# Patient Record
Sex: FEMALE | Race: WHITE | NOT HISPANIC OR LATINO | Employment: UNEMPLOYED | URBAN - NONMETROPOLITAN AREA
[De-identification: names, ages, dates, MRNs, and addresses within clinical notes are randomized per-mention and may not be internally consistent; named-entity substitution may affect disease eponyms.]

---

## 2017-01-21 ENCOUNTER — HOSPITAL ENCOUNTER (EMERGENCY)
Facility: HOSPITAL | Age: 62
Discharge: HOME | End: 2017-01-21
Attending: PHYSICIAN ASSISTANT | Admitting: PHYSICIAN ASSISTANT
Payer: MEDICARE

## 2017-01-21 VITALS
SYSTOLIC BLOOD PRESSURE: 178 MMHG | DIASTOLIC BLOOD PRESSURE: 86 MMHG | HEART RATE: 73 BPM | OXYGEN SATURATION: 98 % | TEMPERATURE: 98 F | RESPIRATION RATE: 16 BRPM

## 2017-01-21 DIAGNOSIS — S09.90XA CLOSED HEAD INJURY, INITIAL ENCOUNTER: ICD-10-CM

## 2017-01-21 DIAGNOSIS — S42.221A CLOSED 2-PART DISPLACED FRACTURE OF SURGICAL NECK OF RIGHT HUMERUS, INITIAL ENCOUNTER: Primary | ICD-10-CM
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
## 2017-01-21 LAB
ALBUMIN SERPL-MCNC: 3.2 G/DL (ref 3.4–5)
ALP SERPL-CCNC: 94 U/L (ref 40–150)
ALT SERPL W P-5'-P-CCNC: 18 U/L (ref 0–50)
ANION GAP SERPL CALCULATED.3IONS-SCNC: 10 MMOL/L (ref 3–14)
AST SERPL W P-5'-P-CCNC: 35 U/L (ref 0–45)
BASOPHILS # BLD AUTO: 0.1 10E9/L (ref 0–0.2)
BASOPHILS NFR BLD AUTO: 0.6 %
BILIRUB SERPL-MCNC: 0.3 MG/DL (ref 0.2–1.3)
BUN SERPL-MCNC: 15 MG/DL (ref 7–30)
CALCIUM SERPL-MCNC: 8.9 MG/DL (ref 8.5–10.1)
CHLORIDE SERPL-SCNC: 106 MMOL/L (ref 94–109)
CO2 SERPL-SCNC: 27 MMOL/L (ref 20–32)
CREAT SERPL-MCNC: 0.69 MG/DL (ref 0.52–1.04)
DIFFERENTIAL METHOD BLD: ABNORMAL
EOSINOPHIL # BLD AUTO: 0.3 10E9/L (ref 0–0.7)
EOSINOPHIL NFR BLD AUTO: 4.1 %
ERYTHROCYTE [DISTWIDTH] IN BLOOD BY AUTOMATED COUNT: 13 % (ref 10–15)
GFR SERPL CREATININE-BSD FRML MDRD: 85 ML/MIN/1.7M2
GLUCOSE SERPL-MCNC: 120 MG/DL (ref 70–99)
HCT VFR BLD AUTO: 37 % (ref 35–47)
HGB BLD-MCNC: 11.4 G/DL (ref 11.7–15.7)
IMM GRANULOCYTES # BLD: 0 10E9/L (ref 0–0.4)
IMM GRANULOCYTES NFR BLD: 0.4 %
LYMPHOCYTES # BLD AUTO: 2.5 10E9/L (ref 0.8–5.3)
LYMPHOCYTES NFR BLD AUTO: 29.2 %
MCH RBC QN AUTO: 27.7 PG (ref 26.5–33)
MCHC RBC AUTO-ENTMCNC: 30.8 G/DL (ref 31.5–36.5)
MCV RBC AUTO: 90 FL (ref 78–100)
MONOCYTES # BLD AUTO: 0.7 10E9/L (ref 0–1.3)
MONOCYTES NFR BLD AUTO: 8.6 %
NEUTROPHILS # BLD AUTO: 4.8 10E9/L (ref 1.6–8.3)
NEUTROPHILS NFR BLD AUTO: 57.1 %
NRBC # BLD AUTO: 0 10*3/UL
NRBC BLD AUTO-RTO: 0 /100
PLATELET # BLD AUTO: 179 10E9/L (ref 150–450)
POTASSIUM SERPL-SCNC: 4.3 MMOL/L (ref 3.4–5.3)
PROT SERPL-MCNC: 6.6 G/DL (ref 6.8–8.8)
RBC # BLD AUTO: 4.12 10E12/L (ref 3.8–5.2)
SODIUM SERPL-SCNC: 143 MMOL/L (ref 133–144)
WBC # BLD AUTO: 8.4 10E9/L (ref 4–11)

## 2017-01-21 PROCEDURE — 99284 EMERGENCY DEPT VISIT MOD MDM: CPT | Mod: 25

## 2017-01-21 PROCEDURE — 99283 EMERGENCY DEPT VISIT LOW MDM: CPT | Performed by: PHYSICIAN ASSISTANT

## 2017-01-21 PROCEDURE — 73070 X-RAY EXAM OF ELBOW: CPT | Mod: TC,RT

## 2017-01-21 PROCEDURE — 80053 COMPREHEN METABOLIC PANEL: CPT | Performed by: PHYSICIAN ASSISTANT

## 2017-01-21 PROCEDURE — 70450 CT HEAD/BRAIN W/O DYE: CPT | Mod: TC

## 2017-01-21 PROCEDURE — 36416 COLLJ CAPILLARY BLOOD SPEC: CPT | Performed by: PHYSICIAN ASSISTANT

## 2017-01-21 PROCEDURE — 72125 CT NECK SPINE W/O DYE: CPT | Mod: TC

## 2017-01-21 PROCEDURE — 73030 X-RAY EXAM OF SHOULDER: CPT | Mod: TC,RT

## 2017-01-21 PROCEDURE — 25000132 ZZH RX MED GY IP 250 OP 250 PS 637: Mod: GY | Performed by: PHYSICIAN ASSISTANT

## 2017-01-21 PROCEDURE — 73502 X-RAY EXAM HIP UNI 2-3 VIEWS: CPT | Mod: TC,RT

## 2017-01-21 PROCEDURE — A9270 NON-COVERED ITEM OR SERVICE: HCPCS | Mod: GY | Performed by: PHYSICIAN ASSISTANT

## 2017-01-21 PROCEDURE — 85025 COMPLETE CBC W/AUTO DIFF WBC: CPT | Performed by: PHYSICIAN ASSISTANT

## 2017-01-21 RX ORDER — HYDROCODONE BITARTRATE AND ACETAMINOPHEN 5; 325 MG/1; MG/1
1 TABLET ORAL ONCE
Status: COMPLETED | OUTPATIENT
Start: 2017-01-21 | End: 2017-01-21

## 2017-01-21 RX ADMIN — HYDROCODONE BITARTRATE AND ACETAMINOPHEN 1 TABLET: 5; 325 TABLET ORAL at 15:32

## 2017-01-21 NOTE — DISCHARGE INSTRUCTIONS
Wear your sling at all times. Ice your shoulder every four hours. Take your Norco as already prescribed. Follow up with Dr. Kendrick this week for follow up. Return here with new/worsening symptoms.     Shoulder Fracture  You have a break (fracture) of the shoulder. This may be a small crack in the bone. Or it may be a major break with the broken parts pushed out of position.    If you have only a crack in the bone and no bone fragments are out of place, you will probably be treated with a shoulder immobilizer. This is a special type of sling. Casts are not used for this type of fracture. Your bone should heal in 4 to 6 weeks. More serious injuries may need surgery to put the bones back into the correct position for healing.  Home care  Follow these tips to care for yourself at home:    Leave the shoulder immobilizer in place. This will support the injured arm at your side. This is the best position for the bone to heal.    The shoulder immobilizer is adjustable. If it becomes loose, adjust it so that your forearm is level with the ground (horizontal). Your hand should be level with your elbow.    Apply an ice pack to the injured area for 20 minutes every 1 to 2 hours the first day. You can make an ice pack by putting ice cubes in a plastic bag. Wrap the bag in a towel before putting it on your shoulder. Continue with ice packs 3 to 4 times a day for the next 2 days. Then use the ice as needed to relieve pain and swelling.    You may take acetaminophen or ibuprofen to relieve pain, unless another pain medicine was prescribed. If you have chronic liver or kidney disease or ever had a stomach ulcer or GI bleeding, talk with your doctor before using these medicines.    Don t take the sling off before your next exam unless you were told to do so.  Follow-up care  Follow up with your doctor in 1 week to be sure the bone is healing properly. A shoulder joint will become stiff if left in a sling for too long. Ask your  doctor when it is safe to begin range-of-motion exercises.  When to seek medical care  Get prompt medical care if any of these occur:    Your fingers become swollen, cold, blue, numb, or tingly    Your shoulder or upper arm swells a lot or looks very bruised    The pain in your shoulder gets worse    The splint breaks    You have a fever    8670-5103 [employer]. [address] [place] [address], [place] [id number]. All rights reserved. This information is not intended as a substitute for professional medical care. Always follow your healthcare professional's instructions.           * HEAD INJURY, no wake-up (Adult)    You have had a head injury. It does not appear serious at this time. Sometimes symptoms of a more serious problem (concussion, bruising or bleeding in the brain) may appear later. Therefore, watch for the warning signs listed below.  HOME CARE:    During the next 24 hours someone must stay with you to check for the signs below. It is not necessary to stay awake or be awakened during the night.    If you have swelling of the face or scalp, apply an ice pack (ice cubes in a plastic bag, wrapped in a towel) for 20 minutes. Do this every 1-2 hours until the swelling starts to go down.    You may use acetaminophen (Tylenol) 650-1000 mg every 6 hours or ibuprofen (Motrin, Advil) 600 mg every 6-8 hours with food to control pain, if you are able to take these medicines. [NOTE: If you have chronic liver or kidney disease or ever had a stomach ulcer or GI bleeding, talk with your doctor before using these medicines.] Do not take aspirin after a head injury.    For the next 24 hours:    Do not take alcohol, sedatives or medicines that make you sleepy.    Do not drive or operate machinery.    Avoid strenuous activities. No lifting or straining.    If you have had any symptoms of a concussion today (nausea, vomiting, dizziness, confusion, headache, memory loss or if you were knocked out), do not return to  sports or any activity that could result in another head injury until 2 full weeks after all symptoms are gone and you have been cleared by your doctor. A second head injury before fully recovering from the first one can lead to serious brain injury.  FOLLOW UP with your doctor if symptoms are not improving after 24 hours, or as directed.  GET PROMPT MEDICAL ATTENTION if any of the following warning signs occur:    Repeated vomiting    Severe or worsening headache or dizziness    Unusual drowsiness, or unable to awaken as usual    Confusion or change in behavior or speech, memory loss, blurred vision    Convulsion (seizure)    Increasing scalp or face swelling    Redness, warmth or pus from the swollen area    Fluid drainage or bleeding from the nose or ears    5088-6644 [facility], [address] [place] [address], [place] [address]. All rights reserved. This information is not intended as a substitute for professional medical care. Always follow your healthcare professional's instructions.

## 2017-01-21 NOTE — ED NOTES
Pt presents by EMS after a fall in ice today. Pt c/o right arm/shoulder pain into right clavicle, right hip pain, and hitting head. Pt has chronic neck pain and the pain is unchanged or worsened from normal. Pt alert and oriented but confused as to why she had her left shoulder repaired 1 yr ago. Pt states that she cannot remember why she had surgery. CMS intact and lt able to stand and move self to ED bed from EMS cot. No bleeding noted but bruising to right arm and left hip-pt states she fell out of bed last week.

## 2017-01-21 NOTE — ED NOTES
Discharge instructions reviewed with patient and , and both verbalized understanding. Pt ambulated with a steady gait to the exit.

## 2017-01-21 NOTE — ED NOTES
called and notified that pt was in ED per pts request.  states that he will be here in 20 minutes

## 2017-01-21 NOTE — ED AVS SNAPSHOT
[facility]    [address] [address]    Phone:  [phone]                                       Mary Jean Lesch   MRN: [id number]    Department:  [facility]   Date of Visit:  1/21/2017           Patient Information     Date Of Birth          1955        Your diagnoses for this visit were:     Closed 2-part displaced fracture of surgical neck of right humerus, initial encounter     Fall due to ice or snow, initial encounter     Closed head injury, initial encounter        You were seen by Stephanie Cruz PA-C.      Follow-up Information     Follow up with Raul Kendrick MD In 4 days.    Specialty:  Orthopedics    Contact information:    [facility]  [address]  [facility] [id number] [phone]          Follow up with [facility].    Specialty:  EMERGENCY MEDICINE    Why:  If symptoms worsen    Contact information:    [address] [address] [phone]    Additional information:    From [place]: Take [address]. Turn left at [address]. Turn left at the first stoplight on [address]. At the first stop sign, take a right onto [address]. Take a left into the parking lot and continue through until you reach the North enterance of the building.       From [place]: Take [address]. Take the [address] ramp towards [place]. Turn left onto [address]. Take a slight right onto [address]. Turn left at the first stoplight on [address]. At the first stop sign, take a right onto [address]. Take a left into the parking lot and continue through until you reach the North enterance of the building.       From [place]: Take [address]. Take a right at [address]. At the first stop sign, take a right onto [address]. Take a left into the parking lot and continue through until you reach the North enterance of the building.          Discharge Instructions         Wear your sling at all times. Ice your shoulder every four hours. Take your Norco as already prescribed. Follow up with Dr. Kendrick this week for follow up. Return here with new/worsening symptoms.     Shoulder Fracture  You have a break (fracture) of the shoulder. This may be a small crack in the bone. Or it may be a major break with the broken parts pushed out of position.    If you have only a crack in the bone and no bone fragments are out of place, you will probably be treated with a shoulder immobilizer. This is a special type of sling. Casts are not used for this type of fracture. Your bone should heal in 4 to 6 weeks. More serious injuries may need surgery to put the bones back into the correct position for healing.  Home care  Follow these tips to care for yourself at home:    Leave the shoulder immobilizer in place. This will support the injured arm at your side. This is the best position for the bone to heal.    The shoulder immobilizer is adjustable. If it becomes loose, adjust it so that your forearm is level with the ground (horizontal). Your hand should be level with your elbow.    Apply an ice pack to the injured area for 20 minutes every 1 to 2 hours the first day. You can make an ice pack by putting ice cubes in a plastic bag. Wrap the bag in a towel before putting it on your shoulder. Continue with ice packs 3 to 4 times a day for the next 2 days. Then use the ice as needed to relieve pain and swelling.    You may take acetaminophen or ibuprofen to relieve pain, unless another pain medicine was prescribed. If you have chronic liver or kidney disease or ever had a stomach ulcer or GI bleeding, talk with your doctor before using these medicines.    Don t take the sling off before your next exam unless you were told to do so.  Follow-up care  Follow up with your doctor in 1 week to be sure the bone is healing properly. A shoulder joint will become stiff if left in a  sling for too long. Ask your doctor when it is safe to begin range-of-motion exercises.  When to seek medical care  Get prompt medical care if any of these occur:    Your fingers become swollen, cold, blue, numb, or tingly    Your shoulder or upper arm swells a lot or looks very bruised    The pain in your shoulder gets worse    The splint breaks    You have a fever    [id number] [employer]. [address] [place] [address], [place] [id number]. All rights reserved. This information is not intended as a substitute for professional medical care. Always follow your healthcare professional's instructions.           * HEAD INJURY, no wake-up (Adult)    You have had a head injury. It does not appear serious at this time. Sometimes symptoms of a more serious problem (concussion, bruising or bleeding in the brain) may appear later. Therefore, watch for the warning signs listed below.  HOME CARE:    During the next 24 hours someone must stay with you to check for the signs below. It is not necessary to stay awake or be awakened during the night.    If you have swelling of the face or scalp, apply an ice pack (ice cubes in a plastic bag, wrapped in a towel) for 20 minutes. Do this every 1-2 hours until the swelling starts to go down.    You may use acetaminophen (Tylenol) 650-1000 mg every 6 hours or ibuprofen (Motrin, Advil) 600 mg every 6-8 hours with food to control pain, if you are able to take these medicines. [NOTE: If you have chronic liver or kidney disease or ever had a stomach ulcer or GI bleeding, talk with your doctor before using these medicines.] Do not take aspirin after a head injury.    For the next 24 hours:    Do not take alcohol, sedatives or medicines that make you sleepy.    Do not drive or operate machinery.    Avoid strenuous activities. No lifting or straining.    If you have had any symptoms of a concussion today (nausea, vomiting, dizziness, confusion, headache, memory loss or if you were knocked  out), do not return to sports or any activity that could result in another head injury until 2 full weeks after all symptoms are gone and you have been cleared by your doctor. A second head injury before fully recovering from the first one can lead to serious brain injury.  FOLLOW UP with your doctor if symptoms are not improving after 24 hours, or as directed.  GET PROMPT MEDICAL ATTENTION if any of the following warning signs occur:    Repeated vomiting    Severe or worsening headache or dizziness    Unusual drowsiness, or unable to awaken as usual    Confusion or change in behavior or speech, memory loss, blurred vision    Convulsion (seizure)    Increasing scalp or face swelling    Redness, warmth or pus from the swollen area    Fluid drainage or bleeding from the nose or ears    7959-8484 [facility], [address] [place] [address], [place] [address]. All rights reserved. This information is not intended as a substitute for professional medical care. Always follow your healthcare professional's instructions.           Review of your medicines      Our records show that you are taking the medicines listed below. If these are incorrect, please call your family doctor or clinic.        Dose / Directions Last dose taken    ammonium lactate 12 % cream   Commonly known as:  AMLACTIN        Apply topically daily as needed for dry skin   Refills:  0        ascorbic acid 500 MG tablet   Commonly known as:  VITAMIN C        daily Take 1 tablet by oral route times daily   Refills:  0        ASPIRIN PO   Dose:  81 mg        Take 81 mg by mouth daily   Refills:  0        cholecalciferol 1000 UNITS capsule   Commonly known as:  vitamin  -D   Dose:  1 capsule        Take 1 capsule by mouth daily   Refills:  0        clindamycin 1 % lotion   Commonly known as:  CLEOCIN T        Apply topically 2 times daily   Refills:  0        clonazePAM 2 MG tablet   Commonly known as:  klonoPIN   Quantity:  90 tablet        .25mg PO dinner and  bedtimne   Refills:  0        EPIPEN 0.3 MG/0.3ML injection   Dose:  0.3 mg   Generic drug:  EPINEPHrine        Inject 0.3 mg into the muscle once as needed For anaphylaxis   Refills:  0        Halobetasol-Ammonium Lactate 0.05 & 12 % (CREAM) Kit        Externally apply topically 2 times daily   Refills:  0        HYDROcodone-acetaminophen 5-325 MG per tablet   Commonly known as:  NORCO   Dose:  1-2 tablet   Quantity:  25 tablet        Take 1-2 tablets by mouth every 4 hours as needed for other (Moderate to Severe Pain)   Refills:  0        lactulose 10 GM/15ML solution   Commonly known as:  CHRONULAC   Dose:  20 g        Take 20 g by mouth 3 times daily   Refills:  0        levothyroxine 200 MCG tablet   Commonly known as:  LEVOTHROID   Dose:  200 mcg   Quantity:  30 tablet        Take 1 tablet (200 mcg) by mouth daily   Refills:  0        lidocaine 2 %   Commonly known as:  Uro-Jet        once 4 times daily   Refills:  0        lidocaine 4 % Crea cream   Commonly known as:  LMX4        Apply topically every 8 hours as needed   Refills:  0        meclizine 25 MG tablet   Commonly known as:  ANTIVERT   Dose:  25 mg        Take 25 mg by mouth daily   Refills:  0        metFORMIN 500 MG 24 hr tablet   Commonly known as:  GLUCOPHAGE XR   Dose:  500 mg   Quantity:  30 tablet        Take 1 tablet (500 mg) by mouth daily (with dinner)   Refills:  0        montelukast 10 MG tablet   Commonly known as:  SINGULAIR   Dose:  10 mg   Quantity:  90 tablet        Take 1 tablet (10 mg) by mouth At Bedtime   Refills:  3        MULTIPLE VITAMINS PO        Take 1 tablet by oral route every day with food   Refills:  0        mupirocin 2 % nasal ointment   Commonly known as:  BACTROBAN   Dose:  1 g        Apply 1 g into each nare 2 times daily   Refills:  0        NEXIUM PO        Take by mouth 2 times daily   Refills:  0        niacin 500 MG CR tablet   Commonly known as:  NIASPAN   Dose:  1000 mg   Quantity:  30 tablet        Take 2  tablets (1,000 mg) by mouth At Bedtime   Refills:  0        nitroglycerin 0.4 MG sublingual tablet   Commonly known as:  NITROSTAT        Place 1 tablet (0.4MG) by sublingual route at the 1st sign of attack; may repeat every 5 min until relief; if pain persists after 3 tablets in 15 min, prompt medical attention is recommended   Refills:  0        nystatin cream   Commonly known as:  MYCOSTATIN   Quantity:  15 g        Apply topically 2 times daily To affected skin, until healed   Refills:  0        OMEPRAZOLE PO   Dose:  40 mg        Take 40 mg by mouth   Refills:  0        RANITIDINE HCL PO   Dose:  150 mg        Take 150 mg by mouth   Refills:  0        RITALIN PO   Dose:  15 mg        Take 15 mg by mouth   Refills:  0        senna-docusate 8.6-50 MG per tablet   Commonly known as:  SENOKOT-S;PERICOLACE   Dose:  2 tablet   Quantity:  120 tablet        Take 2 tablets by mouth 2 times daily   Refills:  1        simvastatin 80 MG tablet   Commonly known as:  ZOCOR        Take by mouth At Bedtime   Refills:  0        SYMBICORT IN        Inhale 2 puff by inhalation route daily   Refills:  0        TIZANIDINE HCL PO   Dose:  4 mg        Take 4 mg by mouth every 8 hours as needed for muscle spasms   Refills:  0        traZODone 100 MG tablet   Commonly known as:  DESYREL   Dose:  100 mg   Quantity:  90 tablet        Take 1 tablet (100 mg) by mouth At Bedtime Take 2 at bedtime   Refills:  0        triamcinolone 0.1 % cream   Commonly known as:  KENALOG        Apply topically 3 times daily   Refills:  0        TYLENOL PO   Dose:  1000 mg   Indication:  Pain        Take 1,000 mg by mouth every 6 hours as needed   Refills:  0        * venlafaxine 150 MG Tb24 24 hr tablet   Commonly known as:  EFFEXOR-ER   Dose:  150 mg        Take 150 mg by mouth 2 times daily   Refills:  0        * venlafaxine 37.5 MG 24 hr capsule   Commonly known as:  EFFEXOR-XR   Dose:  37.5 mg   Quantity:  30 capsule        Take 1 capsule (37.5 mg) by  "mouth daily (with lunch)   Refills:  0        * Notice:  This list has 2 medication(s) that are the same as other medications prescribed for you. Read the directions carefully, and ask your doctor or other care provider to review them with you.            Procedures and tests performed during your visit     CBC with platelets differential    CT Head w/o Contrast    Cervical spine CT w/o contrast    Comprehensive metabolic panel    Elbow XR, 2 views, right    Shoulder XR, G/E 3 views, right    Sling    XR Pelvis and Hip Right 2 Views      Orders Needing Specimen Collection     None      Pending Results     Date and Time Order Name Status Description    1/21/2017 1341 XR Pelvis and Hip Right 2 Views In process     1/21/2017 1341 Elbow XR, 2 views, right In process     1/21/2017 1341 Shoulder XR, G/E 3 views, right In process     1/21/2017 1341 Cervical spine CT w/o contrast In process     1/21/2017 1341 CT Head w/o Contrast In process             Pending Culture Results     No orders found from 1/20/2017 to 1/22/2017.            Thank you for choosing [facility]       Thank you for choosing [facility] for your care. Our goal is always to provide you with excellent care. Hearing back from our patients is one way we can continue to improve our services. Please take a few minutes to complete the written survey that you may receive in the mail after you visit with us. Thank you!        [employer] Information     [employer] lets you send messages to your doctor, view your test results, renew your prescriptions, schedule appointments and more. To sign up, go to [email or web address] . Click on \"Log in\" on the left side of the screen, which will take you to the Welcome page. Then click on \"Sign up Now\" on the right side of the page.     You will be asked to enter the access code listed below, as well as some personal information. Please follow the directions to create your username and password.     Your access code is: " [email or web address]  Expires: 2017  3:27 PM     Your access code will  in 90 days. If you need help or a new code, please call your [facility] or [phone].        Care EveryWhere ID     This is your Care EveryWhere ID. This could be used by other organizations to access your [facility] medical records  [id number]        After Visit Summary       This is your record. Keep this with you and show to your community pharmacist(s) and doctor(s) at your next visit.

## 2017-01-21 NOTE — ED AVS SNAPSHOT
[facility]    [address]    [place] [address]    Phone:  [phone]                                       Mary Jean Lesch   MRN: [id number]    Department:  [facility]   Date of Visit:  1/21/2017           After Visit Summary Signature Page     I have received my discharge instructions, and my questions have been answered. I have discussed any challenges I see with this plan with the nurse or doctor.    ..........................................................................................................................................  Patient/Patient Representative Signature      ..........................................................................................................................................  Patient Representative Print Name and Relationship to Patient    ..................................................               ................................................  Date                                            Time    ..........................................................................................................................................  Reviewed by Signature/Title    ...................................................              ..............................................  Date                                                            Time

## 2017-01-22 NOTE — ED PROVIDER NOTES
History     Chief Complaint   Patient presents with     Fall     right shoulder and hip pain. pt hit head. fell in parking lot at [employer]      HPI  Mary Jean Lesch is a 61 year old female who is brought in via ambulance from outside [employer] for right shoulder and hip pain following a ground level fall. She slipped on ice and landed on her right side, also hitting her head. Denies LOC. No nausea/vomiting, weakness, numbness, or vision changes. She was given Fentanyl 50mcg x 2 in route. States chronic neck pain.    I have reviewed the Medications, Allergies, Past Medical and Surgical History, and Social History in the Epic system.    Review of Systems   All other systems reviewed and are negative.     Past Medical History:   Past Medical History   Diagnosis Date     Colon polyps      Asthma      Diabetes mellitus (H)      type II     Thyroid disease      hypothyroidism     History of blood transfusion      no reaction       Past Surgical History   Procedure Laterality Date     Colon polyps       Hysterectomy total abdominal, bilateral salpingo-oophorectomy, combined       Arthroplasty knee bilateral       Colonoscopy       Hc hemorrhoidectomy w banding/ligation  10/7/2013     Procedure: HEMORRHOIDECTOMY BANDING;  Surgeon: Adela Marshall DO;  Location: [facility]     Left shoulder surgery       x2: fracture repair and revision     Blepharoplasty, brow lift bilateral, combined       Transposition ulnar nerve (elbow) Left 10/7/2015     Procedure: TRANSPOSITION ULNAR NERVE (ELBOW);  Surgeon: Raul Kendrick MD;  Location: [facility]       Social History     Social History     Marital Status:      Spouse Name: N/A     Number of Children: N/A     Years of Education: N/A     Occupational History     Not on file.     Social History Main Topics     Smoking status: Never Smoker      Smokeless tobacco: Never Used     Alcohol Use: Yes      Comment: wine spritzer     Drug Use: No     Sexual Activity: Not on file      Other Topics Concern     Not on file     Social History Narrative       Discharge Medication List as of 1/21/2017  3:27 PM      CONTINUE these medications which have NOT CHANGED    Details   ASPIRIN PO Take 81 mg by mouth daily, Historical      HYDROcodone-acetaminophen (NORCO) 5-325 MG per tablet Take 1-2 tablets by mouth every 4 hours as needed for other (Moderate to Severe Pain), Disp-25 tablet, R-0, Local Print      levothyroxine (LEVOTHROID) 200 MCG tablet Take 1 tablet (200 mcg) by mouth daily, Disp-30 tablet, R-0, Fax      Esomeprazole Magnesium (NEXIUM PO) Take by mouth 2 times daily , Historical      clonazePAM (KLONOPIN) 2 MG tablet .25mg PO dinner and bedtimne, Disp-90 tablet, Transitional      Acetaminophen (TYLENOL PO) Take 1,000 mg by mouth every 6 hours as needed , Historical      cholecalciferol (VITAMIN  -D) 1000 UNITS capsule Take 1 capsule by mouth daily, Historical      ammonium lactate (AMLACTIN) 12 % cream Apply topically daily as needed for dry skinHistorical      clindamycin (CLEOCIN T) 1 % lotion Apply topically 2 times dailyHistorical      lactulose (CHRONULAC) 10 GM/15ML solution Take 20 g by mouth 3 times daily, Historical      mupirocin (BACTROBAN) 2 % nasal ointment Apply 1 g into each nare 2 times dailyHistorical      OMEPRAZOLE PO Take 40 mg by mouth, Historical      RANITIDINE HCL PO Take 150 mg by mouth, Historical      Methylphenidate HCl (RITALIN PO) Take 15 mg by mouth, Historical      TIZANIDINE HCL PO Take 4 mg by mouth every 8 hours as needed for muscle spasms, Historical      nystatin (MYCOSTATIN) cream Apply topically 2 times daily To affected skin, until healedDisp-15 g, [id number]      metFORMIN (GLUCOPHAGE XR) 500 MG 24 hr tablet Take 1 tablet (500 mg) by mouth daily (with dinner), Disp-30 tablet, TransitionalRESUME ON Saturday MAY 31.      traZODone (DESYREL) 100 MG tablet Take 1 tablet (100 mg) by mouth At Bedtime Take 2 at bedtime, Disp-90 tablet,  Transitional1-2 tablets at QHS      venlafaxine (EFFEXOR-XR) 37.5 MG 24 hr capsule Take 1 capsule (37.5 mg) by mouth daily (with lunch), Disp-30 capsule, Transitional      montelukast (SINGULAIR) 10 MG tablet Take 1 tablet (10 mg) by mouth At Bedtime, Disp-90 tablet, R-3, Transitional      senna-docusate (SENOKOT-S;PERICOLACE) 8.6-50 MG per tablet Take 2 tablets by mouth 2 times daily, Disp-120 tablet, R-1, Transitional      niacin (NIASPAN) 500 MG CR tablet Take 2 tablets (1,000 mg) by mouth At Bedtime, Disp-30 tablet, Transitional      Halobetasol-Ammonium Lactate 0.05 & 12 % (CREAM) KIT Externally apply topically 2 times daily , Historical      triamcinolone (KENALOG) 0.1 % cream Apply topically 3 times dailyHistorical      lidocaine (LMX 4) 4 % CREA Apply topically every 8 hours as neededHistorical      lidocaine 2 % (URO-JET) once 4 times daily, Historical      simvastatin (ZOCOR) 80 MG tablet Take by mouth At Bedtime, Historical      EPINEPHrine (EPIPEN) 0.3 MG/0.3ML injection Inject 0.3 mg into the muscle once as needed For anaphylaxis, Historical      ascorbic acid (VITAMIN C) 500 MG tablet daily Take 1 tablet by oral route times daily, Historical      Budesonide-Formoterol Fumarate (SYMBICORT IN) Inhale 2 puff by inhalation route daily, Historical      nitroglycerin (NITROSTAT) 0.4 MG SL tablet Place 1 tablet (0.4MG) by sublingual route at the 1st sign of attack; may repeat every 5 min until relief; if pain persists after 3 tablets in 15 min, prompt medical attention is recommended, Historical      MULTIPLE VITAMINS PO Take 1 tablet by oral route every day with food, Historical      meclizine (ANTIVERT) 25 MG tablet Take 25 mg by mouth daily , Historical      venlafaxine (EFFEXOR-ER) 150 MG TB24 Take 150 mg by mouth 2 times daily , Historical             Allergies: Strawberry      Physical Exam   BP: 167/85 mmHg  Pulse: 71  Heart Rate: 88  Temp: 98.1  F (36.7  C)  Resp: 18  SpO2: 96 %  Physical Exam    Constitutional: She is oriented to person, place, and time. No distress.   HENT:   Head: Atraumatic.   Eyes: EOM are normal. Pupils are equal, round, and reactive to light.   Cardiovascular: Regular rhythm and normal heart sounds.    Pulmonary/Chest: Breath sounds normal. No respiratory distress. She exhibits no tenderness.   Abdominal: Soft. Bowel sounds are normal. There is no tenderness.   Musculoskeletal: Normal range of motion.        Right shoulder: She exhibits tenderness and deformity.        Right elbow: Normal.       Right wrist: Normal.        Right hip: She exhibits tenderness. She exhibits normal range of motion and normal strength.        Cervical back: She exhibits no tenderness.        Thoracic back: She exhibits no tenderness.        Lumbar back: She exhibits no tenderness.        Right forearm: Normal.        Arms:       Right hand: Normal. Normal sensation noted. Normal strength noted.   Neurovascularly intact distally.   Neurological: She is alert and oriented to person, place, and time.   Skin: No abrasion and no laceration noted. She is not diaphoretic.   Nursing note and vitals reviewed.      ED Course   Procedures             Labs Ordered and Resulted from Time of ED Arrival Up to the Time of Departure from the ED   CBC WITH PLATELETS DIFFERENTIAL - Abnormal; Notable for the following:     Hemoglobin 11.4 (*)     MCHC 30.8 (*)     All other components within normal limits   COMPREHENSIVE METABOLIC PANEL - Abnormal; Notable for the following:     Glucose 120 (*)     Albumin 3.2 (*)     Protein Total 6.6 (*)     All other components within normal limits   CAST CARE     Results for orders placed or performed during the hospital encounter of 01/21/17   CT Head w/o Contrast    Narrative    CT SCAN OF BRAIN WITHOUT CONTRAST    REPORT:  The ventricular system is normal in size.  There are no  masses, ventricular shifts, or extracerebral collections.  The  brainstem and cerebellum appear normal.   No skull fractures are seen.  There is hyperostosis frontalis interna.  Paranasal sinuses are  clear.    IMPRESSION:  NEGATIVE CT SCAN OF THE BRAIN.  Exam Date: Jan 21, 2017 02:40:00 PM  Author: NANCY MARTINEZ  This report is final and signed     Cervical spine CT w/o contrast    Narrative    CT SCAN OF CERVICAL SPINE    REPORT:  There are degenerative changes at the middle atlantoaxial  joint.  There is decrease in height at the C6-C7 disk with anterior  osteophyte formation.  There are no fractures of the vertebral bodies  and arches.  The prevertebral soft tissues appear normal. Cervical  facet joint degenerative changes are seen at C2-C3, C3-C4 and C4-C5 on  the left.    IMPRESSION:  DEGENERATIVE CHANGES OF THE CERVICAL SPINE.  NO FRACTURES  ARE SEEN.  Exam Date: Jan 21, 2017 02:45:00 PM  Author: NANCY MARTINEZ  This report is final and signed           Assessments & Plan (with Medical Decision Making)   Pt has a closed, 2-part, mildly displaced fracture of the neck of the right humerus. No dislocation. Neurovascularly intact distally. Right hip and pelvis XR's are negative for acute fracture. Head and c-spine CT's also negative for acute findings. Her pain was well controlled once she was placed in a splint. She will f/u with Dr. Kendrick (prefers him) his week for follow up but this should not be surgical. She has Norco at home for pain and does not need refill. She was discharged home in good condition with her .     Plan: Wear your sling at all times. Ice your shoulder every four hours. Take your Norco as already prescribed. Follow up with Dr. Kendrick this week for follow up. Return here with new/worsening symptoms.     I have reviewed the nursing notes.    I have reviewed the findings, diagnosis, plan and need for follow up with the patient.    Discharge Medication List as of 1/21/2017  3:27 PM          Final diagnoses:   Closed 2-part displaced fracture of surgical neck of right humerus, initial  encounter   Fall due to ice or snow, initial encounter   Closed head injury, initial encounter       1/21/2017   [facility]      Stephanie Cruz PA-C  01/21/17 [id number]

## 2017-01-24 NOTE — PROGRESS NOTES
Quick Note:    Right Elbow XR report faxed to PCP, MIKA Flanagan NP. Impression - No obvious fracture, if there is persistent pain, consider follow up. Pt will follow up with Dr. Kendrick this week.  ______

## 2017-01-24 NOTE — PROGRESS NOTES
Quick Note:    Right Shoulder XR report faxed to PCPMIKA NP. Impression - Minimally displaced proximal humerus / humeral neck fracture. Pt will follow up with Dr. Kendrick this week.  ______

## 2017-01-24 NOTE — PROGRESS NOTES
Quick Note:    Pelvis XR report faxed to PCPMIKA NP at [facility]. Impression - Degenerative disease.  ______

## 2017-09-18 ENCOUNTER — DOCUMENTATION ONLY (OUTPATIENT)
Dept: OTHER | Facility: CLINIC | Age: 62
End: 2017-09-18

## 2017-09-18 PROBLEM — Z71.89 ACP (ADVANCE CARE PLANNING): Chronic | Status: ACTIVE | Noted: 2017-09-18

## 2017-12-28 ENCOUNTER — HOSPITAL ENCOUNTER (OUTPATIENT)
Dept: MRI IMAGING | Facility: HOSPITAL | Age: 62
Discharge: HOME | End: 2017-12-28
Attending: ORTHOPAEDIC SURGERY | Admitting: ORTHOPAEDIC SURGERY
Payer: MEDICARE

## 2017-12-28 DIAGNOSIS — M25.511 RIGHT SHOULDER PAIN: ICD-10-CM

## 2017-12-28 PROCEDURE — 73221 MRI JOINT UPR EXTREM W/O DYE: CPT | Mod: TC,RT

## 2018-01-09 ENCOUNTER — OFFICE VISIT - GICH (OUTPATIENT)
Dept: OTOLARYNGOLOGY | Facility: OTHER | Age: 63
End: 2018-01-09

## 2018-01-09 DIAGNOSIS — Z00.00 ENCOUNTER FOR GENERAL ADULT MEDICAL EXAMINATION WITHOUT ABNORMAL FINDINGS: ICD-10-CM

## 2018-01-30 ENCOUNTER — OFFICE VISIT - GICH (OUTPATIENT)
Dept: OTOLARYNGOLOGY | Facility: OTHER | Age: 63
End: 2018-01-30

## 2018-01-30 DIAGNOSIS — Z00.00 ENCOUNTER FOR GENERAL ADULT MEDICAL EXAMINATION WITHOUT ABNORMAL FINDINGS: ICD-10-CM

## 2018-02-12 ENCOUNTER — TRANSFERRED RECORDS (OUTPATIENT)
Dept: HEALTH INFORMATION MANAGEMENT | Facility: HOSPITAL | Age: 63
End: 2018-02-12

## 2018-02-13 NOTE — PROGRESS NOTES
Patient Information     Patient Name MRN Sex DOB Lesch, Maryjean [id number] Female 1955      Progress Notes by Treasure Madrigal at 2018 12:10 PM     Author:  Treasure Madrigal Service:  (none) Author Type:  (none)     Filed:  2018  9:43 AM Encounter Date:  2018 Status:  Signed     :  Treasure Madrigal            See scanned document.

## 2018-02-13 NOTE — PROGRESS NOTES
Patient Information     Patient Name MRN Sex DOB Lesch, Maryjean [id number] Female 1955      Progress Notes by Treasure Madrigal at 2018  1:20 PM     Author:  Treasure Madrigal Service:  (none) Author Type:  (none)     Filed:  2018  1:51 PM Encounter Date:  2018 Status:  Signed     :  Treasure Madrigal            See scanned document.

## 2018-02-26 ENCOUNTER — DOCUMENTATION ONLY (OUTPATIENT)
Dept: FAMILY MEDICINE | Facility: OTHER | Age: 63
End: 2018-02-26

## 2018-02-26 RX ORDER — OMEPRAZOLE 40 MG/1
40 CAPSULE, DELAYED RELEASE ORAL DAILY
COMMUNITY
Start: 2016-05-19 | End: 2021-04-14

## 2018-02-26 RX ORDER — SOLIFENACIN SUCCINATE 10 MG/1
10 TABLET, FILM COATED ORAL DAILY
COMMUNITY
Start: 2016-05-25 | End: 2022-04-25

## 2018-02-26 RX ORDER — LACTULOSE 10 G/15ML
15-30 SOLUTION ORAL DAILY PRN
COMMUNITY
Start: 2016-04-13 | End: 2019-02-26

## 2018-02-26 RX ORDER — VENLAFAXINE HYDROCHLORIDE 150 MG/1
CAPSULE, EXTENDED RELEASE ORAL
COMMUNITY
Start: 2016-06-01 | End: 2019-01-16

## 2018-02-26 RX ORDER — SIMVASTATIN 80 MG
TABLET ORAL
COMMUNITY
Start: 2016-06-06 | End: 2019-01-16

## 2018-02-26 RX ORDER — LEVOTHYROXINE SODIUM 88 UG/1
1 TABLET ORAL DAILY
COMMUNITY
Start: 2016-06-06 | End: 2023-09-26

## 2018-02-26 RX ORDER — VENLAFAXINE HYDROCHLORIDE 37.5 MG/1
CAPSULE, EXTENDED RELEASE ORAL
COMMUNITY
Start: 2016-05-06 | End: 2019-01-16

## 2018-02-26 RX ORDER — METHYLPHENIDATE HYDROCHLORIDE 20 MG/1
30 TABLET ORAL 2 TIMES DAILY
COMMUNITY
Start: 2016-06-01 | End: 2022-11-10

## 2018-02-26 RX ORDER — TRAZODONE HYDROCHLORIDE 100 MG/1
TABLET ORAL
COMMUNITY
Start: 2016-05-11 | End: 2019-01-16

## 2018-02-26 RX ORDER — METHYLPHENIDATE HYDROCHLORIDE 10 MG/1
TABLET ORAL
COMMUNITY
Start: 2016-05-10 | End: 2019-01-16

## 2018-02-26 RX ORDER — NIACIN 500 MG/1
TABLET, EXTENDED RELEASE ORAL
COMMUNITY
Start: 2016-05-19 | End: 2019-01-16

## 2018-02-26 RX ORDER — NEOMYCIN SULFATE, POLYMYXIN B SULFATE, HYDROCORTISONE 3.5; 10000; 1 MG/ML; [USP'U]/ML; MG/ML
1 SOLUTION/ DROPS AURICULAR (OTIC) 3 TIMES DAILY PRN
COMMUNITY
Start: 2016-03-25 | End: 2019-02-26

## 2018-02-26 RX ORDER — CLONAZEPAM 0.5 MG/1
0.25 TABLET ORAL 2 TIMES DAILY
COMMUNITY
Start: 2016-05-20

## 2018-02-26 RX ORDER — CLONAZEPAM 1 MG/1
1 TABLET ORAL 2 TIMES DAILY
COMMUNITY
Start: 2016-04-21 | End: 2019-01-16

## 2018-02-26 RX ORDER — MONTELUKAST SODIUM 10 MG/1
10 TABLET ORAL AT BEDTIME
COMMUNITY
Start: 2016-05-06 | End: 2019-02-26

## 2018-02-26 RX ORDER — MECLIZINE HYDROCHLORIDE 25 MG/1
1 TABLET ORAL 3 TIMES DAILY
COMMUNITY
Start: 2016-05-11

## 2018-02-26 RX ORDER — METFORMIN HCL 500 MG
1000 TABLET, EXTENDED RELEASE 24 HR ORAL 2 TIMES DAILY WITH MEALS
COMMUNITY
Start: 2016-05-19 | End: 2023-09-26

## 2018-09-04 ENCOUNTER — OFFICE VISIT (OUTPATIENT)
Dept: OTOLARYNGOLOGY | Facility: OTHER | Age: 63
End: 2018-09-04
Attending: OTOLARYNGOLOGY
Payer: MEDICARE

## 2018-09-04 DIAGNOSIS — J31.0 CHRONIC RHINITIS, UNSPECIFIED TYPE: ICD-10-CM

## 2018-09-04 DIAGNOSIS — H60.392 OTHER INFECTIVE OTITIS EXTERNA, LEFT EAR: Primary | ICD-10-CM

## 2018-09-04 PROCEDURE — G0463 HOSPITAL OUTPT CLINIC VISIT: HCPCS

## 2018-09-04 NOTE — MR AVS SNAPSHOT
"              After Visit Summary   2018    Mary Jean Lesch    MRN: [id number]           Patient Information     Date Of Birth          1955        Visit Information        Provider Department      2018 9:30 AM Gibran Lomas MD [facility]        Today's Diagnoses     Other infective otitis externa, left ear    -  1    Chronic rhinitis, unspecified type           Follow-ups after your visit        Who to contact     If you have questions or need follow up information about today's clinic visit or your schedule please contact [facility] directly at [phone].  Normal or non-critical lab and imaging results will be communicated to you by Chat& (ChatAnd)hart, letter or phone within 4 business days after the clinic has received the results. If you do not hear from us within 7 days, please contact the clinic through Chat& (ChatAnd)hart or phone. If you have a critical or abnormal lab result, we will notify you by phone as soon as possible.  Submit refill requests through [employer] or call your pharmacy and they will forward the refill request to us. Please allow 3 business days for your refill to be completed.          Additional Information About Your Visit        MyChart Information     [employer] lets you send messages to your doctor, view your test results, renew your prescriptions, schedule appointments and more. To sign up, go to [email or web address] . Click on \"Log in\" on the left side of the screen, which will take you to the Welcome page. Then click on \"Sign up Now\" on the right side of the page.     You will be asked to enter the access code listed below, as well as some personal information. Please follow the directions to create your username and password.     Your access code is: [email or web address]  Expires: 2018 11:01 AM     Your access code will  in 90 days. If you need help or a new code, please call your [place] clinic or [phone].        Care EveryWhere ID "     This is your Care EveryWhere ID. This could be used by other organizations to access your [place] medical records  [id number]         Blood Pressure from Last 3 Encounters:   01/21/17 178/86   06/13/16 124/70   02/22/16 141/70    Weight from Last 3 Encounters:   06/13/16 102.1 kg (225 lb)   02/22/16 103.9 kg (229 lb)   10/07/15 105.2 kg (232 lb)              Today, you had the following     No orders found for display         Today's Medication Changes          These changes are accurate as of 9/4/18 11:59 PM.  If you have any questions, ask your nurse or doctor.               These medicines have changed or have updated prescriptions.        Dose/Directions    * clonazePAM 2 MG tablet   Commonly known as:  klonoPIN   This may have changed:    - how much to take  - additional instructions   Used for:  Insomnia        .25mg PO dinner and bedtimne   Quantity:  90 tablet   Refills:  0       * clonazePAM 1 MG tablet   Commonly known as:  klonoPIN   This may have changed:  Another medication with the same name was changed. Make sure you understand how and when to take each.        Dose:  1 tablet   Take 1 tablet by mouth 2 times daily   Refills:  0       * clonazePAM 0.5 MG tablet   Commonly known as:  klonoPIN   This may have changed:  Another medication with the same name was changed. Make sure you understand how and when to take each.        Dose:  0.5 tablet   Take 0.5 tablets by mouth 2 times daily   Refills:  0       * traZODone 100 MG tablet   Commonly known as:  DESYREL   This may have changed:  additional instructions   Used for:  Insomnia        Dose:  100 mg   Take 1 tablet (100 mg) by mouth At Bedtime Take 2 at bedtime   Quantity:  90 tablet   Refills:  0       * traZODone 100 MG tablet   Commonly known as:  DESYREL   This may have changed:  Another medication with the same name was changed. Make sure you understand how and when to take each.        Refills:  0       * venlafaxine 150 MG Tb24 24 hr  tablet   Commonly known as:  EFFEXOR-ER   This may have changed:  Another medication with the same name was changed. Make sure you understand how and when to take each.        Dose:  150 mg   Take 150 mg by mouth 2 times daily   Refills:  0       * venlafaxine 37.5 MG 24 hr capsule   Commonly known as:  EFFEXOR-XR   This may have changed:  when to take this   Used for:  Depression        Dose:  37.5 mg   Take 1 capsule (37.5 mg) by mouth daily (with lunch)   Quantity:  30 capsule   Refills:  0       * venlafaxine 37.5 MG 24 hr capsule   Commonly known as:  EFFEXOR-XR   This may have changed:  Another medication with the same name was changed. Make sure you understand how and when to take each.        Refills:  0       * venlafaxine 150 MG 24 hr capsule   Commonly known as:  EFFEXOR-XR   This may have changed:  Another medication with the same name was changed. Make sure you understand how and when to take each.        Refills:  0       * Notice:  This list has 9 medication(s) that are the same as other medications prescribed for you. Read the directions carefully, and ask your doctor or other care provider to review them with you.             Primary Care Provider Office Phone # Fax #    Nena Flanagan [phone]       [facility] [address] [facility] [address]        Equal Access to Services     KATIA DHALIWAL AH: Hadii maryjo fragosoo Sojohana, waaxda luqadaha, qaybta kaalmada adeegyada, liane montoya. So [facility] [phone].    ATENCIÓN: Si habla español, tiene a allen disposición servicios gratuitos de asistencia lingüística. Llame al [phone].    We comply with applicable federal civil rights laws and [place] laws. We do not discriminate on the basis of race, color, national origin, age, disability, sex, sexual orientation, or gender identity.            Thank you!     Thank you for choosing [facility] AND [facility]  for your care. Our  goal is always to provide you with excellent care. Hearing back from our patients is one way we can continue to improve our services. Please take a few minutes to complete the written survey that you may receive in the mail after your visit with us. Thank you!             Your Updated Medication List - Protect others around you: Learn how to safely use, store and throw away your medicines at www.disposemymeds.org.          This list is accurate as of 9/4/18 11:59 PM.  Always use your most recent med list.                   Brand Name Dispense Instructions for use Diagnosis    ammonium lactate 12 % cream    AMLACTIN     Apply topically daily as needed for dry skin        ascorbic acid 500 MG tablet    VITAMIN C     daily Take 1 tablet by oral route times daily        ASPIRIN PO      Take 81 mg by mouth daily        cholecalciferol 1000 units capsule    vitamin  -D     Take 1 capsule by mouth daily        clindamycin 1 % lotion    CLEOCIN T     Apply topically 2 times daily        * clonazePAM 2 MG tablet    klonoPIN    90 tablet    .25mg PO dinner and bedtimne    Insomnia       * clonazePAM 1 MG tablet    klonoPIN     Take 1 tablet by mouth 2 times daily        * clonazePAM 0.5 MG tablet    klonoPIN     Take 0.5 tablets by mouth 2 times daily        EPIPEN 0.3 MG/0.3ML injection   Generic drug:  EPINEPHrine      Inject 0.3 mg into the muscle once as needed For anaphylaxis        FLECTOR 1.3 % Patch   Generic drug:  diclofenac      Place 1 patch onto the skin every 12 hours        Halobetasol-Ammonium Lactate 0.05 & 12 % (Cream) Kit      Externally apply topically 2 times daily        HYDROcodone-acetaminophen 5-325 MG per tablet    NORCO    25 tablet    Take 1-2 tablets by mouth every 4 hours as needed for other (Moderate to Severe Pain)    Ulnar nerve entrapment at elbow, left       * lactulose 10 GM/15ML solution    CHRONULAC     Take 20 g by mouth 3 times daily        * lactulose 10 GM/15ML solution    CHRONULAC      Take 15-30 mLs by mouth daily        * levothyroxine 200 MCG tablet    LEVOTHROID    30 tablet    Take 1 tablet (200 mcg) by mouth daily    Unspecified hypothyroidism       * levothyroxine 200 MCG tablet    SYNTHROID/LEVOTHROID     Take 1 tablet by mouth daily        lidocaine 2 %    URO-JET     once 4 times daily        lidocaine 4 % Crea cream    LMX4     Apply topically every 8 hours as needed        * meclizine 25 MG tablet    ANTIVERT     Take 25 mg by mouth daily        * meclizine 25 MG tablet    ANTIVERT     Take 1 tablet by mouth 2 times daily        * metFORMIN 500 MG 24 hr tablet    GLUCOPHAGE XR    30 tablet    Take 1 tablet (500 mg) by mouth daily (with dinner)    Type II or unspecified type diabetes mellitus with neurological manifestations, not stated as uncontrolled(250.60) (H)       * metFORMIN 500 MG 24 hr tablet    GLUCOPHAGE-XR     Take 1 tablet by mouth daily        * montelukast 10 MG tablet    SINGULAIR    90 tablet    Take 1 tablet (10 mg) by mouth At Bedtime    Seasonal allergies       * montelukast 10 MG tablet    SINGULAIR          MULTIPLE VITAMINS PO      Take 1 tablet by oral route every day with food        mupirocin 2 % nasal ointment    BACTROBAN     Apply 1 g into each nare 2 times daily        neomycin-polymyxin-HC 1 % Soln           NEXIUM PO      Take by mouth 2 times daily        * niacin 500 MG CR tablet    NIASPAN    30 tablet    Take 2 tablets (1,000 mg) by mouth At Bedtime    Hyperlipidemia LDL goal < 100       * niacin 500 MG CR tablet    NIASPAN          nitroGLYcerin 0.4 MG sublingual tablet    NITROSTAT     Place 1 tablet (0.4MG) by sublingual route at the 1st sign of attack; may repeat every 5 min until relief; if pain persists after 3 tablets in 15 min, prompt medical attention is recommended        nystatin cream    MYCOSTATIN    15 g    Apply topically 2 times daily To affected skin, until healed        * OMEPRAZOLE PO      Take 40 mg by mouth        * omeprazole  40 MG capsule    priLOSEC          RANITIDINE HCL PO      Take 150 mg by mouth        * RITALIN PO      Take 15 mg by mouth        * methylphenidate 10 MG tablet    RITALIN          * methylphenidate 20 MG tablet    RITALIN          senna-docusate 8.6-50 MG per tablet    SENOKOT-S;PERICOLACE    120 tablet    Take 2 tablets by mouth 2 times daily    Constipation       * simvastatin 80 MG tablet    ZOCOR     Take by mouth At Bedtime        * simvastatin 80 MG tablet    ZOCOR          SYMBICORT IN      Inhale 2 puff by inhalation route daily        TIZANIDINE HCL PO      Take 4 mg by mouth every 8 hours as needed for muscle spasms        * traZODone 100 MG tablet    DESYREL    90 tablet    Take 1 tablet (100 mg) by mouth At Bedtime Take 2 at bedtime    Insomnia       * traZODone 100 MG tablet    DESYREL          triamcinolone 0.1 % cream    KENALOG     Apply topically 3 times daily        TYLENOL PO      Take 1,000 mg by mouth every 6 hours as needed        * venlafaxine 150 MG Tb24 24 hr tablet    EFFEXOR-ER     Take 150 mg by mouth 2 times daily        * venlafaxine 37.5 MG 24 hr capsule    EFFEXOR-XR    30 capsule    Take 1 capsule (37.5 mg) by mouth daily (with lunch)    Depression       * venlafaxine 37.5 MG 24 hr capsule    EFFEXOR-XR          * venlafaxine 150 MG 24 hr capsule    EFFEXOR-XR          VESICARE 10 MG tablet   Generic drug:  solifenacin           * Notice:  This list has 28 medication(s) that are the same as other medications prescribed for you. Read the directions carefully, and ask your doctor or other care provider to review them with you.
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
## 2018-10-02 ENCOUNTER — OFFICE VISIT (OUTPATIENT)
Dept: OTOLARYNGOLOGY | Facility: OTHER | Age: 63
End: 2018-10-02
Attending: OTOLARYNGOLOGY
Payer: MEDICARE

## 2018-10-02 DIAGNOSIS — H69.93 DYSFUNCTION OF BOTH EUSTACHIAN TUBES: Primary | ICD-10-CM

## 2018-10-02 PROCEDURE — G0463 HOSPITAL OUTPT CLINIC VISIT: HCPCS

## 2018-10-02 NOTE — MR AVS SNAPSHOT
"              After Visit Summary   10/2/2018    Mary Jean Lesch    MRN: [id number]           Patient Information     Date Of Birth          1955        Visit Information        Provider Department      10/2/2018 2:45 PM Gibran Lomas MD [facility]        Today's Diagnoses     Dysfunction of both eustachian tubes    -  1       Follow-ups after your visit        Who to contact     If you have questions or need follow up information about today's clinic visit or your schedule please contact [facility] directly at [phone].  Normal or non-critical lab and imaging results will be communicated to you by [employer], letter or phone within 4 business days after the clinic has received the results. If you do not hear from us within 7 days, please contact the clinic through [employer] or phone. If you have a critical or abnormal lab result, we will notify you by phone as soon as possible.  Submit refill requests through [facility] or call your pharmacy and they will forward the refill request to us. Please allow 3 business days for your refill to be completed.          Additional Information About Your Visit        MyChart Information     [facility] lets you send messages to your doctor, view your test results, renew your prescriptions, schedule appointments and more. To sign up, go to [email or web address] . Click on \"Log in\" on the left side of the screen, which will take you to the Welcome page. Then click on \"Sign up Now\" on the right side of the page.     You will be asked to enter the access code listed below, as well as some personal information. Please follow the directions to create your username and password.     Your access code is: [email or web address]  Expires: 2018 11:01 AM     Your access code will  in 90 days. If you need help or a new code, please call your [place] clinic or [phone].        Care EveryWhere ID     This is your Care EveryWhere ID. This " could be used by other organizations to access your [facility] medical records  [id number]         Blood Pressure from Last 3 Encounters:   01/21/17 178/86   06/13/16 124/70   02/22/16 141/70    Weight from Last 3 Encounters:   06/13/16 102.1 kg (225 lb)   02/22/16 103.9 kg (229 lb)   10/07/15 105.2 kg (232 lb)              Today, you had the following     No orders found for display         Today's Medication Changes          These changes are accurate as of 10/2/18 11:59 PM.  If you have any questions, ask your nurse or doctor.               These medicines have changed or have updated prescriptions.        Dose/Directions    * clonazePAM 2 MG tablet   Commonly known as:  klonoPIN   This may have changed:    - how much to take  - additional instructions   Used for:  Insomnia        .25mg PO dinner and bedtimne   Quantity:  90 tablet   Refills:  0       * clonazePAM 1 MG tablet   Commonly known as:  klonoPIN   This may have changed:  Another medication with the same name was changed. Make sure you understand how and when to take each.        Dose:  1 tablet   Take 1 tablet by mouth 2 times daily   Refills:  0       * clonazePAM 0.5 MG tablet   Commonly known as:  klonoPIN   This may have changed:  Another medication with the same name was changed. Make sure you understand how and when to take each.        Dose:  0.5 tablet   Take 0.5 tablets by mouth 2 times daily   Refills:  0       * traZODone 100 MG tablet   Commonly known as:  DESYREL   This may have changed:  additional instructions   Used for:  Insomnia        Dose:  100 mg   Take 1 tablet (100 mg) by mouth At Bedtime Take 2 at bedtime   Quantity:  90 tablet   Refills:  0       * traZODone 100 MG tablet   Commonly known as:  DESYREL   This may have changed:  Another medication with the same name was changed. Make sure you understand how and when to take each.        Refills:  0       * venlafaxine 150 MG Tb24 24 hr tablet   Commonly known as:  EFFEXOR-ER    This may have changed:  Another medication with the same name was changed. Make sure you understand how and when to take each.        Dose:  150 mg   Take 150 mg by mouth 2 times daily   Refills:  0       * venlafaxine 37.5 MG 24 hr capsule   Commonly known as:  EFFEXOR-XR   This may have changed:  when to take this   Used for:  Depression        Dose:  37.5 mg   Take 1 capsule (37.5 mg) by mouth daily (with lunch)   Quantity:  30 capsule   Refills:  0       * venlafaxine 37.5 MG 24 hr capsule   Commonly known as:  EFFEXOR-XR   This may have changed:  Another medication with the same name was changed. Make sure you understand how and when to take each.        Refills:  0       * venlafaxine 150 MG 24 hr capsule   Commonly known as:  EFFEXOR-XR   This may have changed:  Another medication with the same name was changed. Make sure you understand how and when to take each.        Refills:  0       * Notice:  This list has 9 medication(s) that are the same as other medications prescribed for you. Read the directions carefully, and ask your doctor or other care provider to review them with you.             Primary Care Provider Office Phone # Fax #    Nena Flanagan [phone]       [facility] MEDICINE [address] [facility] [address]        Equal Access to Services     KATIA DHALIWAL AH: Joselo fragosoo Sochrisali, waaxda luqadaha, qaybta kaalmada adeegyada, liane montoya. So [facility] [phone].    ATENCIÓN: Si habla español, tiene a allen disposición servicios gratuitos de asistencia lingüística. Llame al [phone].    We comply with applicable federal civil rights laws and [place] laws. We do not discriminate on the basis of race, color, national origin, age, disability, sex, sexual orientation, or gender identity.            Thank you!     Thank you for choosing [facility] AND [facility]  for your care. Our goal is always to provide you with  excellent care. Hearing back from our patients is one way we can continue to improve our services. Please take a few minutes to complete the written survey that you may receive in the mail after your visit with us. Thank you!             Your Updated Medication List - Protect others around you: Learn how to safely use, store and throw away your medicines at www.disposemymeds.org.          This list is accurate as of 10/2/18 11:59 PM.  Always use your most recent med list.                   Brand Name Dispense Instructions for use Diagnosis    ammonium lactate 12 % cream    AMLACTIN     Apply topically daily as needed for dry skin        ascorbic acid 500 MG tablet    VITAMIN C     daily Take 1 tablet by oral route times daily        ASPIRIN PO      Take 81 mg by mouth daily        cholecalciferol 1000 units capsule    vitamin  -D     Take 1 capsule by mouth daily        clindamycin 1 % lotion    CLEOCIN T     Apply topically 2 times daily        * clonazePAM 2 MG tablet    klonoPIN    90 tablet    .25mg PO dinner and bedtimne    Insomnia       * clonazePAM 1 MG tablet    klonoPIN     Take 1 tablet by mouth 2 times daily        * clonazePAM 0.5 MG tablet    klonoPIN     Take 0.5 tablets by mouth 2 times daily        EPIPEN 0.3 MG/0.3ML injection   Generic drug:  EPINEPHrine      Inject 0.3 mg into the muscle once as needed For anaphylaxis        FLECTOR 1.3 % Patch   Generic drug:  diclofenac      Place 1 patch onto the skin every 12 hours        Halobetasol-Ammonium Lactate 0.05 & 12 % (Cream) Kit      Externally apply topically 2 times daily        HYDROcodone-acetaminophen 5-325 MG per tablet    NORCO    25 tablet    Take 1-2 tablets by mouth every 4 hours as needed for other (Moderate to Severe Pain)    Ulnar nerve entrapment at elbow, left       * lactulose 10 GM/15ML solution    CHRONULAC     Take 20 g by mouth 3 times daily        * lactulose 10 GM/15ML solution    CHRONULAC     Take 15-30 mLs by mouth daily         * levothyroxine 200 MCG tablet    LEVOTHROID    30 tablet    Take 1 tablet (200 mcg) by mouth daily    Unspecified hypothyroidism       * levothyroxine 200 MCG tablet    SYNTHROID/LEVOTHROID     Take 1 tablet by mouth daily        lidocaine 2 %    URO-JET     once 4 times daily        lidocaine 4 % Crea cream    LMX4     Apply topically every 8 hours as needed        * meclizine 25 MG tablet    ANTIVERT     Take 25 mg by mouth daily        * meclizine 25 MG tablet    ANTIVERT     Take 1 tablet by mouth 2 times daily        * metFORMIN 500 MG 24 hr tablet    GLUCOPHAGE XR    30 tablet    Take 1 tablet (500 mg) by mouth daily (with dinner)    Type II or unspecified type diabetes mellitus with neurological manifestations, not stated as uncontrolled(250.60) (H)       * metFORMIN 500 MG 24 hr tablet    GLUCOPHAGE-XR     Take 1 tablet by mouth daily        * montelukast 10 MG tablet    SINGULAIR    90 tablet    Take 1 tablet (10 mg) by mouth At Bedtime    Seasonal allergies       * montelukast 10 MG tablet    SINGULAIR          MULTIPLE VITAMINS PO      Take 1 tablet by oral route every day with food        mupirocin 2 % nasal ointment    BACTROBAN     Apply 1 g into each nare 2 times daily        neomycin-polymyxin-HC 1 % Soln           NEXIUM PO      Take by mouth 2 times daily        * niacin 500 MG CR tablet    NIASPAN    30 tablet    Take 2 tablets (1,000 mg) by mouth At Bedtime    Hyperlipidemia LDL goal < 100       * niacin 500 MG CR tablet    NIASPAN          nitroGLYcerin 0.4 MG sublingual tablet    NITROSTAT     Place 1 tablet (0.4MG) by sublingual route at the 1st sign of attack; may repeat every 5 min until relief; if pain persists after 3 tablets in 15 min, prompt medical attention is recommended        nystatin cream    MYCOSTATIN    15 g    Apply topically 2 times daily To affected skin, until healed        * OMEPRAZOLE PO      Take 40 mg by mouth        * omeprazole 40 MG capsule    priLOSEC           RANITIDINE HCL PO      Take 150 mg by mouth        * RITALIN PO      Take 15 mg by mouth        * methylphenidate 10 MG tablet    RITALIN          * methylphenidate 20 MG tablet    RITALIN          senna-docusate 8.6-50 MG per tablet    SENOKOT-S;PERICOLACE    120 tablet    Take 2 tablets by mouth 2 times daily    Constipation       * simvastatin 80 MG tablet    ZOCOR     Take by mouth At Bedtime        * simvastatin 80 MG tablet    ZOCOR          SYMBICORT IN      Inhale 2 puff by inhalation route daily        TIZANIDINE HCL PO      Take 4 mg by mouth every 8 hours as needed for muscle spasms        * traZODone 100 MG tablet    DESYREL    90 tablet    Take 1 tablet (100 mg) by mouth At Bedtime Take 2 at bedtime    Insomnia       * traZODone 100 MG tablet    DESYREL          triamcinolone 0.1 % cream    KENALOG     Apply topically 3 times daily        TYLENOL PO      Take 1,000 mg by mouth every 6 hours as needed        * venlafaxine 150 MG Tb24 24 hr tablet    EFFEXOR-ER     Take 150 mg by mouth 2 times daily        * venlafaxine 37.5 MG 24 hr capsule    EFFEXOR-XR    30 capsule    Take 1 capsule (37.5 mg) by mouth daily (with lunch)    Depression       * venlafaxine 37.5 MG 24 hr capsule    EFFEXOR-XR          * venlafaxine 150 MG 24 hr capsule    EFFEXOR-XR          VESICARE 10 MG tablet   Generic drug:  solifenacin           * Notice:  This list has 28 medication(s) that are the same as other medications prescribed for you. Read the directions carefully, and ask your doctor or other care provider to review them with you.

## 2018-10-16 NOTE — PROGRESS NOTES
LESCH, MARYJEAN    63 Y old Female, : 1955    Account Number: [id number]    [address] [facility] [id number]    Home: [phone]     Guarantor: LESCH, MARYJEAN Insurance: [facility] MEDICARE B Payer ID: [id number]   PCP: ADITYA DANIEL CNP    Appointment Facility: [facility]      10/02/2018 Progress Notes: Gibran Lomas MD        Reason for Appointment     1. RECHECK EARS AFTER MEDS     2. Stuffy ears     History of Present Illness     HPI:   The patient is a 63-year-old female with chronic nasal and ear complaints. She is presently on a prolonged nasal steroid trial. She is having some lingering pressure in her ears. She denies drainage.     Examination     General Examination:  External auditory canals are clear. The eardrums show some tympanosclerotic patches as well as some monomeric areas. The eardrums are retracted. There is no obvious middle ear fluid.   The remainder of the head neck exam is unchanged.       Assessments     1. Dysfunction of both Eustachian tubes - H69.83 (Primary)     Treatment     1. Others   Notes: Patient was counseled that her ears look healthy today. She will continue with her nasal steroid trial and add twice daily nasal saline in an effort to not only improve her sinus symptoms but her eustachian tube dysfunction as well.  Procedures  [ ].                Follow Up     3 Months                        Past Medical History Electronically signed by GIBRAN LOMAS MD on 10/03/2018 at 04:14 PM CDT    Traumatic brain injury, with loss of consciousness of unspecified duration, sequela.       ADHD, predominantly inattentive type.       Posttraumatic encephalopathy.       Organic mood disorder of depressed type.       Cognitive disorder.       Conversion disorder.       Psychogenic spell.       Bipolar affective disorder.       Personality disorder.       PTSD (post-traumatic stress disorder).       Moderate recurrent major depression.       Panic disorder without  agoraphobia.       Anxiety.       Small vessel disease.       TIA (transient ischemic attack).       History of seizures.       Essential tremor.       Migraine with aura and without status migrainosus, not intractable.       Nuclear sclerotic cataract of both eyes.       Epiretinal membrane, right eye.       Myopia of both eyes.       Hemianopia.       Long term use of drug.       Type 2 diabetes mellitus without complication.       Insulin resistance.       Essential hypertension.       Mixed hyperlipidemia.       Other specified hypothyroidism.       Iron deficiency anemia due to chronic blood loss.       GERD with esophagitis.       Slow transit constipation.       Elevated alkaline phosphatase level.       Overactive bladder.       Simple chronic bronchitis.       DIMITRY (obstructive sleep apnea).       Idiopathic hypersomnia with long sleep time.       Environmental allergies.       Chronic maxillary sinusitis.       Eustachian tube dysfunction, bilateral.       Tinnitus of both ears.       Benign paroxysmal positional vertigo due to bilateral vestibular disorder.       Mixed hearing loss, bilateral.       Non morbid obesity due to excess calories.       Chronic pain syndrome.       Fibromyalgia.       Primary osteoarthritis involving multiple joints.       Cervicalgia.       Cervical spondylosis without myelopathy.       Lumbosacral spondylosis without myelopathy.       Degeneration of lumbar intervertebral disc.       Low back pain with left-sided sciatica.       Low back pain with right-sided sciatica.       Status post total bilateral knee replacement.       Weakness.       Numbness in feet.       Osteopenia.       Late effect of lacunar infarction.       Chronic tension-type headache, not intractable.       Dizziness.       Near syncope.       Dry eye syndrome of bilateral lacrimal glands.       Other allergic rhinitis.       Hot flashes.       Epiretinal membrane (ERM) of both eyes.       Closed fracture of  proximal end of right humerus with routine healing, unspecified fracture morphology, subsequent encounter.       Concussion, without loss of consciousness, subsequent encounter.       Bereavement.       Diabetes mellitus without complication.       Dry eye syndrome.       Family History Sign off status: Completed    Father: , Dupuytren, Kidney failure     Mother: , Uterine cancer     Paternal Grand Father:      Paternal Grand Mother:      Maternal Grand Father: , MI     Maternal Grand Mother:      no history eye disease, Father's sides has hx of carotid artery disease.     Allergies     STRAWBERRY: anaphylaxis: Allergy     Aspirin: Bruises easily: Contraindication     Review of Systems     [facility]  [address] [place] [address]  Tel: [phone]  Fax:       [ ].           Patient: LESCH, MARYJEAN : 1955 Progress Note: Gibran Lomas MD 10/02/2018        Note generated by [employer] EMR/PM Software ([email or web address])

## 2019-01-16 ENCOUNTER — APPOINTMENT (OUTPATIENT)
Dept: MRI IMAGING | Facility: HOSPITAL | Age: 64
End: 2019-01-16
Payer: MEDICARE

## 2019-01-16 ENCOUNTER — APPOINTMENT (OUTPATIENT)
Dept: CT IMAGING | Facility: HOSPITAL | Age: 64
End: 2019-01-16
Payer: MEDICARE

## 2019-01-16 ENCOUNTER — HOSPITAL ENCOUNTER (OUTPATIENT)
Facility: HOSPITAL | Age: 64
Setting detail: OBSERVATION
Discharge: HOME | End: 2019-01-18
Attending: FAMILY MEDICINE | Admitting: HOSPITALIST
Payer: MEDICARE

## 2019-01-16 DIAGNOSIS — R25.8 BRADYKINESIA: ICD-10-CM

## 2019-01-16 PROBLEM — E78.5 HYPERLIPIDEMIA: Status: ACTIVE | Noted: 2019-01-16

## 2019-01-16 PROBLEM — F32.9 MAJOR DEPRESSIVE DISORDER WITH CURRENT ACTIVE EPISODE: Status: ACTIVE | Noted: 2019-01-16

## 2019-01-16 PROBLEM — I10 BENIGN ESSENTIAL HYPERTENSION: Status: ACTIVE | Noted: 2019-01-16

## 2019-01-16 PROBLEM — F41.9 ANXIETY: Status: ACTIVE | Noted: 2019-01-16

## 2019-01-16 PROBLEM — E11.9 DIABETES MELLITUS, TYPE 2 (H): Status: ACTIVE | Noted: 2019-01-16

## 2019-01-16 LAB
ALBUMIN SERPL-MCNC: 3.8 G/DL (ref 3.4–5)
ALBUMIN UR-MCNC: NEGATIVE MG/DL
ALP SERPL-CCNC: 111 U/L (ref 40–150)
ALT SERPL W P-5'-P-CCNC: 19 U/L (ref 0–50)
ANION GAP SERPL CALCULATED.3IONS-SCNC: 5 MMOL/L (ref 3–14)
APPEARANCE UR: CLEAR
AST SERPL W P-5'-P-CCNC: 26 U/L (ref 0–45)
BACTERIA #/AREA URNS HPF: ABNORMAL /HPF
BASOPHILS # BLD AUTO: 0 10E9/L (ref 0–0.2)
BASOPHILS NFR BLD AUTO: 0.5 %
BILIRUB SERPL-MCNC: 0.2 MG/DL (ref 0.2–1.3)
BILIRUB UR QL STRIP: NEGATIVE
BUN SERPL-MCNC: 18 MG/DL (ref 7–30)
CALCIUM SERPL-MCNC: 9.3 MG/DL (ref 8.5–10.1)
CHLORIDE SERPL-SCNC: 103 MMOL/L (ref 94–109)
CO2 SERPL-SCNC: 30 MMOL/L (ref 20–32)
COLOR UR AUTO: ABNORMAL
CREAT SERPL-MCNC: 0.68 MG/DL (ref 0.52–1.04)
DIFFERENTIAL METHOD BLD: NORMAL
EOSINOPHIL # BLD AUTO: 0.3 10E9/L (ref 0–0.7)
EOSINOPHIL NFR BLD AUTO: 2.9 %
ERYTHROCYTE [DISTWIDTH] IN BLOOD BY AUTOMATED COUNT: 12.5 % (ref 10–15)
EST. AVERAGE GLUCOSE BLD GHB EST-MCNC: 120 MG/DL
GFR SERPL CREATININE-BSD FRML MDRD: >90 ML/MIN/{1.73_M2}
GLUCOSE BLDC GLUCOMTR-MCNC: 72 MG/DL (ref 70–99)
GLUCOSE BLDC GLUCOMTR-MCNC: 76 MG/DL (ref 70–99)
GLUCOSE SERPL-MCNC: 91 MG/DL (ref 70–99)
GLUCOSE UR STRIP-MCNC: NEGATIVE MG/DL
HBA1C MFR BLD: 5.8 % (ref 0–5.6)
HCT VFR BLD AUTO: 40.5 % (ref 35–47)
HGB BLD-MCNC: 13.7 G/DL (ref 11.7–15.7)
HGB UR QL STRIP: NEGATIVE
IMM GRANULOCYTES # BLD: 0 10E9/L (ref 0–0.4)
IMM GRANULOCYTES NFR BLD: 0.3 %
KETONES UR STRIP-MCNC: NEGATIVE MG/DL
LACTATE BLD-SCNC: 1.2 MMOL/L (ref 0.7–2)
LEUKOCYTE ESTERASE UR QL STRIP: NEGATIVE
LYMPHOCYTES # BLD AUTO: 2.8 10E9/L (ref 0.8–5.3)
LYMPHOCYTES NFR BLD AUTO: 31.8 %
MCH RBC QN AUTO: 30 PG (ref 26.5–33)
MCHC RBC AUTO-ENTMCNC: 33.8 G/DL (ref 31.5–36.5)
MCV RBC AUTO: 89 FL (ref 78–100)
MONOCYTES # BLD AUTO: 0.7 10E9/L (ref 0–1.3)
MONOCYTES NFR BLD AUTO: 7.6 %
NEUTROPHILS # BLD AUTO: 4.9 10E9/L (ref 1.6–8.3)
NEUTROPHILS NFR BLD AUTO: 56.9 %
NITRATE UR QL: NEGATIVE
NRBC # BLD AUTO: 0 10*3/UL
NRBC BLD AUTO-RTO: 0 /100
PH UR STRIP: 5 PH (ref 4.7–8)
PLATELET # BLD AUTO: 193 10E9/L (ref 150–450)
POTASSIUM SERPL-SCNC: 3.8 MMOL/L (ref 3.4–5.3)
PROT SERPL-MCNC: 7.5 G/DL (ref 6.8–8.8)
RBC # BLD AUTO: 4.57 10E12/L (ref 3.8–5.2)
RBC #/AREA URNS AUTO: <1 /HPF (ref 0–2)
SODIUM SERPL-SCNC: 138 MMOL/L (ref 133–144)
SOURCE: ABNORMAL
SP GR UR STRIP: 1.01 (ref 1–1.03)
TSH SERPL DL<=0.005 MIU/L-ACNC: 2.27 MU/L (ref 0.4–4)
UROBILINOGEN UR STRIP-MCNC: NORMAL MG/DL (ref 0–2)
WBC # BLD AUTO: 8.7 10E9/L (ref 4–11)
WBC #/AREA URNS AUTO: 5 /HPF (ref 0–5)

## 2019-01-16 PROCEDURE — 25000128 H RX IP 250 OP 636: Performed by: FAMILY MEDICINE

## 2019-01-16 PROCEDURE — 81001 URINALYSIS AUTO W/SCOPE: CPT | Performed by: FAMILY MEDICINE

## 2019-01-16 PROCEDURE — 25000128 H RX IP 250 OP 636: Performed by: HOSPITALIST

## 2019-01-16 PROCEDURE — G0378 HOSPITAL OBSERVATION PER HR: HCPCS

## 2019-01-16 PROCEDURE — 99285 EMERGENCY DEPT VISIT HI MDM: CPT | Mod: 25

## 2019-01-16 PROCEDURE — 85025 COMPLETE CBC W/AUTO DIFF WBC: CPT | Performed by: FAMILY MEDICINE

## 2019-01-16 PROCEDURE — 83036 HEMOGLOBIN GLYCOSYLATED A1C: CPT | Performed by: HOSPITALIST

## 2019-01-16 PROCEDURE — 96375 TX/PRO/DX INJ NEW DRUG ADDON: CPT | Performed by: INTERNAL MEDICINE

## 2019-01-16 PROCEDURE — A9585 GADOBUTROL INJECTION: HCPCS | Performed by: RADIOLOGY

## 2019-01-16 PROCEDURE — 25500064 ZZH RX 255 OP 636: Performed by: RADIOLOGY

## 2019-01-16 PROCEDURE — 93010 ELECTROCARDIOGRAM REPORT: CPT | Performed by: INTERNAL MEDICINE

## 2019-01-16 PROCEDURE — 80053 COMPREHEN METABOLIC PANEL: CPT | Performed by: FAMILY MEDICINE

## 2019-01-16 PROCEDURE — 25000132 ZZH RX MED GY IP 250 OP 250 PS 637: Mod: GY | Performed by: HOSPITALIST

## 2019-01-16 PROCEDURE — 83605 ASSAY OF LACTIC ACID: CPT | Performed by: FAMILY MEDICINE

## 2019-01-16 PROCEDURE — 93005 ELECTROCARDIOGRAM TRACING: CPT

## 2019-01-16 PROCEDURE — 70450 CT HEAD/BRAIN W/O DYE: CPT | Mod: TC

## 2019-01-16 PROCEDURE — A9270 NON-COVERED ITEM OR SERVICE: HCPCS | Mod: GY | Performed by: HOSPITALIST

## 2019-01-16 PROCEDURE — 99285 EMERGENCY DEPT VISIT HI MDM: CPT | Mod: Z6 | Performed by: FAMILY MEDICINE

## 2019-01-16 PROCEDURE — 70553 MRI BRAIN STEM W/O & W/DYE: CPT | Mod: TC

## 2019-01-16 PROCEDURE — 40000788 ZZHCL STATISTIC ESTIMATED AVERAGE GLUCOSE: Performed by: HOSPITALIST

## 2019-01-16 PROCEDURE — 84443 ASSAY THYROID STIM HORMONE: CPT | Performed by: FAMILY MEDICINE

## 2019-01-16 PROCEDURE — 00000146 ZZHCL STATISTIC GLUCOSE BY METER IP

## 2019-01-16 PROCEDURE — 96374 THER/PROPH/DIAG INJ IV PUSH: CPT | Performed by: INTERNAL MEDICINE

## 2019-01-16 PROCEDURE — 99220 ZZC INITIAL OBSERVATION CARE,LEVL III: CPT | Performed by: HOSPITALIST

## 2019-01-16 PROCEDURE — 40000786 ZZHCL STATISTIC ACTIVE MRSA SURVEILLANCE CULTURE: Performed by: HOSPITALIST

## 2019-01-16 RX ORDER — LACTULOSE 10 G/15ML
15-30 SOLUTION ORAL DAILY PRN
Status: DISCONTINUED | OUTPATIENT
Start: 2019-01-16 | End: 2019-01-18 | Stop reason: HOSPADM

## 2019-01-16 RX ORDER — BISACODYL 5 MG
5-10 TABLET, DELAYED RELEASE (ENTERIC COATED) ORAL DAILY PRN
Status: DISCONTINUED | OUTPATIENT
Start: 2019-01-16 | End: 2019-01-18 | Stop reason: HOSPADM

## 2019-01-16 RX ORDER — OFLOXACIN 3 MG/ML
3 SOLUTION AURICULAR (OTIC) 2 TIMES DAILY
COMMUNITY
End: 2019-01-16

## 2019-01-16 RX ORDER — ONDANSETRON 4 MG/1
4 TABLET, ORALLY DISINTEGRATING ORAL EVERY 6 HOURS PRN
Status: DISCONTINUED | OUTPATIENT
Start: 2019-01-16 | End: 2019-01-18 | Stop reason: HOSPADM

## 2019-01-16 RX ORDER — SUCRALFATE 1 G/1
1 TABLET ORAL 4 TIMES DAILY
Status: DISCONTINUED | OUTPATIENT
Start: 2019-01-16 | End: 2019-01-18 | Stop reason: HOSPADM

## 2019-01-16 RX ORDER — MULTIVIT WITH MINERALS/LUTEIN
500 TABLET ORAL DAILY
Status: DISCONTINUED | OUTPATIENT
Start: 2019-01-17 | End: 2019-01-18 | Stop reason: HOSPADM

## 2019-01-16 RX ORDER — DEXTROSE MONOHYDRATE 25 G/50ML
25-50 INJECTION, SOLUTION INTRAVENOUS
Status: DISCONTINUED | OUTPATIENT
Start: 2019-01-16 | End: 2019-01-18 | Stop reason: HOSPADM

## 2019-01-16 RX ORDER — FLUTICASONE PROPIONATE 50 MCG
2 SPRAY, SUSPENSION (ML) NASAL DAILY
Status: DISCONTINUED | OUTPATIENT
Start: 2019-01-17 | End: 2019-01-18 | Stop reason: HOSPADM

## 2019-01-16 RX ORDER — ONDANSETRON 2 MG/ML
4 INJECTION INTRAMUSCULAR; INTRAVENOUS EVERY 6 HOURS PRN
Status: DISCONTINUED | OUTPATIENT
Start: 2019-01-16 | End: 2019-01-18 | Stop reason: HOSPADM

## 2019-01-16 RX ORDER — BUDESONIDE 0.25 MG/2ML
0.25 INHALANT ORAL ONCE
Status: DISCONTINUED | OUTPATIENT
Start: 2019-01-16 | End: 2019-01-16

## 2019-01-16 RX ORDER — FLUTICASONE PROPIONATE 50 MCG
2 SPRAY, SUSPENSION (ML) NASAL DAILY
COMMUNITY
End: 2019-02-26

## 2019-01-16 RX ORDER — VENLAFAXINE HYDROCHLORIDE 75 MG/1
150 TABLET, EXTENDED RELEASE ORAL
Status: DISCONTINUED | OUTPATIENT
Start: 2019-01-17 | End: 2019-01-17

## 2019-01-16 RX ORDER — HYDROCODONE BITARTRATE AND ACETAMINOPHEN 5; 325 MG/1; MG/1
1-2 TABLET ORAL EVERY 4 HOURS PRN
Status: DISCONTINUED | OUTPATIENT
Start: 2019-01-16 | End: 2019-01-18 | Stop reason: HOSPADM

## 2019-01-16 RX ORDER — NITROGLYCERIN 0.4 MG/1
0.4 TABLET SUBLINGUAL EVERY 5 MIN PRN
Status: DISCONTINUED | OUTPATIENT
Start: 2019-01-16 | End: 2019-01-18 | Stop reason: HOSPADM

## 2019-01-16 RX ORDER — BUDESONIDE AND FORMOTEROL FUMARATE DIHYDRATE 80; 4.5 UG/1; UG/1
2 AEROSOL RESPIRATORY (INHALATION) DAILY PRN
Status: DISCONTINUED | OUTPATIENT
Start: 2019-01-16 | End: 2019-01-18 | Stop reason: HOSPADM

## 2019-01-16 RX ORDER — CIPROFLOXACIN AND DEXAMETHASONE 3; 1 MG/ML; MG/ML
3 SUSPENSION/ DROPS AURICULAR (OTIC) 2 TIMES DAILY
COMMUNITY
End: 2019-01-16

## 2019-01-16 RX ORDER — TRAZODONE HYDROCHLORIDE 100 MG/1
100 TABLET ORAL AT BEDTIME
Status: DISCONTINUED | OUTPATIENT
Start: 2019-01-16 | End: 2019-01-18 | Stop reason: HOSPADM

## 2019-01-16 RX ORDER — LEVOTHYROXINE SODIUM 100 UG/1
200 TABLET ORAL DAILY
Status: DISCONTINUED | OUTPATIENT
Start: 2019-01-17 | End: 2019-01-18 | Stop reason: HOSPADM

## 2019-01-16 RX ORDER — CLONAZEPAM 0.5 MG/1
0.25 TABLET ORAL 2 TIMES DAILY
Status: DISCONTINUED | OUTPATIENT
Start: 2019-01-16 | End: 2019-01-16 | Stop reason: RX

## 2019-01-16 RX ORDER — MONTELUKAST SODIUM 10 MG/1
10 TABLET ORAL AT BEDTIME
Status: DISCONTINUED | OUTPATIENT
Start: 2019-01-16 | End: 2019-01-18 | Stop reason: HOSPADM

## 2019-01-16 RX ORDER — METHYLPHENIDATE HYDROCHLORIDE 10 MG/1
20 TABLET ORAL 2 TIMES DAILY
Status: DISCONTINUED | OUTPATIENT
Start: 2019-01-16 | End: 2019-01-18 | Stop reason: HOSPADM

## 2019-01-16 RX ORDER — SODIUM CHLORIDE 9 MG/ML
1000 INJECTION, SOLUTION INTRAVENOUS CONTINUOUS
Status: DISCONTINUED | OUTPATIENT
Start: 2019-01-16 | End: 2019-01-16

## 2019-01-16 RX ORDER — TOLTERODINE 4 MG/1
4 CAPSULE, EXTENDED RELEASE ORAL DAILY
Status: DISCONTINUED | OUTPATIENT
Start: 2019-01-17 | End: 2019-01-18 | Stop reason: HOSPADM

## 2019-01-16 RX ORDER — GADOBUTROL 604.72 MG/ML
10 INJECTION INTRAVENOUS ONCE
Status: COMPLETED | OUTPATIENT
Start: 2019-01-16 | End: 2019-01-16

## 2019-01-16 RX ORDER — ALBUTEROL SULFATE 5 MG/ML
2.5 SOLUTION RESPIRATORY (INHALATION) ONCE
Status: DISCONTINUED | OUTPATIENT
Start: 2019-01-16 | End: 2019-01-16

## 2019-01-16 RX ORDER — VENLAFAXINE HYDROCHLORIDE 37.5 MG/1
37.5 CAPSULE, EXTENDED RELEASE ORAL
Status: DISCONTINUED | OUTPATIENT
Start: 2019-01-17 | End: 2019-01-18 | Stop reason: HOSPADM

## 2019-01-16 RX ORDER — NALOXONE HYDROCHLORIDE 0.4 MG/ML
.1-.4 INJECTION, SOLUTION INTRAMUSCULAR; INTRAVENOUS; SUBCUTANEOUS
Status: DISCONTINUED | OUTPATIENT
Start: 2019-01-16 | End: 2019-01-18 | Stop reason: HOSPADM

## 2019-01-16 RX ORDER — AMMONIUM LACTATE 12 G/100G
CREAM TOPICAL 2 TIMES DAILY PRN
Status: DISCONTINUED | OUTPATIENT
Start: 2019-01-16 | End: 2019-01-18 | Stop reason: HOSPADM

## 2019-01-16 RX ORDER — BISACODYL 5 MG/1
1-2 TABLET, DELAYED RELEASE ORAL DAILY PRN
COMMUNITY
End: 2019-02-26

## 2019-01-16 RX ORDER — MECLIZINE HCL 12.5 MG 12.5 MG/1
25 TABLET ORAL 3 TIMES DAILY
Status: DISCONTINUED | OUTPATIENT
Start: 2019-01-16 | End: 2019-01-18 | Stop reason: HOSPADM

## 2019-01-16 RX ORDER — ACETAMINOPHEN 650 MG/1
650 SUPPOSITORY RECTAL EVERY 4 HOURS PRN
Status: DISCONTINUED | OUTPATIENT
Start: 2019-01-16 | End: 2019-01-18 | Stop reason: HOSPADM

## 2019-01-16 RX ORDER — LISINOPRIL 5 MG/1
5 TABLET ORAL DAILY
Status: DISCONTINUED | OUTPATIENT
Start: 2019-01-17 | End: 2019-01-18 | Stop reason: HOSPADM

## 2019-01-16 RX ORDER — NIACIN 500 MG/1
1000 TABLET, EXTENDED RELEASE ORAL AT BEDTIME
Status: DISCONTINUED | OUTPATIENT
Start: 2019-01-16 | End: 2019-01-18 | Stop reason: HOSPADM

## 2019-01-16 RX ORDER — CLONAZEPAM 0.25 MG/1
0.25 TABLET, ORALLY DISINTEGRATING ORAL 2 TIMES DAILY
Status: DISCONTINUED | OUTPATIENT
Start: 2019-01-16 | End: 2019-01-18 | Stop reason: HOSPADM

## 2019-01-16 RX ORDER — ASPIRIN 81 MG/1
81 TABLET ORAL DAILY
Status: DISCONTINUED | OUTPATIENT
Start: 2019-01-16 | End: 2019-01-18 | Stop reason: HOSPADM

## 2019-01-16 RX ORDER — SUCRALFATE 1 G/1
1 TABLET ORAL 4 TIMES DAILY
COMMUNITY
End: 2019-02-26

## 2019-01-16 RX ORDER — GABAPENTIN 300 MG/1
300 CAPSULE ORAL 2 TIMES DAILY
Status: DISCONTINUED | OUTPATIENT
Start: 2019-01-16 | End: 2019-01-18 | Stop reason: HOSPADM

## 2019-01-16 RX ORDER — ACETAMINOPHEN 325 MG/1
650 TABLET ORAL EVERY 4 HOURS PRN
Status: DISCONTINUED | OUTPATIENT
Start: 2019-01-16 | End: 2019-01-18 | Stop reason: HOSPADM

## 2019-01-16 RX ORDER — GABAPENTIN 300 MG/1
400 CAPSULE ORAL 2 TIMES DAILY PRN
COMMUNITY
End: 2019-02-26

## 2019-01-16 RX ORDER — ACETAMINOPHEN 325 MG/1
500 TABLET ORAL EVERY 6 HOURS PRN
Status: DISCONTINUED | OUTPATIENT
Start: 2019-01-16 | End: 2019-01-16

## 2019-01-16 RX ORDER — NICOTINE POLACRILEX 4 MG
15-30 LOZENGE BUCCAL
Status: DISCONTINUED | OUTPATIENT
Start: 2019-01-16 | End: 2019-01-18 | Stop reason: HOSPADM

## 2019-01-16 RX ORDER — KETOROLAC TROMETHAMINE 15 MG/ML
15 INJECTION, SOLUTION INTRAMUSCULAR; INTRAVENOUS ONCE
Status: COMPLETED | OUTPATIENT
Start: 2019-01-16 | End: 2019-01-16

## 2019-01-16 RX ADMIN — KETOROLAC TROMETHAMINE 15 MG: 15 INJECTION, SOLUTION INTRAMUSCULAR; INTRAVENOUS at 21:48

## 2019-01-16 RX ADMIN — SODIUM CHLORIDE 1000 ML: 9 INJECTION, SOLUTION INTRAVENOUS at 16:20

## 2019-01-16 RX ADMIN — GADOBUTROL 10 ML: 604.72 INJECTION INTRAVENOUS at 17:01

## 2019-01-16 RX ADMIN — MECLIZINE HCL 25 MG: 12.5 TABLET ORAL at 21:48

## 2019-01-16 RX ADMIN — RANITIDINE 150 MG: 150 TABLET ORAL at 21:48

## 2019-01-16 RX ADMIN — CLONAZEPAM 0.25 MG: 0.25 TABLET, ORALLY DISINTEGRATING ORAL at 21:59

## 2019-01-16 RX ADMIN — PROCHLORPERAZINE EDISYLATE 5 MG: 5 INJECTION INTRAMUSCULAR; INTRAVENOUS at 21:48

## 2019-01-16 RX ADMIN — TRAZODONE HYDROCHLORIDE 100 MG: 100 TABLET ORAL at 21:48

## 2019-01-16 RX ADMIN — SODIUM CHLORIDE 1000 ML: 9 INJECTION, SOLUTION INTRAVENOUS at 19:38

## 2019-01-16 RX ADMIN — MONTELUKAST 10 MG: 10 TABLET, FILM COATED ORAL at 21:48

## 2019-01-16 RX ADMIN — ENOXAPARIN SODIUM 40 MG: 40 INJECTION SUBCUTANEOUS at 21:49

## 2019-01-16 ASSESSMENT — MIFFLIN-ST. JEOR: SCORE: 1782.13

## 2019-01-16 NOTE — ED NOTES
Pt brought in by friend. Friend noticed yesterday pt c/o left arm weakness and pt wasn't able to remember her evening prayer. Pt stated yesterday a headache started with a stiff neck.Speech slower than reported pt's normal.

## 2019-01-16 NOTE — ED PROVIDER NOTES
"eMERGENCY dEPARTMENT eNCOUnter        CHIEF COMPLAINT    Chief Complaint   Patient presents with     One-sided Weakness     c/o left sided weakness since yesterday around 4pm. pt states she hasn't felt good since she woke up since yesterday morning. per pt last known well time. delayed speech. difficulty finding words.        HPI    Mary Jean Lesch is a 63 year old female who presents \"feeling like my whole body is slowing down\".  Marisol Red is brought in by a friend.  Last known well was yesterday.  Friend noticed left arm weakness yesterday, today her home nurse came and wanted her evaluated. She has a history of TBI many years ago.  Friend says this is not normal speech for her. Is slower.   Pt said yesterday she had sudden onset of migraine, she used to have migraines but hasn't in a while.   Last night she was at a prayer meeting and reading something from her phone.  She said the words to herself, could read and understand them, but when she spoke them outloud, \"they weren't the right words\".    REVIEW OF SYSTEMS    Neurologic: no LOC, No hearing loss  Cardiac: No Chest Pain, no syncope  Respiratory: No cough or difficulty breathing  GI: No Bloody Stool or Diarrhea  : No Dysuria or Hematuria  General: No Fever  All other systems reviewed and are negative.    PAST MEDICAL & SURGICAL HISTORY    Past Medical History:   Diagnosis Date     Presence of both artificial knee joints     6/10/2016     Past Surgical History:   Procedure Laterality Date     COLONOSCOPY - HIM SCAN  10/07/2013    [place] [facility] [place]  10[place]2013       CURRENT MEDICATIONS    Current Outpatient Rx   Medication Sig Dispense Refill     ascorbic acid (VITAMIN C) 500 MG tablet Take 500 mg by mouth daily        cholecalciferol (VITAMIN  -D) 1000 UNITS capsule Take 1 capsule by mouth daily       clonazePAM (KLONOPIN) 0.5 MG tablet Take 0.25 mg by mouth 2 times daily    "     fluticasone (FLONASE) 50 MCG/ACT nasal spray Spray 2 sprays into both nostrils daily       levothyroxine (SYNTHROID/LEVOTHROID) 200 MCG tablet Take 1 tablet by mouth daily       meclizine (ANTIVERT) 25 MG tablet Take 1 tablet by mouth 3 times daily        metFORMIN (GLUCOPHAGE-XR) 500 MG 24 hr tablet Take 1 tablet by mouth daily       methylphenidate (RITALIN) 20 MG tablet Take 20 mg by mouth 2 times daily        montelukast (SINGULAIR) 10 MG tablet Take 10 mg by mouth At Bedtime        MULTIPLE VITAMINS PO Take 1 tablet by mouth daily        niacin (NIASPAN) 500 MG CR tablet Take 2 tablets (1,000 mg) by mouth At Bedtime 30 tablet      omeprazole (PRILOSEC) 40 MG capsule Take 40 mg by mouth daily        RANITIDINE HCL PO Take 150 mg by mouth 2 times daily        simvastatin (ZOCOR) 80 MG tablet Take 80 mg by mouth At Bedtime        solifenacin (VESICARE) 10 MG tablet Take 10 mg by mouth daily        traZODone (DESYREL) 100 MG tablet Take 1 tablet (100 mg) by mouth At Bedtime Take 2 at bedtime (Patient taking differently: Take 100 mg by mouth At Bedtime ) 90 tablet      venlafaxine (EFFEXOR-ER) 150 MG TB24 Take 150 mg by mouth 2 times daily        venlafaxine (EFFEXOR-XR) 37.5 MG 24 hr capsule Take 1 capsule (37.5 mg) by mouth daily (with lunch) (Patient taking differently: Take 37.5 mg by mouth daily ) 30 capsule      Acetaminophen (TYLENOL PO) Take 500 mg by mouth every 6 hours as needed for mild pain        ammonium lactate (AMLACTIN) 12 % cream Apply topically 2 times daily as needed for dry skin        bisacodyl (DULCOLAX) 5 MG EC tablet Take 1-2 tablets by mouth daily as needed for constipation       Budesonide-Formoterol Fumarate (SYMBICORT IN) Inhale 2 puffs into the lungs daily as needed        clindamycin (CLEOCIN T) 1 % lotion Apply topically 2 times daily as needed        EPINEPHrine (EPIPEN) 0.3 MG/0.3ML injection Inject 0.3 mg into the muscle once as needed For anaphylaxis       gabapentin  (NEURONTIN) 300 MG capsule Take 300 mg by mouth 2 times daily as needed       HYDROcodone-acetaminophen (NORCO) 5-325 MG per tablet Take 1-2 tablets by mouth every 4 hours as needed for other (Moderate to Severe Pain) (Patient taking differently: Take 1 tablet by mouth every 6 hours as needed (Moderate to Severe Pain) ) 25 tablet 0     lactulose (CHRONULAC) 10 GM/15ML solution Take 15-30 mLs by mouth daily as needed        mupirocin (BACTROBAN) 2 % nasal ointment Apply 1 g into each nare 2 times daily as needed (dry skin)        neomycin-polymyxin-HC 1 % SOLN Place 1 drop Into the left ear 3 times daily as needed (EAR PAIN)        nitroglycerin (NITROSTAT) 0.4 MG SL tablet Place 1 tablet (0.4MG) by sublingual route at the 1st sign of attack; may repeat every 5 min until relief; if pain persists after 3 tablets in 15 min, prompt medical attention is recommended       nystatin (MYCOSTATIN) cream Apply topically 2 times daily To affected skin, until healed (Patient taking differently: Apply topically 2 times daily as needed ) 15 g 0     sucralfate (CARAFATE) 1 GM tablet Take 1 g by mouth 4 times daily       TIZANIDINE HCL PO Take 4 mg by mouth every 8 hours as needed for muscle spasms       triamcinolone (KENALOG) 0.1 % cream Apply topically 3 times daily as needed          ALLERGIES    Allergies   Allergen Reactions     Strawberry Anaphylaxis       SOCIAL & FAMILY HISTORY    Social History     Socioeconomic History     Marital status:      Spouse name: Not on file     Number of children: Not on file     Years of education: Not on file     Highest education level: Not on file   Social Needs     Financial resource strain: Not on file     Food insecurity - worry: Not on file     Food insecurity - inability: Not on file     Transportation needs - medical: Not on file     Transportation needs - non-medical: Not on file   Occupational History     Not on file   Tobacco Use     Smoking status: Never Smoker      Smokeless tobacco: Never Used   Substance and Sexual Activity     Alcohol use: Not on file     Drug use: Not on file     Sexual activity: Not on file   Other Topics Concern     Not on file   Social History Narrative     Not on file     No family history on file.    PHYSICAL EXAM    VITAL SIGNS: /77   Temp 98.5  F (36.9  C) (Oral)   Resp 16   SpO2 97%    Constitutional:  Well developed, well nourished, no acute distress, she has a very flat affect and speaks very slowly.  Does not seem drowsy,   Eyes: Pupils equally round and react to light, sclera nonicteric  HENT:  Atraumatic, no trismus,TMs appear clear  Neck: supple, no JVD, no posterior neck tenderness  Respiratory:  Lungs Clear, no retractions   Cardiovascular:  regular rate, no murmurs  GI:  Soft, nontender, normal bowel sounds  Musculoskeletal:  No edema, no bruising  Vascular:  All  pulses are 2+ equal bilaterally  Integument:  Well hydrated, no petechiae   Neurologic:  Alert & oriented, no slurred speech  Psych: Pleasant affect, no hallucinations    EKG    NSR, no ischemic changes    RADIOLOGY  (read by the radiologist)  Results for orders placed or performed during the hospital encounter of 01/16/19   CT Head w/o Contrast    Narrative    PROCEDURE: CT HEAD W/O CONTRAST   1/16/2019 3:32 PM    HISTORY:Female, age,  63 years, , , Headache, acute, severe, worst HA  of life    COMPARISON: Head CT 1/21/2017    TECHNIQUE: CT of the brain without contrast.    FINDINGS: Ventricles and sulci are normal in size and shape. Gray and  white matter demonstrate scattered areas of decreased density.    There is no evidence of mass, mass effect or midline shift. No  evidence of acute hemorrhage.    Bony structures are similar appearance with hyperostosis frontalis.       Impression    IMPRESSION:   No acute intracranial abnormality. No significant change compared to  1/21/2017.    Paranasal sinuses are normal.    DMITRIY MUNSON MD   MR Brain w/o & w Contrast     "Narrative    MR BRAIN W/O & W CONTRAST    HISTORY: 63 years Female Focal neuro deficit, > 6 hrs, stroke  suspected; patient with left arm weakness, slowed speech    COMPARISON: CT dated 1/16/2019    TECHNIQUE: Routine multisequence and multiplanar MRI imaging of the  brain was performed with contrast.    FINDINGS    The cervical medullary junction is normal in position. There is no  evidence of a sellar or suprasellar mass.  No diffusion signal abnormalities are present suggest an acute or  subacute infarct.  Ventricles and sulci are symmetric without midline shift or mass  effect.  There is no evidence of intracranial mass or mass effect.  No abnormal  enhancement is present within the brain or extra-axial spaces.      Impression    IMPRESSION: Negative study. There is no evidence of an acute or  subacute infarct. There is no evidence of intracranial mass.    GIGI MOYER MD         ED COURSE & MEDICAL DECISION MAKING    Pertinent Labs & Imaging studies reviewed and interpreted. (See chart for details)    See chart for details of medications given during the ED stay.    Vitals:    01/16/19 1730 01/16/19 1800 01/16/19 1830 01/16/19 1940   BP: 168/72 162/76 180/86 165/77   Resp:    16   Temp:    98.5  F (36.9  C)   TempSrc:    Oral   SpO2: 99% 96% 96% 97%           FINAL IMPRESSION    1. Bradykinesia        PLAN  This was really a long time in sorting out.  Originally concerns about a neurologic cause, in further discussions when I asked about her use of Ritalin and Klonopin, she says she is under care of psychiatrist with diagnosis of anxiety, maybe bipolar, they don't really remember, and the Ritalin is used to \"give me a boost, I hit a wall about 3 in the afternoon.\"  She works as a cook at [employer].  She says she is too busy to eat much and when she gets home she is too tired.   She has a normal exam now, except for profound bradykinesia, non focal.  Doesn't really have parkinsonian findings.    I " think the differential includes over medicated, she is on benzo, also possible hemiplegic/opthalmic migraine.    Consulted neurology, agreed with workup and differential.  Other possibility would be focal seizure.  Observe for tonight, see if mental status clears, consider EEG.        Chiqui Tian MD  01/16/19 1946

## 2019-01-16 NOTE — ED NOTES
Pt to CT.  aware of pt in ER and will be back in approx 1 hour. Pt c/o dizziness when she moves her head.

## 2019-01-16 NOTE — ED NOTES
Pt stated she didn't feel well when she woke up yesterday am. Pt stated yesterday she became dizzy when moving her head.

## 2019-01-17 ENCOUNTER — APPOINTMENT (OUTPATIENT)
Dept: OCCUPATIONAL THERAPY | Facility: HOSPITAL | Age: 64
End: 2019-01-17
Payer: MEDICARE

## 2019-01-17 LAB
GLUCOSE BLDC GLUCOMTR-MCNC: 110 MG/DL (ref 70–99)
GLUCOSE BLDC GLUCOMTR-MCNC: 64 MG/DL (ref 70–99)
GLUCOSE BLDC GLUCOMTR-MCNC: 71 MG/DL (ref 70–99)
GLUCOSE BLDC GLUCOMTR-MCNC: 77 MG/DL (ref 70–99)
GLUCOSE BLDC GLUCOMTR-MCNC: 83 MG/DL (ref 70–99)
GLUCOSE BLDC GLUCOMTR-MCNC: 91 MG/DL (ref 70–99)
GLUCOSE BLDC GLUCOMTR-MCNC: 94 MG/DL (ref 70–99)
GLUCOSE BLDC GLUCOMTR-MCNC: 98 MG/DL (ref 70–99)

## 2019-01-17 PROCEDURE — G0378 HOSPITAL OBSERVATION PER HR: HCPCS

## 2019-01-17 PROCEDURE — 40000133 ZZH STATISTIC OT WARD VISIT

## 2019-01-17 PROCEDURE — 25000132 ZZH RX MED GY IP 250 OP 250 PS 637: Mod: GY | Performed by: NURSE PRACTITIONER

## 2019-01-17 PROCEDURE — 97166 OT EVAL MOD COMPLEX 45 MIN: CPT | Mod: GO

## 2019-01-17 PROCEDURE — 25000132 ZZH RX MED GY IP 250 OP 250 PS 637: Mod: GY | Performed by: HOSPITALIST

## 2019-01-17 PROCEDURE — A9270 NON-COVERED ITEM OR SERVICE: HCPCS | Mod: GY | Performed by: HOSPITALIST

## 2019-01-17 PROCEDURE — 00000146 ZZHCL STATISTIC GLUCOSE BY METER IP

## 2019-01-17 PROCEDURE — 96372 THER/PROPH/DIAG INJ SC/IM: CPT | Performed by: INTERNAL MEDICINE

## 2019-01-17 PROCEDURE — 99225 ZZC SUBSEQUENT OBSERVATION CARE,LEVEL II: CPT | Performed by: NURSE PRACTITIONER

## 2019-01-17 PROCEDURE — A9270 NON-COVERED ITEM OR SERVICE: HCPCS | Mod: GY | Performed by: NURSE PRACTITIONER

## 2019-01-17 PROCEDURE — 25000128 H RX IP 250 OP 636: Performed by: HOSPITALIST

## 2019-01-17 RX ORDER — VENLAFAXINE HYDROCHLORIDE 150 MG/1
150 CAPSULE, EXTENDED RELEASE ORAL
Status: DISCONTINUED | OUTPATIENT
Start: 2019-01-17 | End: 2019-01-18 | Stop reason: HOSPADM

## 2019-01-17 RX ORDER — SUMATRIPTAN 25 MG/1
25 TABLET, FILM COATED ORAL ONCE
Status: COMPLETED | OUTPATIENT
Start: 2019-01-17 | End: 2019-01-17

## 2019-01-17 RX ADMIN — METHYLPHENIDATE HYDROCHLORIDE 20 MG: 10 TABLET ORAL at 22:06

## 2019-01-17 RX ADMIN — SUCRALFATE 1 G: 1 TABLET ORAL at 12:23

## 2019-01-17 RX ADMIN — TOLTERODINE 4 MG: 4 CAPSULE, EXTENDED RELEASE ORAL at 08:39

## 2019-01-17 RX ADMIN — SUCRALFATE 1 G: 1 TABLET ORAL at 18:39

## 2019-01-17 RX ADMIN — RANITIDINE 150 MG: 150 TABLET ORAL at 08:39

## 2019-01-17 RX ADMIN — LEVOTHYROXINE SODIUM 200 MCG: 100 TABLET ORAL at 08:39

## 2019-01-17 RX ADMIN — ASCORBIC ACID TAB 250 MG 500 MG: 250 TAB at 08:39

## 2019-01-17 RX ADMIN — VENLAFAXINE HYDROCHLORIDE 150 MG: 150 CAPSULE, EXTENDED RELEASE ORAL at 15:53

## 2019-01-17 RX ADMIN — ACETAMINOPHEN 650 MG: 325 TABLET, FILM COATED ORAL at 07:53

## 2019-01-17 RX ADMIN — FLUTICASONE PROPIONATE 2 SPRAY: 50 SPRAY, METERED NASAL at 08:42

## 2019-01-17 RX ADMIN — SUCRALFATE 1 G: 1 TABLET ORAL at 08:39

## 2019-01-17 RX ADMIN — SUMATRIPTAN 25 MG: 25 TABLET, FILM COATED ORAL at 09:19

## 2019-01-17 RX ADMIN — MECLIZINE HCL 25 MG: 12.5 TABLET ORAL at 14:53

## 2019-01-17 RX ADMIN — HYDROCODONE BITARTRATE AND ACETAMINOPHEN 1 TABLET: 5; 325 TABLET ORAL at 12:08

## 2019-01-17 RX ADMIN — METHYLPHENIDATE HYDROCHLORIDE 20 MG: 10 TABLET ORAL at 08:39

## 2019-01-17 RX ADMIN — VENLAFAXINE HYDROCHLORIDE 150 MG: 150 CAPSULE, EXTENDED RELEASE ORAL at 11:12

## 2019-01-17 RX ADMIN — VENLAFAXINE HYDROCHLORIDE 37.5 MG: 37.5 CAPSULE, EXTENDED RELEASE ORAL at 12:23

## 2019-01-17 RX ADMIN — RANITIDINE 150 MG: 150 TABLET ORAL at 22:06

## 2019-01-17 RX ADMIN — LISINOPRIL 5 MG: 5 TABLET ORAL at 08:39

## 2019-01-17 RX ADMIN — NIACIN 1000 MG: 500 TABLET, EXTENDED RELEASE ORAL at 22:12

## 2019-01-17 RX ADMIN — MONTELUKAST 10 MG: 10 TABLET, FILM COATED ORAL at 22:06

## 2019-01-17 RX ADMIN — GABAPENTIN 300 MG: 300 CAPSULE ORAL at 09:19

## 2019-01-17 RX ADMIN — MECLIZINE HCL 25 MG: 12.5 TABLET ORAL at 08:39

## 2019-01-17 RX ADMIN — TRAZODONE HYDROCHLORIDE 100 MG: 100 TABLET ORAL at 22:06

## 2019-01-17 RX ADMIN — MECLIZINE HCL 25 MG: 12.5 TABLET ORAL at 22:06

## 2019-01-17 RX ADMIN — SUCRALFATE 1 G: 1 TABLET ORAL at 22:06

## 2019-01-17 RX ADMIN — GABAPENTIN 300 MG: 300 CAPSULE ORAL at 22:06

## 2019-01-17 RX ADMIN — CLONAZEPAM 0.25 MG: 0.25 TABLET, ORALLY DISINTEGRATING ORAL at 08:39

## 2019-01-17 RX ADMIN — ENOXAPARIN SODIUM 40 MG: 40 INJECTION SUBCUTANEOUS at 22:04

## 2019-01-17 RX ADMIN — OMEPRAZOLE 40 MG: 20 CAPSULE, DELAYED RELEASE ORAL at 08:39

## 2019-01-17 RX ADMIN — CLONAZEPAM 0.25 MG: 0.25 TABLET, ORALLY DISINTEGRATING ORAL at 22:06

## 2019-01-17 NOTE — ED NOTES
"Per Dr. Tian, patient up and ambulated in the [facility]. Upon sitting up in bed, patient initially felt dizzy and that feeling subsided. Patient able to ambulate independently with nursing walking next to patient. Patient states that home is one level with basement and would feel ok ambulating around home. Patient notes 4 steps up into home, \"but they have railing\". Reported to Dr. Tian.  "

## 2019-01-17 NOTE — PLAN OF CARE
[facility] Inpatient Admission Note:    Patient admitted to 3224/3224-1 at approximately 2020 via bed accompanied by transport tech from emergency room . Report received from YAIR Reilly in SBAR format at 2000 via telephone. Patient transferred to bed via self.. Patient is alert and oriented X 3, reports pain; rates at 3 on 0-10 scale.  Patient oriented to room, unit, hourly rounding, and plan of care. Explained admission packet and patient handbook with patient bill of rights brochure. Will continue to monitor and document as needed.     Inpatient Nursing criteria listed below was met:    Health care directives status obtained and documented: Yes    Care Everywhere authorization obtained No    MRSA swab completed for patient 65 years and older: Yes    Patient identifies a surrogate decision maker: Yes If yes, who:  Jeyson Contact Information: see [facility]    Core Measure diagnosis present:No.      If initial lactic acid >2.0, repeat lactic acid drawn within one hour of arrival to unit: NA. If no, state reason:     Vaccination assessment and education completed: Yes   Vaccinations received prior to admission: Pneumovax yes  Influenza(seasonal)  YES   Vaccination(s) ordered: n/a    Clergy visit ordered if patient requests: N/A    Skin issues/needs documented: Yes    Isolation Patient: no Education given, correct sign in place and documentation row added to PCS:  No    Fall Prevention Yes: Care plan updated, education given and documented, sticker and magnet in place: Yes    Care Plan initiated: Yes    Education Documented (including assessment): Yes    Patient has discharge needs : No If yes, please explain:
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
## 2019-01-17 NOTE — H&P
[facility]    History and Physical - Hospitalist Service       Date of Admission:  1/16/2019    Assessment & Plan   Mary Jean Lesch is a 63 year old female with history of diabetes mellitus, hyperlipidemia, traumatic brain injury with memory issues, migraines, depression with anxiety presented to the ED with bradykinesia and headaches.  Her issues are the following:    Bradykinesia with confusion: The patient had an MRI done in the ED that was negative for stroke.  She also has a history of previous such episodes.  In the past seizures were considered but she was never officially diagnosed with a seizure disorder.  She is also on multiple medications that could cause these symptoms including benzodiazepines, antidepressants and methylphenidate.  At this point she is improving.  We will observe her overnight.  She will definitely need to see a neurologist as an outpatient and EEG can be considered as well.  I will start her on low-dose aspirin and will get neuro checks overnight.  Will get PT/OT evaluation.  She also has multiple psych issues so somatization is also a possibility.    Migraine headaches: I will give her a dose of Toradol and Compazine to help her headaches.    Diabetes mellitus: Hold metformin and keep her on sliding scale insulin.  Check HbA1c.    Hypertension: The patient was rather hypertensive in the ED.  I will start her on lisinopril 5 mg every day    History of traumatic brain injury: Continue home medications for now including Ritalin, Effexor and clonazepam.  She was also on Depakote in the past.    Hypothyroidism: TSH was 2.2.  Continue on current dose of levothyroxine.       Diet: Moderate Consistent CHO Diet    DVT Prophylaxis: Enoxaparin (Lovenox) SQ  Calle Catheter: not present  Code Status: Full Code      Disposition Plan   Expected discharge: Tomorrow, recommended to prior living arrangement once mental status at baseline.  We will observe her overnight with neuro checks.   If she continues to improve, she can be discharged home to follow-up with neurology as an outpatient.  At this point I will not make any changes to her regimen.  Entered: Lauren Girard MD 01/16/2019, 9:08 PM     The patient's care was discussed with the Bedside Nurse.    Lauren Girard MD  [facility]    ______________________________________________________________________    Chief Complaint   Slow Speech and weakness with headaches    History is obtained from the patient    History of Present Illness   Mary Jean Lesch is a 63 year old female with history of diabetes mellitus, hyperlipidemia, traumatic brain injury with memory issues, migraines, depression with anxiety presented to the ED with a feeling of slowing down of the movements as well as speech for the last couple of days.  The patient has a history of TBI in 1998 and after that she has had memory issues along with learning disabilities for which she gets home health care and behavioral health support.  She also gets some migraines as well.  She has a history of previous such episodes of slowing down of the movement attributed to a traumatic brain injury versus seizures.  Today she came to the ED where the workup was negative for stroke.  She also reports generalized weakness as well.  She reported slurring of speech but it was more like slowing down.  Her movements have also become slow.  In the ED MRI and CT scan were negative.  Neurology was also consulted and they suggested overnight observation as well as an EEG and outpatient neurology consultation.  The patient denies any fevers or chills.  The headaches are on the right side.  She was also a bit hypertensive in the ED.  The patient denies any double vision.  At the time of history taking she denies any slurring of speech or blurring of vision.Last night she was at a prayer meeting and reading something from her phone.  She said the words to herself, could read and understand them, but when  "she spoke them outloud, \"they weren't the right words\".  The patient denies any chest pain or shortness of breath.  Denies abdominal pain diarrhea or constipation.  She denies any nausea or vomiting.  She denies any dysuria urgency or frequency.  She does have seasonal allergies.      Review of Systems    The 10 point Review of Systems is negative other than noted in the HPI   Cardiac: No Chest Pain, no syncope  Respiratory: No cough or difficulty breathing  GI: No Bloody Stool or Diarrhea  : No Dysuria or Hematuria  General: No Fever      Past Medical History    I have reviewed this patient's medical history and updated it with pertinent information if needed.   Past Medical History:   Diagnosis Date     Presence of both artificial knee joints     6/10/2016   Diabetes mellitus  Hyperlipidemia  Seasonal allergies  Traumatic brain injury  Attention deficit syndrome due to TBI  Depression with anxiety  Hypothyroidism     Past Surgical History   I have reviewed this patient's surgical history and updated it with pertinent information if needed.  Past Surgical History:   Procedure Laterality Date     COLONOSCOPY - HIM SCAN  10/07/2013    Colonoscopy with internal hemorrhoidectomy with Dr. Adela Marshall at [facility] [place] - 10 year repeat recommended  10/2013       Social History   I have reviewed this patient's social history and updated it with pertinent information if needed.  Social History     Tobacco Use     Smoking status: Never Smoker     Smokeless tobacco: Never Used   Substance Use Topics     Alcohol use: Not on file     Drug use: Not on file       Family History   I have reviewed this patient's family history and updated it with pertinent information if needed.   No family history on file.    Prior to Admission Medications   Prior to Admission Medications   Prescriptions Last Dose Informant Patient Reported? Taking?   Acetaminophen (TYLENOL PO) Unknown at prn Other Yes No   Sig: Take 500 mg by mouth every 6 hours as " needed for mild pain    Budesonide-Formoterol Fumarate (SYMBICORT IN) Unknown at prn Other Yes No   Sig: Inhale 2 puffs into the lungs daily as needed    EPINEPHrine (EPIPEN) 0.3 MG/0.3ML injection Unknown at prn Other Yes No   Sig: Inject 0.3 mg into the muscle once as needed For anaphylaxis   HYDROcodone-acetaminophen (NORCO) 5-325 MG per tablet Unknown at prn Other No No   Sig: Take 1-2 tablets by mouth every 4 hours as needed for other (Moderate to Severe Pain)   Patient taking differently: Take 1 tablet by mouth every 6 hours as needed (Moderate to Severe Pain)    MULTIPLE VITAMINS PO 1/16/2019 at 0800 Other Yes Yes   Sig: Take 1 tablet by mouth daily    RANITIDINE HCL PO 1/16/2019 at 0800 Other Yes Yes   Sig: Take 150 mg by mouth 2 times daily    TIZANIDINE HCL PO Unknown at prn Other Yes No   Sig: Take 4 mg by mouth every 8 hours as needed for muscle spasms   ammonium lactate (AMLACTIN) 12 % cream Unknown at prn Other Yes No   Sig: Apply topically 2 times daily as needed for dry skin    ascorbic acid (VITAMIN C) 500 MG tablet 1/16/2019 at 0800 Other Yes Yes   Sig: Take 500 mg by mouth daily    bisacodyl (DULCOLAX) 5 MG EC tablet Unknown at prn Other Yes No   Sig: Take 1-2 tablets by mouth daily as needed for constipation   cholecalciferol (VITAMIN  -D) 1000 UNITS capsule 1/16/2019 at 0800 Other Yes Yes   Sig: Take 1 capsule by mouth daily   clindamycin (CLEOCIN T) 1 % lotion Unknown at prn Other Yes No   Sig: Apply topically 2 times daily as needed    clonazePAM (KLONOPIN) 0.5 MG tablet 1/16/2019 at 0800 Other Yes Yes   Sig: Take 0.25 mg by mouth 2 times daily    fluticasone (FLONASE) 50 MCG/ACT nasal spray 1/16/2019 at 0800 Other Yes Yes   Sig: Spray 2 sprays into both nostrils daily   gabapentin (NEURONTIN) 300 MG capsule Unknown at prn Other Yes No   Sig: Take 300 mg by mouth 2 times daily as needed   lactulose (CHRONULAC) 10 GM/15ML solution Unknown at prn Other Yes No   Sig: Take 15-30 mLs by mouth daily  as needed    levothyroxine (SYNTHROID/LEVOTHROID) 200 MCG tablet 1/16/2019 at 0800 Other Yes Yes   Sig: Take 1 tablet by mouth daily   meclizine (ANTIVERT) 25 MG tablet 1/16/2019 at 0800 Other Yes Yes   Sig: Take 1 tablet by mouth 3 times daily    metFORMIN (GLUCOPHAGE-XR) 500 MG 24 hr tablet 1/16/2019 at 0800 Other Yes Yes   Sig: Take 1 tablet by mouth daily   methylphenidate (RITALIN) 20 MG tablet 1/16/2019 at 0800 Other Yes Yes   Sig: Take 20 mg by mouth 2 times daily    montelukast (SINGULAIR) 10 MG tablet 1/15/2019 at 2000 Other Yes Yes   Sig: Take 10 mg by mouth At Bedtime    mupirocin (BACTROBAN) 2 % nasal ointment Unknown at prn Other Yes No   Sig: Apply 1 g into each nare 2 times daily as needed (dry skin)    neomycin-polymyxin-HC 1 % SOLN Unknown at prn Other Yes No   Sig: Place 1 drop Into the left ear 3 times daily as needed (EAR PAIN)    niacin (NIASPAN) 500 MG CR tablet 1/15/2019 at 2000 Other No Yes   Sig: Take 2 tablets (1,000 mg) by mouth At Bedtime   nitroglycerin (NITROSTAT) 0.4 MG SL tablet Unknown at Unknown time Other Yes No   Sig: Place 1 tablet (0.4MG) by sublingual route at the 1st sign of attack; may repeat every 5 min until relief; if pain persists after 3 tablets in 15 min, prompt medical attention is recommended   nystatin (MYCOSTATIN) cream Unknown at prn Other No No   Sig: Apply topically 2 times daily To affected skin, until healed   Patient taking differently: Apply topically 2 times daily as needed    omeprazole (PRILOSEC) 40 MG capsule 1/16/2019 at 0800 Other Yes Yes   Sig: Take 40 mg by mouth daily    simvastatin (ZOCOR) 80 MG tablet 1/15/2019 at 2000 Other Yes Yes   Sig: Take 80 mg by mouth At Bedtime    solifenacin (VESICARE) 10 MG tablet 1/16/2019 at 0800 Other Yes Yes   Sig: Take 10 mg by mouth daily    sucralfate (CARAFATE) 1 GM tablet Unknown at prn Other Yes No   Sig: Take 1 g by mouth 4 times daily   traZODone (DESYREL) 100 MG tablet 1/15/2019 at 2000 Other No Yes   Sig:  Take 1 tablet (100 mg) by mouth At Bedtime Take 2 at bedtime   Patient taking differently: Take 100 mg by mouth At Bedtime    triamcinolone (KENALOG) 0.1 % cream Unknown at prn Other Yes No   Sig: Apply topically 3 times daily as needed    venlafaxine (EFFEXOR-ER) 150 MG TB24 1/16/2019 at 0800 Other Yes Yes   Sig: Take 150 mg by mouth 2 times daily    venlafaxine (EFFEXOR-XR) 37.5 MG 24 hr capsule 1/16/2019 at 0800 Other No Yes   Sig: Take 1 capsule (37.5 mg) by mouth daily (with lunch)   Patient taking differently: Take 37.5 mg by mouth daily       Facility-Administered Medications: None     Allergies   Allergies   Allergen Reactions     Strawberry Anaphylaxis       Physical Exam   Vital Signs: Temp: 97.8  F (36.6  C) Temp src: Temporal BP: 157/68   Heart Rate: 66 Resp: 16 SpO2: 95 % O2 Device: None (Room air)    Weight: 251 lbs 8.72 oz    GENERAL APPEARANCE: The patient is  well-developed, well-nourished ,  in no acute distress.  Does have slow speech  HEENT: Normocephalic and atraumatic. No scleral icterus.PERRLA. No conjunctival injection is noted. No oropharyngeal lesions.  NECK: Supple. Trachea is midline. No evidence of thyroid enlargement. No lymphadenopathy or tenderness.  CHEST: Symmetric. Nontender to palpation.  LUNGS: Breath sounds are equal and clear bilaterally. No wheezes, rhonchi, or rales.  HEART: Regular rate and rhythm with normal S1 and S2. No murmurs, gallops, or rubs.  ABDOMEN: Soft, flat, and benign. No mass, tenderness, guarding, or rebound. No organomegaly or hernia. Bowel sounds are present. No CVA tenderness or flank mass.  RECTAL: Deferred  EXTREMITIES: No cyanosis, clubbing, or edema.  NEUROLOGIC: No focal sensory or motor deficits are noted. Cranial nerves II through XII are intact. Grossly nonfocal examination  PSYCHIATRIC: Slightly depressed  SKIN: Warm, dry, and well perfused. Good turgor. No lesions, nodules or rashes are noted.   LYMPHATICS: No cervical, axillary, or groin  adenopathy is noted.    Data   Data reviewed today: I reviewed all medications, new labs and imaging results over the last 24 hours. I personally reviewed no images or EKG's today.    Recent Labs   Lab 01/16/19  1516   WBC 8.7   HGB 13.7   MCV 89         POTASSIUM 3.8   CHLORIDE 103   CO2 30   BUN 18   CR 0.68   ANIONGAP 5   KENDALL 9.3   GLC 91   ALBUMIN 3.8   PROTTOTAL 7.5   BILITOTAL 0.2   ALKPHOS 111   ALT 19   AST 26     Recent Results (from the past 24 hour(s))   CT Head w/o Contrast    Narrative    PROCEDURE: CT HEAD W/O CONTRAST   1/16/2019 3:32 PM    HISTORY:Female, age,  63 years, , , Headache, acute, severe, worst HA  of life    COMPARISON: Head CT 1/21/2017    TECHNIQUE: CT of the brain without contrast.    FINDINGS: Ventricles and sulci are normal in size and shape. Gray and  white matter demonstrate scattered areas of decreased density.    There is no evidence of mass, mass effect or midline shift. No  evidence of acute hemorrhage.    Bony structures are similar appearance with hyperostosis frontalis.       Impression    IMPRESSION:   No acute intracranial abnormality. No significant change compared to  1/21/2017.    Paranasal sinuses are normal.    DMITRIY MUNSON MD   MR Brain w/o & w Contrast    Narrative    MR BRAIN W/O & W CONTRAST    HISTORY: 63 years Female Focal neuro deficit, > 6 hrs, stroke  suspected; patient with left arm weakness, slowed speech    COMPARISON: CT dated 1/16/2019    TECHNIQUE: Routine multisequence and multiplanar MRI imaging of the  brain was performed with contrast.    FINDINGS    The cervical medullary junction is normal in position. There is no  evidence of a sellar or suprasellar mass.  No diffusion signal abnormalities are present suggest an acute or  subacute infarct.  Ventricles and sulci are symmetric without midline shift or mass  effect.  There is no evidence of intracranial mass or mass effect.  No abnormal  enhancement is present within the brain or  extra-axial spaces.      Impression    IMPRESSION: Negative study. There is no evidence of an acute or  subacute infarct. There is no evidence of intracranial mass.    GIGI MOYER MD

## 2019-01-17 NOTE — PLAN OF CARE
Patient awakens to name, alert & oriented, slow to respond. Complains of headache. Blood pressure elevated, orthostatic blood pressures done, physician updated. Blood pressure medication given.  Blood glucose running low both times checked today, gave orange juice & yogurt then she ordered breakfast & lunch.  gave Imitrex at 0920 with some relief. Norco 1 at 1210 with some relief.  Ambulates to bathroom with stand by assistance several time today.  Will continue to monitor, pleasant and cooperative with nursing assessment.        Face to face report given with opportunity to observe patient.    Report given to YAIR Huffman   1/17/2019  3:51 PM

## 2019-01-17 NOTE — PLAN OF CARE
"Discharge Planner OT   Patient plan for discharge: Pt was unsure, stating \"lots of times they send me to the nursing home\".   Current status: Pt completed ADLs and functional mobility with SBA to modified independence   Barriers to return to prior living situation: Cognition   Recommendations for discharge: Home with assistance from . Unsure what pt's baseline cognition is and if she is close to that or not.  was not in when OT evaluated. However, cognitive deficits did not affect her functioning/safety in ADLs. Higher level ADLs (IADLs) may be more problematic, however, this is typically not assessed in this setting. This is more of an outpatient issue.   Rationale for recommendations:Pt had no issues performing ADLs, including bed mobility, sit to/from stand transfers from the EOB, ambulation without assistive device to/from the bathroom, toilet transfer, and dressing tasks, as she completed all with SBA to (Mod) independently. Pt did exhibit cognitive deficits as noted by her score on the MOCA, 16/30 that placed her in the Alzheimer's disease category. Major deficit areas included language and delayed recall. She had moderate deficits with naming, attention, and orientation. Unknown what pt's baseline cognitive functioning is though as she has a h/o TBI and a concussion 3-4 years ago. Pt's cognitive deficits did not affect ADL functioning, though. Believe she would be safe to function/complete ADLs in her own environment living with her huband, however, higher level ADLs (IADLS) may be more challenging for pt, such as meal preparation, laundry, work, and medication management (as this is not thoroughly assessed in this setting). Recommend pt's home health nurse continues to provide education/instructions to assist with medication management and, if possible, pt's  assist with IADLs. If pt has issues once she returns home with IADLs, she may benefit from future outpatient therapy to evaluate " those areas.        Entered by: Desi Snowden 01/17/2019 3:58 PM

## 2019-01-17 NOTE — PLAN OF CARE
Pt to floor from ED this shift. Alert and oriented. Follows commands. Speech is slow at times but is logical and clear. Generalized weakness noted. Pt is due to void. Toradol administered for c/o headache. Sleeping upon recheck. Wakes easily. HS BG 72, snack given.     Face to face report given with opportunity to observe patient.    Report given to YAIR Rich   1/16/2019  11:07 PM

## 2019-01-17 NOTE — PROGRESS NOTES
Assessment completed see flow sheet.      LOC: alert    Others present: Patient    Dx: Bradykinesia/ confusion    Lives with: Lives With: spouse    Living at: Current Living Arrangements: home/apartment/condo    Equipment used: Equipment Currently Used at Home: cane, straight, walker, uses the cane when she's tired and the walker when her balance is not good.     Support System: Who is your support system?:  and friend Description of Support System: Supportive, Involved    Primary Care Provider: Nena Flanagan, Dr Bey podiatry, Dr Lomas ENT and Dr Frank urology.    POA/Guardian: No     Health Care Directive: YES , not on file here, will bring in    Pharmacy: [facility]    :  No    Homecare/[facility] Services:   [facility] for medication set up    Adequate Resources for needs (housing, utilities, food/med): YES    Meds and appointments management: YES    Work: YES    Transportation: YES     ADLs: dressing, sometimes needs help    Falls: No    Able to Return to Prior Living Arrangements: YES    : NO    Goals: get rid of headache    Barriers: none    Needs: Needs Instruction/Review    SILAS: none    Discharge Plan: home with already prearranged medication setup    LISSA lives at home with her . She states she feels safe and her home is well maintained. They have grab bars in the bathroom, railings on steps and smoke and CO detectors. [employer] does 99% of the shopping and LISSA cooks on weekends. She drives and has reliable transportation. 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 She sleeps well and has no mood concerns. She has never smoked and rarely consumes alcohol. Her piyush is important to her, she does not want piyush support at this time. Her support system is her  and friend Rosalina Magdalena. Stressors are financial. She enjoys watching women's softball and working in her shop. She plans to go home when discharged. CM will remain  available.

## 2019-01-17 NOTE — PROGRESS NOTES
[facility]    Hospitalist Progress Note    Date of Service (when I saw the patient): 01/17/2019    Assessment & Plan   Mary Jean Lesch is a 63 year old female who was admitted on 1/16/2019.     Bradykinesia with confusion: The patient had an MRI done in the ED that was negative for stroke.  She also has a history of previous such episodes.  In the past seizures were considered but she was never officially diagnosed with a seizure disorder.  She is also on multiple medications that could cause these symptoms including benzodiazepines, antidepressants and methylphenidate.  At this point she is improving.   She will definitely need to see a neurologist as an outpatient and EEG can be considered as well.  I will start her on low-dose aspirin and will get neuro checks overnight.  Will get PT/OT evaluation.  OT will do cognitive assessment as well. She also has multiple psych issues so somatization is also a possibility.     Migraine headaches: Since her early teen years although she states she has not had a migraine in the last 6 months. Will try Imitrex today.      Diabetes mellitus: Hold metformin and keep her on sliding scale insulin.  Check HbA1c.     Hypertension: The patient was rather hypertensive in the ED.  Improved this morning. Started on low dose lisinopril.     History of traumatic brain injury: Continue home medications for now including Ritalin, Effexor and clonazepam.  She was also on Depakote in the past. She does have known slow speech and has been evaluated and treated by speech therapy since her TBI.     Hypothyroidism: TSH was 2.2.  Continue on current dose of levothyroxine.      DVT Prophylaxis: Enoxaparin (Lovenox) SQ  Code Status: Full Code    Disposition: Expected discharge in 1-2 days once pain improved.    Scarlett Doe, CNP    Interval History   Feels no different than prior to admission. States headache still severe and gets worse with any activity, lights, sounds, Denies  nausea-hungry. No dyspnea or chest pain.     -Data reviewed today: I reviewed all new labs and imaging results over the last 24 hours.  Physical Exam   Temp: 98.2  F (36.8  C) Temp src: Tympanic BP: 146/60   Heart Rate: 67 Resp: 16 SpO2: 98 % O2 Device: None (Room air)    Vitals:    01/16/19 2019   Weight: 114.1 kg (251 lb 8.7 oz)     Vital Signs with Ranges  Temp:  [97.4  F (36.3  C)-98.5  F (36.9  C)] 98.2  F (36.8  C)  Heart Rate:  [63-76] 67  Resp:  [16] 16  BP: (146-188)/(60-86) 146/60  SpO2:  [95 %-99 %] 98 %  I/O last 3 completed shifts:  In: 1720 [P.O.:720; I.V.:1000]  Out: 400 [Urine:400]    Peripheral IV 01/16/19 Left (Active)   Site Assessment WDL 1/17/2019  3:40 PM   Line Status Saline locked 1/17/2019  3:40 PM   Phlebitis Scale 0-->no symptoms 1/17/2019  3:40 PM   Infiltration Scale 0 1/17/2019  3:40 PM   Number of days: 1     Line/device assessment(s) completed for medical necessity    Constitutional: Awake,alert, poor eye contact, flat affect  Respiratory: Lungs are clear bilaterally without wheezes,crackles or rhonchi  Cardiovascular: HRR, no murmurs,rubs,thrills. No peripheral edema.   GI: Obese, soft, nontender, bowel sounds positive.   Skin/Integumen: No unusual rashes, bruising or open areas.   Other:AOx3, responses are slow with slow speech patterns, bilateral pupils dilated but equal and reactive to light.      Medications       aspirin  81 mg Oral Daily     clonazePAM  0.25 mg Oral BID     enoxaparin  40 mg Subcutaneous Q24H     fluticasone  2 spray Both Nostrils Daily     gabapentin  300 mg Oral BID     insulin aspart  1-3 Units Subcutaneous TID AC     insulin aspart  1-3 Units Subcutaneous At Bedtime     levothyroxine  200 mcg Oral Daily     lisinopril  5 mg Oral Daily     meclizine  25 mg Oral TID     methylphenidate  20 mg Oral BID     montelukast  10 mg Oral At Bedtime     niacin ER  1,000 mg Oral At Bedtime     omeprazole  40 mg Oral Daily     ranitidine  150 mg Oral BID     sucralfate   1 g Oral 4x Daily     tolterodine ER  4 mg Oral Daily     traZODone  100 mg Oral At Bedtime     venlafaxine  150 mg Oral BID     venlafaxine  37.5 mg Oral Daily with lunch     vitamin C  500 mg Oral Daily       Data   Recent Labs   Lab 01/16/19  1516   WBC 8.7   HGB 13.7   MCV 89         POTASSIUM 3.8   CHLORIDE 103   CO2 30   BUN 18   CR 0.68   ANIONGAP 5   KENDALL 9.3   GLC 91   ALBUMIN 3.8   PROTTOTAL 7.5   BILITOTAL 0.2   ALKPHOS 111   ALT 19   AST 26       Recent Results (from the past 24 hour(s))   MR Brain w/o & w Contrast    Narrative    MR BRAIN W/O & W CONTRAST    HISTORY: 63 years Female Focal neuro deficit, > 6 hrs, stroke  suspected; patient with left arm weakness, slowed speech    COMPARISON: CT dated 1/16/2019    TECHNIQUE: Routine multisequence and multiplanar MRI imaging of the  brain was performed with contrast.    FINDINGS    The cervical medullary junction is normal in position. There is no  evidence of a sellar or suprasellar mass.  No diffusion signal abnormalities are present suggest an acute or  subacute infarct.  Ventricles and sulci are symmetric without midline shift or mass  effect.  There is no evidence of intracranial mass or mass effect.  No abnormal  enhancement is present within the brain or extra-axial spaces.      Impression    IMPRESSION: Negative study. There is no evidence of an acute or  subacute infarct. There is no evidence of intracranial mass.    GIGI MOYER MD

## 2019-01-18 VITALS
TEMPERATURE: 97.6 F | RESPIRATION RATE: 16 BRPM | HEIGHT: 71 IN | SYSTOLIC BLOOD PRESSURE: 172 MMHG | DIASTOLIC BLOOD PRESSURE: 77 MMHG | WEIGHT: 251.54 LBS | BODY MASS INDEX: 35.22 KG/M2 | OXYGEN SATURATION: 96 %

## 2019-01-18 LAB
BACTERIA SPEC CULT: ABNORMAL
BACTERIA SPEC CULT: ABNORMAL
GLUCOSE BLDC GLUCOMTR-MCNC: 76 MG/DL (ref 70–99)
GLUCOSE BLDC GLUCOMTR-MCNC: 90 MG/DL (ref 70–99)
GLUCOSE BLDC GLUCOMTR-MCNC: 99 MG/DL (ref 70–99)
SPECIMEN SOURCE: ABNORMAL

## 2019-01-18 PROCEDURE — G0378 HOSPITAL OBSERVATION PER HR: HCPCS

## 2019-01-18 PROCEDURE — 00000146 ZZHCL STATISTIC GLUCOSE BY METER IP

## 2019-01-18 PROCEDURE — 25000132 ZZH RX MED GY IP 250 OP 250 PS 637: Mod: GY | Performed by: HOSPITALIST

## 2019-01-18 PROCEDURE — A9270 NON-COVERED ITEM OR SERVICE: HCPCS | Mod: GY | Performed by: HOSPITALIST

## 2019-01-18 PROCEDURE — 99217 ZZC OBSERVATION CARE DISCHARGE: CPT | Performed by: INTERNAL MEDICINE

## 2019-01-18 RX ADMIN — HYDROCODONE BITARTRATE AND ACETAMINOPHEN 1 TABLET: 5; 325 TABLET ORAL at 10:39

## 2019-01-18 RX ADMIN — ASCORBIC ACID TAB 250 MG 500 MG: 250 TAB at 08:38

## 2019-01-18 RX ADMIN — LEVOTHYROXINE SODIUM 200 MCG: 100 TABLET ORAL at 08:39

## 2019-01-18 RX ADMIN — SUCRALFATE 1 G: 1 TABLET ORAL at 08:38

## 2019-01-18 RX ADMIN — MECLIZINE HCL 25 MG: 12.5 TABLET ORAL at 08:38

## 2019-01-18 RX ADMIN — VENLAFAXINE HYDROCHLORIDE 150 MG: 150 CAPSULE, EXTENDED RELEASE ORAL at 08:15

## 2019-01-18 RX ADMIN — LISINOPRIL 5 MG: 5 TABLET ORAL at 08:38

## 2019-01-18 RX ADMIN — HYDROCODONE BITARTRATE AND ACETAMINOPHEN 1 TABLET: 5; 325 TABLET ORAL at 00:40

## 2019-01-18 RX ADMIN — ASPIRIN 81 MG: 81 TABLET, COATED ORAL at 08:38

## 2019-01-18 RX ADMIN — OMEPRAZOLE 40 MG: 20 CAPSULE, DELAYED RELEASE ORAL at 08:38

## 2019-01-18 RX ADMIN — GABAPENTIN 300 MG: 300 CAPSULE ORAL at 08:39

## 2019-01-18 RX ADMIN — METHYLPHENIDATE HYDROCHLORIDE 20 MG: 10 TABLET ORAL at 08:39

## 2019-01-18 RX ADMIN — RANITIDINE 150 MG: 150 TABLET ORAL at 08:39

## 2019-01-18 RX ADMIN — SUCRALFATE 1 G: 1 TABLET ORAL at 12:46

## 2019-01-18 RX ADMIN — FLUTICASONE PROPIONATE 2 SPRAY: 50 SPRAY, METERED NASAL at 08:37

## 2019-01-18 RX ADMIN — TOLTERODINE 4 MG: 4 CAPSULE, EXTENDED RELEASE ORAL at 08:39

## 2019-01-18 RX ADMIN — VENLAFAXINE HYDROCHLORIDE 37.5 MG: 37.5 CAPSULE, EXTENDED RELEASE ORAL at 11:48

## 2019-01-18 RX ADMIN — CLONAZEPAM 0.25 MG: 0.25 TABLET, ORALLY DISINTEGRATING ORAL at 08:39

## 2019-01-18 NOTE — PROGRESS NOTES
Name: Mary Jean Lesch    MRN#: [id number]    Reason for Hospitalization: Bradykinesia [R25.8]    Discharge Date: 1/18/2019    Patient / Family response to discharge plan: in agreement    Follow-Up Appt: No future appointments.    Other Providers (Care Coordinator, County Services, PCA services etc): Yes: contacted [facility] to advise MJ was being discharged.    Discharge Disposition: home. With [facility] for Med set up only    Sandra Lopez

## 2019-01-18 NOTE — PLAN OF CARE
Temp: 97.6  F (36.4  C) Temp src: Temporal BP: 172/77   Heart Rate: 63 Resp: 16 SpO2: 96 % O2 Device: None (Room air)   Assessments as charted. Patient up in chair Ax1 with gait belt. Good appetite at breakfast. Denies needs at this time.

## 2019-01-18 NOTE — PLAN OF CARE
"/60   Temp 98.2  F (36.8  C) (Tympanic)   Resp 16   Ht 1.803 m (5' 11\")   Wt 114.1 kg (251 lb 8.7 oz)   SpO2 98%   BMI 35.08 kg/m      Alert, oriented, calm, cooperative. VSS, afebrile. Speech slightly slow but doesn't seem abnormal. O2 sats adequate on room air. Lungs clear, equal bilaterally. Denies shortness of breath. Apical pulse regular. IV saline locked. Bilateral feet with numbness/tingling chronically. Tolerates diet. BG low this shift. 64 at 1549, orange juice, pudding and popcicle given up to 110, then 98. 91 at bedtime, more juice and sandwich given prior to bed. Ambulates with SBA. Adequate voids. Calls appropriately. Will continue to monitor.    Face to face report given with opportunity to observe patient.    Report given to Kamryn Palmer   1/17/2019  11:18 PM      "

## 2019-01-18 NOTE — DISCHARGE INSTRUCTIONS
We have scheduled a follow up appointment with your PCP Krish Flanagan at the [facility] on  at 940 am. If you have any questions regarding this appointment or need to reschedule please call [phone].    MRI Contrast Discharge Instructions    The IV contrast you received today will pass out of your body in your  urine. This will happen in the next 24 hours. You will not feel this process.  Your urine will not change color.    Drink at least 4 extra glasses of water or juice today (unless your doctor  has restricted your fluids). This reduces the stress on your kidneys.  You may take your regular medicines.    If you are on dialysis: It is best to have dialysis today.    If you have a reaction: Most reactions happen right away. If you have  any new symptoms after leaving the hospital (such as hives or swelling),  call your hospital at the correct number below. Or call your family doctor.  If you have breathing distress or wheezing, call 911.    Special instructions: ***    I have read and understand the above information.    Signature:______________________________________ Date:____3-64-67_______    Staff:__________________________________________ Date:___________     Time:__________    [facility] Departments:    ___[place]: [phone]  ___[facility]: [phone]  ___[place]: [phone] ___[place]: [phone]  ___[facility]: [phone]  ___[facility]: [phone]  ___[facility] [phone]  ___[facility]: [phone]  ___[place]: [phone]

## 2019-01-18 NOTE — PLAN OF CARE
Pt A&Ox3. She reports headache/migraine pain, received norco one tablet with some relief. She reports some blurry vision which states is unchanged from admission. Afebrile. HR 70s and regular. /49. RR 16 and sating greater than 92% on room air. Lung sounds are clear. Bowel sounds are active. Up to bathroom with standby assist, slightly unsteady on feet occasionally. Blood glucose 90 and 99. IV saline lock. Call light within reach and pt calls appropriately.    Face to face report given with opportunity to observe patient.    Report given to YAIR Wallace   1/18/2019  7:30 AM

## 2019-01-18 NOTE — PLAN OF CARE
Patient discharged at 1:02 PM via wheel chair accompanied by spouse and staff. Prescriptions - None ordered for discharge. All belongings sent with patient.     Discharge instructions reviewed with patient. Listed belongings gathered and returned to patient. yes    Patient discharged to home.     Core Measures and Vaccines  Core Measures applicable during stay: No. If yes, state diagnosis:   Surgical Patient   Surgical Procedures during stay: no  Did patient receive discharge instruction on wound care and recognition of infection symptoms? N/A    MISC  Follow up appointment made:  Yes  Home and hospital aquired medications returned to patient: N/A  Patient reports pain was well managed at discharge: Yes

## 2019-01-19 ENCOUNTER — TELEPHONE (OUTPATIENT)
Dept: CASE MANAGEMENT | Facility: HOSPITAL | Age: 64
End: 2019-01-19

## 2019-01-19 NOTE — TELEPHONE ENCOUNTER
Mary Jean Lesch, was discharged to home on 1/18/19   from [facility]. I spoke today with her regarding her  discharge.   She indicates she has receive(d) sufficient information upon discharge. Medications were reviewed in full on discharge, including:  medications to be continued from preadmission and any side effects. Prescriptions  - None received at discharge.  Medications are being taken as prescribed.   She indicates she has  an appointment scheduled for Jan 21  and has transportation available.She was going to call the clinic Mon as she thought her appointment was at 1100. I informed her we have the appointment at 0940.    She did not have any questions regarding discharge instructions or condition.  Per their request, the following employee (s) can be recognized for their outstanding services delivered:  Nurses and aides were very attentive. Dr Anderson is wonderful. She did not care for Dr Girard.   Suggestions to improve service: Nothing indicated.   She was informed she  may receive a survey in the mail from the hospital. Asked if she  would kindly complete the survey in order for [facility] to know if services fully met patient needs.

## 2019-01-20 NOTE — DISCHARGE SUMMARY
Admit Date:     01/16/2019   Discharge Date:     01/18/2019      DISCHARGE DIAGNOSES:   1.  Bradykinesia.     2.  Migraine headache.   3.  Diabetes mellitus type 2.   4.  Hypertension.   5.  History of a traumatic brain injury.   6.  History of hypothyroidism.      PROCEDURES:  None.      COMPLICATIONS:  None.      HOSPITAL COURSE:  Marisol is a 63-year-old female who was brought to the ER by her friend because of concerns over how she was acting.  Apparently, she was just talking very slow.  She had difficulty in terms of reading things.  Felt maybe she was a little weaker on one side.  This all kind of started the day before when she developed a migraine headache.  Nonetheless, when she presented to the ER, most everything was nonfocal in terms of exam.  She was just answering questions very slowly.  Nonfocal neurological exam.  She underwent a CT scan followed by an MRI scan of her brain, which showed nothing acute at all.  Her lab work was unremarkable.  EKG was unremarkable.  She does use various medications including Klonopin, Ritalin, Neurontin, trazodone, Effexor and Zanaflex.  In the ER, a phone consult to Neurology, who really agreed with the workup, did not appear to be an obvious cerebrovascular episode.  Certainly felt it could be a combination of her medication use and/or migraine, etc.  They recommended observing her, which we did.  She had no real episodes of any neurological findings to speak of.        PHYSICAL EXAMINATION:     GENERAL:  Today, upon speaking with her, she is alert and oriented.  She relates everything that happened to her, although she herself was not clear as to what went on.  She is able to ambulate around the unit without difficulty.  Hemodynamically, she has been stable.     VITAL SIGNS:  Her blood pressures are 120-140, consistent with labile, over 50, temperature was 98 2, heart rates in the 70s.  Sats 95% on room air.     LUNGS:  Clear.     CARDIAC:  Regular rate and rhythm.      NEUROLOGIC:  She was nonfocal.        LABORATORY DATA:  Her blood sugars remained stable in the 90 range.  She does have a history of MRSA.  Her lab work in terms of renal function was all normal.  BUN and creatinine were 18 and 0.68.  A1c was 5.8%.  Lactic acid normal.  TSH was normal at 2.27.  White count was 8700, hemoglobin was 13.7.  Urinalysis was unremarkable.        So basically from a workup standpoint, everything was within normal limits.  We discussed this with the patient and she understands that.  I recommended that she probably see her primary care provider, which she has an appointment with Nena Flanagan coming up and will probably see one of the neurologists at some point in sorting this out.  She was in agreement with that current plan.      DISCHARGE MEDICATIONS:  She will be discharged home on the same medications as before, which are multiple and include:   1.  AmLactin cream b.i.d. for dry skin.   2.  Bisacodyl 5 mg daily p.r.n. constipation.   3.  Vitamin D3 1000 units daily.   4.  Clindamycin lotion b.i.d. p.r.n.   5.  Clonazepam 0.5 mg p.o. b.i.d.  Actually, she is taking 0.25 mg b.i.d., so half a tab.   6.  EpiPen p.r.n.   7.  Flonase p.r.n.   8.  Gabapentin 300 mg b.i.d.   9.  Hydrocodone/APAP 5/325 one to two every 4 hours p.r.n. pain.   10.  Lactulose p.r.n. constipation.   11.  Levothyroxine 200 mcg daily.   12.  Meclizine 25 mg t.i.d.   13.  Metformin 500 mg daily.  This is a XR.   14.  Ritalin 20 mg b.i.d.   15.  Singulair 10 mg at bedtime.   16.  Multivitamin daily.   17.  Niacin 500 mg daily.   18.  Omeprazole 40 mg daily.   19.  Ranitidine 150 mg b.i.d.   20.  Simvastatin 80 mg daily.   21.  Sucralfate 1 gram 4 times a day.     22.  Tizanidine 4 mg every 8 hours p.r.n. muscle spasm.   23.  Symbicort 2 puffs b.i.d. p.r.n.   24.  Trazodone 100-mg tablets 1-2 at bedtime p.r.n.   25.  Tylenol p.r.n.   26.  Effexor  mg b.i.d.   27.  VESIcare 10 mg daily.      ALLERGIES:   JUST TO STRAWBERRIES.       DIET:  Consistent carbohydrate diet.      ACTIVITY:  As tolerated.      FOLLOWUP:  She should have followup with Nena Flanagan within the next 7-10 days.        CODE STATUS:  She is FULL CODE.         SALVATORE NASCIMENTO MD             D: 2019   T: 2019   MT: SREEDHAR      Name:     LESCH, MARY JEAN   MRN:      [id number]        Account:        [id number]   :      1955           Admit Date:     2019                                  Discharge Date: 2019      Document: [id number]       cc: Nena Flanagan NP

## 2019-02-05 ENCOUNTER — TRANSFERRED RECORDS (OUTPATIENT)
Dept: HEALTH INFORMATION MANAGEMENT | Facility: CLINIC | Age: 64
End: 2019-02-05

## 2019-02-26 ENCOUNTER — OFFICE VISIT (OUTPATIENT)
Dept: SURGERY | Facility: OTHER | Age: 64
End: 2019-02-26
Attending: SURGERY
Payer: MEDICARE

## 2019-02-26 VITALS
WEIGHT: 255 LBS | DIASTOLIC BLOOD PRESSURE: 72 MMHG | HEART RATE: 75 BPM | OXYGEN SATURATION: 98 % | BODY MASS INDEX: 37.77 KG/M2 | HEIGHT: 69 IN | SYSTOLIC BLOOD PRESSURE: 110 MMHG | TEMPERATURE: 97.2 F

## 2019-02-26 DIAGNOSIS — Z12.11 SPECIAL SCREENING FOR MALIGNANT NEOPLASMS, COLON: Primary | ICD-10-CM

## 2019-02-26 DIAGNOSIS — Z01.818 PREOPERATIVE EXAMINATION: ICD-10-CM

## 2019-02-26 PROBLEM — Z87.2 HISTORY OF ACTINIC KERATOSES: Status: ACTIVE | Noted: 2018-03-02

## 2019-02-26 PROBLEM — Z79.899 POLYPHARMACY: Status: ACTIVE | Noted: 2017-01-20

## 2019-02-26 LAB
BASOPHILS # BLD AUTO: 0.1 10E9/L (ref 0–0.2)
BASOPHILS NFR BLD AUTO: 0.5 %
DIFFERENTIAL METHOD BLD: NORMAL
EOSINOPHIL # BLD AUTO: 0.3 10E9/L (ref 0–0.7)
EOSINOPHIL NFR BLD AUTO: 3.3 %
ERYTHROCYTE [DISTWIDTH] IN BLOOD BY AUTOMATED COUNT: 12.2 % (ref 10–15)
HCT VFR BLD AUTO: 39.8 % (ref 35–47)
HGB BLD-MCNC: 13.5 G/DL (ref 11.7–15.7)
IMM GRANULOCYTES # BLD: 0 10E9/L (ref 0–0.4)
IMM GRANULOCYTES NFR BLD: 0.3 %
LYMPHOCYTES # BLD AUTO: 2.4 10E9/L (ref 0.8–5.3)
LYMPHOCYTES NFR BLD AUTO: 26.4 %
MCH RBC QN AUTO: 29.8 PG (ref 26.5–33)
MCHC RBC AUTO-ENTMCNC: 33.9 G/DL (ref 31.5–36.5)
MCV RBC AUTO: 88 FL (ref 78–100)
MONOCYTES # BLD AUTO: 0.6 10E9/L (ref 0–1.3)
MONOCYTES NFR BLD AUTO: 7 %
NEUTROPHILS # BLD AUTO: 5.7 10E9/L (ref 1.6–8.3)
NEUTROPHILS NFR BLD AUTO: 62.5 %
NRBC # BLD AUTO: 0 10*3/UL
NRBC BLD AUTO-RTO: 0 /100
PLATELET # BLD AUTO: 190 10E9/L (ref 150–450)
RBC # BLD AUTO: 4.53 10E12/L (ref 3.8–5.2)
WBC # BLD AUTO: 9.1 10E9/L (ref 4–11)

## 2019-02-26 PROCEDURE — G0463 HOSPITAL OUTPT CLINIC VISIT: HCPCS

## 2019-02-26 PROCEDURE — 99203 OFFICE O/P NEW LOW 30 MIN: CPT | Performed by: SURGERY

## 2019-02-26 PROCEDURE — 36415 COLL VENOUS BLD VENIPUNCTURE: CPT | Mod: ZL | Performed by: SURGERY

## 2019-02-26 PROCEDURE — G0463 HOSPITAL OUTPT CLINIC VISIT: HCPCS | Mod: 25

## 2019-02-26 PROCEDURE — 93010 ELECTROCARDIOGRAM REPORT: CPT | Performed by: INTERNAL MEDICINE

## 2019-02-26 PROCEDURE — 85025 COMPLETE CBC W/AUTO DIFF WBC: CPT | Mod: ZL | Performed by: SURGERY

## 2019-02-26 PROCEDURE — 93005 ELECTROCARDIOGRAM TRACING: CPT

## 2019-02-26 RX ORDER — POLYETHYLENE GLYCOL 3350 17 G/17G
1 POWDER, FOR SOLUTION ORAL DAILY
COMMUNITY
End: 2019-02-26

## 2019-02-26 RX ORDER — CHLORAL HYDRATE 500 MG
1 CAPSULE ORAL 2 TIMES DAILY
COMMUNITY
End: 2019-02-26

## 2019-02-26 RX ORDER — UREA 40 %
CREAM (GRAM) TOPICAL
COMMUNITY
End: 2019-02-26

## 2019-02-26 RX ORDER — MULTIVIT-MIN/FA/LYCOPEN/LUTEIN .4-300-25
TABLET ORAL
COMMUNITY
End: 2019-02-26

## 2019-02-26 RX ORDER — VENLAFAXINE 37.5 MG/1
37.5 TABLET ORAL 2 TIMES DAILY
Status: ON HOLD | COMMUNITY
End: 2019-03-07

## 2019-02-26 ASSESSMENT — MIFFLIN-ST. JEOR: SCORE: 1766.05

## 2019-02-26 ASSESSMENT — PAIN SCALES - GENERAL: PAINLEVEL: MODERATE PAIN (4)

## 2019-02-26 NOTE — NURSING NOTE
"Chief Complaint   Patient presents with     Colonoscopy     referred by Nena Flanagan for constipation       Initial /72   Pulse 75   Temp 97.2  F (36.2  C)   Ht 1.753 m (5' 9\")   Wt 115.7 kg (255 lb)   SpO2 98%   BMI 37.66 kg/m   Estimated body mass index is 37.66 kg/m  as calculated from the following:    Height as of this encounter: 1.753 m (5' 9\").    Weight as of this encounter: 115.7 kg (255 lb).  Medication Reconciliation: complete    BRAYDEN DE LOS SANTOS LPN    "

## 2019-02-26 NOTE — PATIENT INSTRUCTIONS
Thank you for allowing Dr Spencer and our surgical team to participate in your care.  If you have a scheduling or an appointment question please contact Shruthi, our Health Unit Coordinator, at her direct line [phone].   ALL nursing questions or concerns can be directed to your surgical nurse at: [phone]     You are scheduled for a: Colonoscopy with possible biopsy   Your procedure date is:  Thursday, March 7, 2019       Admit Time: The surgery department will call you the afternoon prior to your procedure, by 5 pm, with your arrival time. If your surgery is on Monday, expect a call on Friday. If we were unable to reach you before 5PM, you may call admitting at [phone].    You have been ordered Miralax, Dulcolax and Magnesium Citrate as your mechanical bowel prep. Please pick this up from your preferred pharmacy. These medications are over the counter.    Two (2) Dulcolax (bisacodyl) tablets  One 8.3 ounce bottle of miralax  One 10-ounce bottle magnesium citrate  One 64 ounce bottle of Gatorade (Not powdered and no red or purple)    One day before the exam:    Begin drinking clear liquids.  Drink at least 8 to 10 glasses of clear liquids during the day.  At 12pm noon, take two (2) Dulcolax tablets.  At 6pm mix the bottle of miralax and 64 ounces of Gatorade in a pitcher.  Drink one 8 ounce glass every 15 minutes until gone.    Stay near a toilet while using this medicine.  Besides diarrhea (watery stools) you may have mild cramping, bloating and nausea.      Clear liquids include:  Water, tea, coffee (no cream), soda pop, gatorade (no red or purple), clear nutrition drinks, Jell-O, popsicles (no milk or fruit pieces), or sorbet (not red or purple), fat free soup broth or bouillon, plain hard candy, such as clear life savers, clear juices and fruit flavored drinks such as apple juice, white grape juice, Hi-C and Haroldo-Aid (not red or purple).      Day of the exam:    6 hours before your exam:   Drink the bottle of magnesium citrate.  Right away after drinking, drink a full glass of water.  You may drink clear liquids until 3 hours before your exam.  If you must take medicine, you may take it with sips of water.  Do not take diabetes medicine by mouth until after your exam.  If you have asthma, bring your inhaler with you  Please arrive with an adult that can take you home after the test.  The medicine used will make you sleepy.  If you do not have someone to take you home, we may cancel your test.     HOW TO PREPARE-        You need a friend or family member available to drive you home AND stay with you for 4 hours after you leave the hospital. You will not be allowed to drive yourself. IF you need to take a taxi or the bus you MUST have a responsible person to ride with you. YOUR PROCEDURE WILL BE CANCELLED IF YOU DO NOT HAVE A RESPONSIBLE ADULT TO DRIVE YOU HOME.       You need to call our Surgery Education Nurses 1-2 weeks prior to your surgery date at [phone] or toll free [phone]. Please have you medication and allergy lists ready.       Stop your aspirin or other NSAIDs(Ibuprofen, Motrin, Aleve, Celebrex, Naproxen, etc...) 7 days before your surgery.  Tylenol is safe to take.        Same day surgery will call you the day before your surgery with your arrival time. If you are scheduled on a Monday same day surgery will call you the Friday before.      Please call your primary care physician if you should become ill within 24 hours of scheduled surgery. (ex.vomiting, diarrhea, fever)

## 2019-02-27 NOTE — PROGRESS NOTES
Surgery Consult Clinic Note      RE: Mary Jean Lesch  : 1955  ILDEFONSO: 2019      Chief Complaint:  Colon Cancer Screening   Change in Stool Caliber     History of Present Illness:  Mary Jean Lesch is a very pleasant 63 year old year old female who I am seeing at the request of Nena Flanagan for evaluation of screening colon malignant neoplasm and consideration for colonoscopy.  She denies family history of colon or rectal cancer, blood in stool, weight loss, abdominal pain.  Surgical hx: hysterectomy, appendectomy, cholecystectomy. She admits to chronic constipation as well as a pain and fullness in her rectal region. She has to strain quite hard on the toilet, she denies ever having to reduce any tissue. She has history of adenomatous polyps.   She specifically denies fever, chills, nausea, vomiting, chest pain, shortness of breath or palpitations.      Medical history:  Past Medical History:   Diagnosis Date     Presence of both artificial knee joints     6/10/2016       Surgical history:  Past Surgical History:   Procedure Laterality Date     APPENDECTOMY       ARTHROPLASTY KNEE BILATERAL       CA ANESTH,SHOULDER REPLACEMENT Left      CHOLECYSTECTOMY       COLONOSCOPY       COLONOSCOPY - HIM SCAN  10/07/2013    Colonoscopy with internal hemorrhoidectomy with Dr. Adela Marshall at [facility] [place] - 10 year repeat recommended  10/2013     HYSTERECTOMY       REPAIR TENDON ACHILLES      x4       Family history:  No family history on file.    Medications:  Current Outpatient Medications   Medication Sig Dispense Refill     Acetaminophen (TYLENOL PO) Take 500 mg by mouth every 6 hours as needed for mild pain        ammonium lactate (AMLACTIN) 12 % cream Apply topically 2 times daily as needed for dry skin        ascorbic acid (VITAMIN C) 500 MG tablet Take 500 mg by mouth daily        Budesonide-Formoterol Fumarate (SYMBICORT IN) Inhale 2 puffs into the lungs daily as needed        cholecalciferol (VITAMIN  -D)  "1000 UNITS capsule Take 1 capsule by mouth daily       clonazePAM (KLONOPIN) 0.5 MG tablet Take 0.25 mg by mouth 2 times daily        levothyroxine (SYNTHROID/LEVOTHROID) 200 MCG tablet Take 1 tablet by mouth daily       linaclotide (LINZESS) 145 MCG capsule Take 145 mcg by mouth every morning (before breakfast)       meclizine (ANTIVERT) 25 MG tablet Take 1 tablet by mouth 3 times daily        metFORMIN (GLUCOPHAGE-XR) 500 MG 24 hr tablet Take 1 tablet by mouth daily       methylphenidate (RITALIN) 20 MG tablet Take 30 mg by mouth 2 times daily        omeprazole (PRILOSEC) 40 MG capsule Take 40 mg by mouth daily        RANITIDINE HCL PO Take 150 mg by mouth 2 times daily        simvastatin (ZOCOR) 80 MG tablet Take 80 mg by mouth At Bedtime        solifenacin (VESICARE) 10 MG tablet Take 10 mg by mouth daily        traZODone (DESYREL) 100 MG tablet Take 1 tablet (100 mg) by mouth At Bedtime Take 2 at bedtime (Patient taking differently: Take 100 mg by mouth At Bedtime ) 90 tablet      venlafaxine (EFFEXOR) 37.5 MG tablet Take 37.5 mg by mouth 2 times daily       venlafaxine (EFFEXOR-XR) 37.5 MG 24 hr capsule Take 1 capsule (37.5 mg) by mouth daily (with lunch) (Patient taking differently: Take 150 mg by mouth daily ) 30 capsule      Allergies:  The patientis allergic to strawberry and no clinical screening - see comments.  .  Social history:  Social History     Tobacco Use     Smoking status: Never Smoker     Smokeless tobacco: Never Used   Substance Use Topics     Alcohol use: Not on file     Marital status: .      Review of Systems:  10 point review of systems was obtained and negative other than what previously mentioned in HPI    Physical Examination:  /72   Pulse 75   Temp 97.2  F (36.2  C)   Ht 1.753 m (5' 9\")   Wt 115.7 kg (255 lb)   SpO2 98%   BMI 37.66 kg/m    General: AAOx4, NAD, WN/WD, ambulating without assistance  HEENT:NCAT, EOMI, PERRL Sclerae anicteric; Trachea mideline,   Chest:  " No acute distress, CTAB   Cardiac:  regular rate and rhythm, no murmur   Abdomen: non-distended   Extremities: Cursory exam unremarkable.  Skin: Warm, dry,   Neuro: no focal deficit,   Psych: happy, calm, asks appropriate questions      Assessment/Plan:  63 y.o. Female with history of chronic constipation and adenomatous polyps. Will plan on colonoscopy to try and rule out any structural reason. Satisfactory candidate for colonoscopy.  The indications, risks, benefits and technical aspects of whole colon colonoscopy were outlined with risks including, but not limited to, perforation, bleeding and inability to visualize entire colon.  Management of each was reviewed.  The need of mechanical preparation of the colon was reviewed along with the use of monitored anesthetic care.  The patient's questions were asked and answered.      Thor Spencer

## 2019-03-06 ENCOUNTER — ANESTHESIA EVENT (OUTPATIENT)
Dept: SURGERY | Facility: HOSPITAL | Age: 64
End: 2019-03-06
Payer: MEDICARE

## 2019-03-06 RX ORDER — LIDOCAINE 40 MG/G
CREAM TOPICAL
Status: CANCELLED | OUTPATIENT
Start: 2019-03-06

## 2019-03-06 RX ORDER — LABETALOL HYDROCHLORIDE 5 MG/ML
10 INJECTION, SOLUTION INTRAVENOUS
Status: CANCELLED | OUTPATIENT
Start: 2019-03-06

## 2019-03-06 RX ORDER — MEPERIDINE HYDROCHLORIDE 50 MG/ML
12.5 INJECTION INTRAMUSCULAR; INTRAVENOUS; SUBCUTANEOUS
Status: CANCELLED | OUTPATIENT
Start: 2019-03-06

## 2019-03-06 RX ORDER — FENTANYL CITRATE 50 UG/ML
25-50 INJECTION, SOLUTION INTRAMUSCULAR; INTRAVENOUS
Status: CANCELLED | OUTPATIENT
Start: 2019-03-06

## 2019-03-06 RX ORDER — SODIUM CHLORIDE, SODIUM LACTATE, POTASSIUM CHLORIDE, CALCIUM CHLORIDE 600; 310; 30; 20 MG/100ML; MG/100ML; MG/100ML; MG/100ML
INJECTION, SOLUTION INTRAVENOUS CONTINUOUS
Status: CANCELLED | OUTPATIENT
Start: 2019-03-06

## 2019-03-06 RX ORDER — ONDANSETRON 4 MG/1
4 TABLET, ORALLY DISINTEGRATING ORAL EVERY 30 MIN PRN
Status: CANCELLED | OUTPATIENT
Start: 2019-03-06

## 2019-03-06 RX ORDER — HYDROMORPHONE HYDROCHLORIDE 1 MG/ML
.3-.5 INJECTION, SOLUTION INTRAMUSCULAR; INTRAVENOUS; SUBCUTANEOUS EVERY 10 MIN PRN
Status: CANCELLED | OUTPATIENT
Start: 2019-03-06

## 2019-03-06 RX ORDER — ONDANSETRON 2 MG/ML
4 INJECTION INTRAMUSCULAR; INTRAVENOUS EVERY 30 MIN PRN
Status: CANCELLED | OUTPATIENT
Start: 2019-03-06

## 2019-03-06 RX ORDER — ALBUTEROL SULFATE 0.83 MG/ML
2.5 SOLUTION RESPIRATORY (INHALATION) EVERY 4 HOURS PRN
Status: CANCELLED | OUTPATIENT
Start: 2019-03-06

## 2019-03-06 RX ORDER — NALOXONE HYDROCHLORIDE 0.4 MG/ML
.1-.4 INJECTION, SOLUTION INTRAMUSCULAR; INTRAVENOUS; SUBCUTANEOUS
Status: CANCELLED | OUTPATIENT
Start: 2019-03-06 | End: 2019-03-07

## 2019-03-06 ASSESSMENT — COPD QUESTIONNAIRES: COPD: 1

## 2019-03-06 NOTE — ANESTHESIA PREPROCEDURE EVALUATION
Anesthesia Pre-Procedure Evaluation    Patient: Mary Jean Lesch   MRN: [id number] : 1955          Preoperative Diagnosis: HISTORY OF POLYPS, STOOL CALIBER    Procedure(s):  DIAGNOSTIC COLONOSCOPY POSSIBLE BIOPSY, POSSIBLE POLYPECTOMY    Past Medical History:   Diagnosis Date     Presence of both artificial knee joints     6/10/2016     Past Surgical History:   Procedure Laterality Date     APPENDECTOMY       ARTHROPLASTY KNEE BILATERAL       CA ANESTH,SHOULDER REPLACEMENT Left      CHOLECYSTECTOMY       COLONOSCOPY       COLONOSCOPY - HIM SCAN  10/07/2013    Colonoscopy with internal hemorrhoidectomy with Dr. Adela Marshall at [facility] [place] - 10 year repeat recommended  10/2013     HYSTERECTOMY       REPAIR TENDON ACHILLES      x4       Anesthesia Evaluation     . Pt has had prior anesthetic.     No history of anesthetic complications          ROS/MED HX    ENT/Pulmonary:     (+)sleep apnea, DIMITRY risk factors (BMI: 37.74) hypertension, obese, other ENT- s/p UPPP, COPD, doesn't use CPAP , . .    Neurologic:     (+)other neuro TBI, BPPV    Cardiovascular:     (+) Dyslipidemia, hypertension----. : . . . :. . Previous cardiac testing date:results:date: results:ECG reviewed date:19 results:Nonspecific T wave abnormality date: results:          METS/Exercise Tolerance:     Hematologic:         Musculoskeletal:   (+) arthritis, , , other musculoskeletal- DDD, lumbosacral spondylosis      GI/Hepatic:     (+) GERD bowel prep,       Renal/Genitourinary:         Endo:     (+) type II DM thyroid problem Obesity, .      Psychiatric:     (+) psychiatric history anxiety, bipolar, depression and other (comment) (PTSD)      Infectious Disease:   (+) MRSA,       Malignancy:         Other:                          Physical Exam  Normal systems: cardiovascular and pulmonary    Airway   Mallampati: II  TM distance: >3 FB  Neck ROM: full    Dental   (+) lower dentures, partials and missing    Cardiovascular   Rhythm and rate: regular  and normal      Pulmonary    breath sounds clear to auscultation            Lab Results   Component Value Date    WBC 9.1 02/26/2019    HGB 13.5 02/26/2019    HCT 39.8 02/26/2019     02/26/2019    CRP 5.0 (H) 05/28/2014     01/16/2019    POTASSIUM 3.8 01/16/2019    CHLORIDE 103 01/16/2019    CO2 30 01/16/2019    BUN 18 01/16/2019    CR 0.68 01/16/2019    GLC 91 01/16/2019    KENDALL 9.3 01/16/2019    MAG 1.7 (L) 05/28/2014    ALBUMIN 3.8 01/16/2019    PROTTOTAL 7.5 01/16/2019    ALT 19 01/16/2019    AST 26 01/16/2019    ALKPHOS 111 01/16/2019    BILITOTAL 0.2 01/16/2019    PTT 30 05/28/2014    INR 1.02 05/28/2014    TSH 2.27 01/16/2019    TROPN  05/29/2014     <0.04  **Risk Stratification**   0.10 - 0.39   Elevated-may indicate increased                 risk of short term adverse                 cardiac events.      >=0.4      Possible cardiac injury.      TROPN  05/29/2014     <0.04  **Risk Stratification**   0.10 - 0.39   Elevated-may indicate increased                 risk of short term adverse                 cardiac events.      >=0.4      Possible cardiac injury.      TROPN  05/28/2014     <0.04  **Risk Stratification**   0.10 - 0.39   Elevated-may indicate increased                 risk of short term adverse                 cardiac events.      >=0.4      Possible cardiac injury.      TROPN 0.05 12/11/2013    TROPN  12/10/2013     <0.04  **Risk Stratification**   0.10 - 0.39   Elevated-may indicate increased                 risk of short term adverse                 cardiac events.      >=0.4      Possible cardiac injury.       Preop Vitals  BP Readings from Last 3 Encounters:   02/26/19 110/72   01/18/19 172/77   01/21/17 178/86    Pulse Readings from Last 3 Encounters:   02/26/19 75   01/21/17 73   06/13/16 84      Resp Readings from Last 3 Encounters:   01/18/19 16   01/21/17 16   06/13/16 16    SpO2 Readings from Last 3 Encounters:   02/26/19 98%   01/18/19 96%   01/21/17 98%      Temp Readings  "from Last 1 Encounters:   02/26/19 97.2  F (36.2  C)    Ht Readings from Last 1 Encounters:   02/26/19 1.753 m (5' 9\")      Wt Readings from Last 1 Encounters:   02/26/19 115.7 kg (255 lb)    Estimated body mass index is 37.66 kg/m  as calculated from the following:    Height as of 2/26/19: 1.753 m (5' 9\").    Weight as of 2/26/19: 115.7 kg (255 lb).       Anesthesia Plan      History & Physical Review  History and physical reviewed and following examination; no interval change.    ASA Status:  3 .    NPO Status:  > 8 hours    Plan for MAC with Intravenous and Propofol induction. Maintenance will be TIVA.  Reason for MAC:  Chronic cardiopulmonary disease (G9) and Other - see comments  PONV prophylaxis:  Ondansetron (or other 5HT-3)  Surgeon requests deep sedation. Patient is an ASA 3. Will provide MAC.      Postoperative Care  Postoperative pain management:  IV analgesics.      Consents  Anesthetic plan, risks, benefits and alternatives discussed with:  Patient..                 GUZMAN White CRNA  "

## 2019-03-06 NOTE — ANESTHESIA PREPROCEDURE EVALUATION
Anesthesia Pre-Procedure Evaluation    Patient: Mary Jean Lesch   MRN: [id number] : 1955          Preoperative Diagnosis: HISTORY OF POLYPS, STOOL CALIBER    Procedure(s):  DIAGNOSTIC COLONOSCOPY POSSIBLE BIOPSY, POSSIBLE POLYPECTOMY    Past Medical History:   Diagnosis Date     Presence of both artificial knee joints     6/10/2016     Past Surgical History:   Procedure Laterality Date     APPENDECTOMY       ARTHROPLASTY KNEE BILATERAL       CA ANESTH,SHOULDER REPLACEMENT Left      CHOLECYSTECTOMY       COLONOSCOPY       COLONOSCOPY - HIM SCAN  10/07/2013    Colonoscopy with internal hemorrhoidectomy with Dr. Adela Marshall [place] [facility] [place] [facility] [place]  10/2013     HYSTERECTOMY       REPAIR TENDON ACHILLES      x4       Anesthesia Evaluation     .             ROS/MED HX    ENT/Pulmonary:     (+)sleep apnea, COPD, , . .    Neurologic:     (+)other neuro TBI, BPPV    Cardiovascular:     (+) Dyslipidemia, hypertension----. : . . . :. . Previous cardiac testing date:results:date: results:ECG reviewed date:19 results:NSR nonspecific T wave abnormality date: results:          METS/Exercise Tolerance:     Hematologic:         Musculoskeletal:   (+) arthritis, , , other musculoskeletal- lumbosacral spondylosis      GI/Hepatic:     (+) GERD       Renal/Genitourinary:         Endo:     (+) type II DM thyroid problem Obesity, .      Psychiatric:     (+) psychiatric history anxiety, depression, other (comment) and bipolar (PTSD)      Infectious Disease:         Malignancy:         Other:                                 Lab Results   Component Value Date    WBC 9.1 2019    HGB 13.5 2019    HCT 39.8 2019     2019    CRP 5.0 (H) 2014     2019    POTASSIUM 3.8 2019    CHLORIDE 103 2019    CO2 30 2019    BUN 18 2019    CR 0.68 2019    GLC 91 2019    KENDALL 9.3 2019    MAG 1.7 (L) 2014    ALBUMIN 3.8 2019     "PROTTOTAL 7.5 01/16/2019    ALT 19 01/16/2019    AST 26 01/16/2019    ALKPHOS 111 01/16/2019    BILITOTAL 0.2 01/16/2019    PTT 30 05/28/2014    INR 1.02 05/28/2014    TSH 2.27 01/16/2019    TROPN  05/29/2014     <0.04  **Risk Stratification**   0.10 - 0.39   Elevated-may indicate increased                 risk of short term adverse                 cardiac events.      >=0.4      Possible cardiac injury.      TROPN  05/29/2014     <0.04  **Risk Stratification**   0.10 - 0.39   Elevated-may indicate increased                 risk of short term adverse                 cardiac events.      >=0.4      Possible cardiac injury.      TROPN  05/28/2014     <0.04  **Risk Stratification**   0.10 - 0.39   Elevated-may indicate increased                 risk of short term adverse                 cardiac events.      >=0.4      Possible cardiac injury.      TROPN 0.05 12/11/2013    TROPN  12/10/2013     <0.04  **Risk Stratification**   0.10 - 0.39   Elevated-may indicate increased                 risk of short term adverse                 cardiac events.      >=0.4      Possible cardiac injury.       Preop Vitals  BP Readings from Last 3 Encounters:   02/26/19 110/72   01/18/19 172/77   01/21/17 178/86    Pulse Readings from Last 3 Encounters:   02/26/19 75   01/21/17 73   06/13/16 84      Resp Readings from Last 3 Encounters:   01/18/19 16   01/21/17 16   06/13/16 16    SpO2 Readings from Last 3 Encounters:   02/26/19 98%   01/18/19 96%   01/21/17 98%      Temp Readings from Last 1 Encounters:   02/26/19 97.2  F (36.2  C)    Ht Readings from Last 1 Encounters:   02/26/19 1.753 m (5' 9\")      Wt Readings from Last 1 Encounters:   02/26/19 115.7 kg (255 lb)    Estimated body mass index is 37.66 kg/m  as calculated from the following:    Height as of 2/26/19: 1.753 m (5' 9\").    Weight as of 2/26/19: 115.7 kg (255 lb).                   GUZMAN White CRNA  "

## 2019-03-07 ENCOUNTER — ANESTHESIA (OUTPATIENT)
Dept: SURGERY | Facility: HOSPITAL | Age: 64
End: 2019-03-07
Payer: MEDICARE

## 2019-03-07 ENCOUNTER — HOSPITAL ENCOUNTER (OUTPATIENT)
Facility: HOSPITAL | Age: 64
Discharge: HOME | End: 2019-03-07
Attending: SURGERY | Admitting: SURGERY
Payer: MEDICARE

## 2019-03-07 VITALS
SYSTOLIC BLOOD PRESSURE: 140 MMHG | HEIGHT: 71 IN | OXYGEN SATURATION: 98 % | BODY MASS INDEX: 34.3 KG/M2 | RESPIRATION RATE: 20 BRPM | TEMPERATURE: 97 F | WEIGHT: 245 LBS | DIASTOLIC BLOOD PRESSURE: 104 MMHG

## 2019-03-07 PROCEDURE — 25000125 ZZHC RX 250: Performed by: NURSE ANESTHETIST, CERTIFIED REGISTERED

## 2019-03-07 PROCEDURE — 37000008 ZZH ANESTHESIA TECHNICAL FEE, 1ST 30 MIN: Performed by: SURGERY

## 2019-03-07 PROCEDURE — 37000009 ZZH ANESTHESIA TECHNICAL FEE, EACH ADDTL 15 MIN: Performed by: SURGERY

## 2019-03-07 PROCEDURE — 71000027 ZZH RECOVERY PHASE 2 EACH 15 MINS: Performed by: SURGERY

## 2019-03-07 PROCEDURE — 27210794 ZZH OR GENERAL SUPPLY STERILE: Performed by: SURGERY

## 2019-03-07 PROCEDURE — 25000128 H RX IP 250 OP 636: Performed by: NURSE ANESTHETIST, CERTIFIED REGISTERED

## 2019-03-07 PROCEDURE — 88305 TISSUE EXAM BY PATHOLOGIST: CPT | Mod: TC | Performed by: SURGERY

## 2019-03-07 PROCEDURE — 45380 COLONOSCOPY AND BIOPSY: CPT | Performed by: SURGERY

## 2019-03-07 PROCEDURE — 36000052 ZZH SURGERY LEVEL 2 EA 15 ADDTL MIN: Performed by: SURGERY

## 2019-03-07 PROCEDURE — 36000050 ZZH SURGERY LEVEL 2 1ST 30 MIN: Performed by: SURGERY

## 2019-03-07 PROCEDURE — 40000305 ZZH STATISTIC PRE PROC ASSESS I: Performed by: SURGERY

## 2019-03-07 PROCEDURE — 45378 DIAGNOSTIC COLONOSCOPY: CPT | Performed by: NURSE ANESTHETIST, CERTIFIED REGISTERED

## 2019-03-07 PROCEDURE — 45378 DIAGNOSTIC COLONOSCOPY: CPT | Performed by: ANESTHESIOLOGY

## 2019-03-07 PROCEDURE — 25800030 ZZH RX IP 258 OP 636: Performed by: NURSE ANESTHETIST, CERTIFIED REGISTERED

## 2019-03-07 RX ORDER — LIDOCAINE HYDROCHLORIDE 20 MG/ML
INJECTION, SOLUTION INFILTRATION; PERINEURAL PRN
Status: DISCONTINUED | OUTPATIENT
Start: 2019-03-07 | End: 2019-03-07

## 2019-03-07 RX ORDER — SODIUM CHLORIDE, SODIUM LACTATE, POTASSIUM CHLORIDE, CALCIUM CHLORIDE 600; 310; 30; 20 MG/100ML; MG/100ML; MG/100ML; MG/100ML
INJECTION, SOLUTION INTRAVENOUS CONTINUOUS
Status: DISCONTINUED | OUTPATIENT
Start: 2019-03-07 | End: 2019-03-07 | Stop reason: HOSPADM

## 2019-03-07 RX ORDER — VENLAFAXINE 100 MG/1
237.5 TABLET ORAL DAILY
COMMUNITY
End: 2019-12-05

## 2019-03-07 RX ORDER — PROPOFOL 10 MG/ML
INJECTION, EMULSION INTRAVENOUS PRN
Status: DISCONTINUED | OUTPATIENT
Start: 2019-03-07 | End: 2019-03-07

## 2019-03-07 RX ORDER — LIDOCAINE 40 MG/G
CREAM TOPICAL
Status: DISCONTINUED | OUTPATIENT
Start: 2019-03-07 | End: 2019-03-07 | Stop reason: HOSPADM

## 2019-03-07 RX ORDER — LIDOCAINE 40 MG/G
CREAM TOPICAL
Status: DISCONTINUED | OUTPATIENT
Start: 2019-03-07 | End: 2019-03-07

## 2019-03-07 RX ADMIN — PROPOFOL 20 MG: 10 INJECTION, EMULSION INTRAVENOUS at 09:58

## 2019-03-07 RX ADMIN — PROPOFOL 30 MG: 10 INJECTION, EMULSION INTRAVENOUS at 10:04

## 2019-03-07 RX ADMIN — PROPOFOL 50 MG: 10 INJECTION, EMULSION INTRAVENOUS at 09:41

## 2019-03-07 RX ADMIN — PROPOFOL 50 MG: 10 INJECTION, EMULSION INTRAVENOUS at 09:38

## 2019-03-07 RX ADMIN — LIDOCAINE HYDROCHLORIDE 40 MG: 20 INJECTION, SOLUTION INFILTRATION; PERINEURAL at 09:38

## 2019-03-07 RX ADMIN — PROPOFOL 50 MG: 10 INJECTION, EMULSION INTRAVENOUS at 09:46

## 2019-03-07 RX ADMIN — PROPOFOL 30 MG: 10 INJECTION, EMULSION INTRAVENOUS at 09:53

## 2019-03-07 RX ADMIN — SODIUM CHLORIDE, POTASSIUM CHLORIDE, SODIUM LACTATE AND CALCIUM CHLORIDE: 600; 310; 30; 20 INJECTION, SOLUTION INTRAVENOUS at 09:34

## 2019-03-07 RX ADMIN — MIDAZOLAM 2 MG: 1 INJECTION INTRAMUSCULAR; INTRAVENOUS at 09:38

## 2019-03-07 ASSESSMENT — MIFFLIN-ST. JEOR: SCORE: 1752.44

## 2019-03-07 NOTE — ANESTHESIA POSTPROCEDURE EVALUATION
Patient: Mary Jean Lesch    Procedure(s):  DIAGNOSTIC COLONOSCOPY    Diagnosis:HISTORY OF POLYPS, STOOL CALIBER  Diagnosis Additional Information: No value filed.    Anesthesia Type:  MAC    Note:  Anesthesia Post Evaluation    Patient location during evaluation: Phase 2 and Bedside  Patient participation: Able to fully participate in evaluation  Level of consciousness: awake and alert  Pain management: adequate  Airway patency: patent  Cardiovascular status: acceptable  Respiratory status: acceptable  Hydration status: stable  PONV: none     Anesthetic complications: None          Last vitals:  Vitals:    03/07/19 1015 03/07/19 1030   BP: 140/78 (!) 140/104   Resp: 20 20   SpO2:  98%         Electronically Signed By: Matias Barron MD  March 7, 2019  10:37 AM

## 2019-03-07 NOTE — ANESTHESIA CARE TRANSFER NOTE
Patient: Mary Jean Lesch    Procedure(s):  DIAGNOSTIC COLONOSCOPY    Diagnosis: HISTORY OF POLYPS, STOOL CALIBER  Diagnosis Additional Information: No value filed.    Anesthesia Type:   MAC     Note:  Airway :Nasal Cannula  Patient transferred to:Phase II  Handoff Report: Identifed the Patient, Identified the Reponsible Provider, Reviewed the pertinent medical history, Discussed the surgical course, Reviewed Intra-OP anesthesia mangement and issues during anesthesia, Set expectations for post-procedure period and Allowed opportunity for questions and acknowledgement of understanding      Vitals: (Last set prior to Anesthesia Care Transfer)    CRNA VITALS  3/7/2019 0943 - 3/7/2019 1020      3/7/2019             Pulse:  70    Ht Rate:  68    SpO2:  94 %    Resp Rate (set):  8                Electronically Signed By: GUZMAN De La Garza CRNA  March 7, 2019  10:20 AM

## 2019-03-07 NOTE — ANESTHESIA CARE TRANSFER NOTE
Patient: Mary Jean Lesch    Procedure(s):  DIAGNOSTIC COLONOSCOPY    Diagnosis: HISTORY OF POLYPS, STOOL CALIBER  Diagnosis Additional Information: No value filed.    Anesthesia Type:   MAC     Note:  Airway :Nasal Cannula  Patient transferred to:Phase II  Handoff Report: Identifed the Patient, Identified the Reponsible Provider, Reviewed the pertinent medical history, Discussed the surgical course, Reviewed Intra-OP anesthesia mangement and issues during anesthesia, Set expectations for post-procedure period and Allowed opportunity for questions and acknowledgement of understanding      Vitals: (Last set prior to Anesthesia Care Transfer)    CRNA VITALS  3/7/2019 0943 - 3/7/2019 1015      3/7/2019             Pulse:  70    Ht Rate:  68    SpO2:  94 %    Resp Rate (set):  8                Electronically Signed By: GUZMAN De La Garza CRNA  March 7, 2019  10:15 AM

## 2019-03-07 NOTE — BRIEF OP NOTE
[facility]    Brief Operative Note    Pre-operative diagnosis: HISTORY OF POLYPS, STOOL CALIBER  Post-operative diagnosis A few polyps   Procedure: Procedure(s):  DIAGNOSTIC COLONOSCOPY  Surgeon: Surgeon(s) and Role:     * Thor Spencer MD - Primary  Anesthesia: Monitor Anesthesia Care   Estimated blood loss: Less than 10 ml  Drains: None  Specimens:   ID Type Source Tests Collected by Time Destination   A : cecal polyp Polyp Large Intestine, Cecum SURGICAL PATHOLOGY EXAM Thor Spencer MD 3/7/2019  9:55 AM    B : proximal sigmoid polyp Polyp Large Intestine, Sigmoid SURGICAL PATHOLOGY EXAM Thor Spencer MD 3/7/2019  9:58 AM    C : mid sigmoid biopsy Polyp Large Intestine, Sigmoid SURGICAL PATHOLOGY EXAM Thor Spencer MD 3/7/2019 10:01 AM      Findings:   A few small polyps removed as above .  Complications: None.  Implants: None.

## 2019-03-07 NOTE — OP NOTE
Mary Jean Lesch MRN# [id number]   YOB: 1955 Age: 63 year old      Date of Admission:  3/7/2019  Date of Service:   3/07/19    Primary care provider: Nena Flanagan    PREOPERATIVE DIAGNOSIS:  Change in stool caliber, history of polyps        POSTOPERATIVE DIAGNOSIS:  Colon polyps x 3          PROCEDURE:  Colonoscopy with polypectomy           INDICATIONS:  Screening colonoscopy.      Specimen:   ID Type Source Tests Collected by Time Destination   A : cecal polyp Polyp Large Intestine, Cecum SURGICAL PATHOLOGY EXAM Thor Spencer MD 3/7/2019  9:55 AM    B : proximal sigmoid polyp Polyp Large Intestine, Sigmoid SURGICAL PATHOLOGY EXAM Thor Spencer MD 3/7/2019  9:58 AM    C : mid sigmoid biopsy Polyp Large Intestine, Sigmoid SURGICAL PATHOLOGY EXAM Thor Spencer MD 3/7/2019 10:01 AM        SURGEON: Thor Spencer    DESCRIPTION OF PROCEDURE: Mary Jean Lesch was brought into the endoscopy suite and placed in the left lateral decubitus position. After preprocedural pause and attended monitored anesthesia was administered, the external anus was inspected and showed some anterior skin tags. Digital rectal exam was normal. The colonoscope was inserted and advanced under direct visualization to the level of the cecum which was identified by the appendiceal orifice and the ileocecal valve. The prep was adequate.. Upon slow withdrawal of the colonoscope, approximately 95% of the mucosa was directly visualized. There were small < 0.5 cm polyps in the cecum and sigmoid colon removed with biopsy forceps. There was an additional biopsy taken in the mid sigmoid of a prominent fold. The rest of the colon was without mucosal abnormality. There was no evidence of further polyps, inflammation, bleeding or AVMs. Retroflexion of the rectum was normal. The extra air was removed from the colon, and the colonoscope withdrawn. The patient tolerated the procedure well and was taken to postanesthesia care unit.      We invite the patient to return in 5 years for follow up screening evaluation, pending pathology related to polyps.    Thor Spencer

## 2019-03-07 NOTE — OR NURSING
Drank fluids no nausea or vomiting IV dced.Patient and responsible adult given discharge instructions with no questions regarding instructions. Espinoza score 20. Pain level 0/10.  Discharged from unit via ambulatory. Patient discharged to home.

## 2019-03-07 NOTE — DISCHARGE INSTRUCTIONS

## 2019-03-09 ENCOUNTER — DOCUMENTATION ONLY (OUTPATIENT)
Dept: OTHER | Facility: CLINIC | Age: 64
End: 2019-03-09

## 2019-03-09 PROBLEM — Z71.89 ACP (ADVANCE CARE PLANNING): Chronic | Status: RESOLVED | Noted: 2017-09-18 | Resolved: 2019-03-09

## 2019-03-12 LAB — COPATH REPORT: NORMAL

## 2019-03-18 ENCOUNTER — ALLIED HEALTH/NURSE VISIT (OUTPATIENT)
Dept: SURGERY | Facility: OTHER | Age: 64
End: 2019-03-18
Attending: SURGERY
Payer: MEDICARE

## 2019-03-18 DIAGNOSIS — Z98.890 S/P COLONOSCOPY: Primary | ICD-10-CM

## 2019-03-18 NOTE — NURSING NOTE
Patient stopped in the clinic to get her colonoscopy results.  Patient had been trying to call the surgery nurses back, but had the wrong number.  Patient given her results and notified of a 3 year recall for colonoscopy.  No further questions.  BAILEY WRIGHT

## 2019-05-13 ENCOUNTER — OFFICE VISIT (OUTPATIENT)
Dept: PODIATRY | Facility: OTHER | Age: 64
End: 2019-05-13
Attending: PODIATRIST
Payer: MEDICARE

## 2019-05-13 VITALS
WEIGHT: 230 LBS | TEMPERATURE: 98.6 F | SYSTOLIC BLOOD PRESSURE: 128 MMHG | BODY MASS INDEX: 32.08 KG/M2 | DIASTOLIC BLOOD PRESSURE: 74 MMHG | OXYGEN SATURATION: 93 % | HEART RATE: 69 BPM

## 2019-05-13 DIAGNOSIS — M79.671 RIGHT FOOT PAIN: ICD-10-CM

## 2019-05-13 DIAGNOSIS — L60.3 ONYCHODYSTROPHY: ICD-10-CM

## 2019-05-13 DIAGNOSIS — L60.0 INGROWN TOENAIL OF RIGHT FOOT: Primary | ICD-10-CM

## 2019-05-13 PROCEDURE — 99202 OFFICE O/P NEW SF 15 MIN: CPT | Mod: 25 | Performed by: PODIATRIST

## 2019-05-13 PROCEDURE — G0463 HOSPITAL OUTPT CLINIC VISIT: HCPCS | Mod: 25

## 2019-05-13 PROCEDURE — G0463 HOSPITAL OUTPT CLINIC VISIT: HCPCS

## 2019-05-13 PROCEDURE — 11750 EXCISION NAIL&NAIL MATRIX: CPT | Mod: T5 | Performed by: PODIATRIST

## 2019-05-13 ASSESSMENT — PAIN SCALES - GENERAL: PAINLEVEL: NO PAIN (0)

## 2019-05-13 NOTE — NURSING NOTE
"Chief Complaint   Patient presents with     Ingrown Toenail     right foot great toenail       Initial /74 (BP Location: Right arm, Patient Position: Sitting, Cuff Size: Adult Regular)   Pulse 69   Temp 98.6  F (37  C) (Tympanic)   Wt 104.3 kg (230 lb)   SpO2 93%   BMI 32.08 kg/m   Estimated body mass index is 32.08 kg/m  as calculated from the following:    Height as of 3/7/19: 1.803 m (5' 11\").    Weight as of this encounter: 104.3 kg (230 lb).  Medication Reconciliation: complete    Gladys Vasquez LPN  "

## 2019-05-13 NOTE — PATIENT INSTRUCTIONS
Nail procedure care:  -Start epsom salt soaks tomorrow. Soak the foot 1-2 times a day for 20 minutes.  -Apply an antibiotic cream, gauze and a bandaid over the toe. Keep the toe covered at all times until it is completely healed.  -You may develop a black scab over the nail bed--let this fall off on its own and don't pick at it.  -The toe may drain for 2-3 weeks. It is normal for it to have a clear drainage.    Watching for signs of infection:  If the toe has a thick, white pus coming from the procedure site or if the the toe becomes red, swollen, painful, or you begin to feel sick (fever/chills/nausea/vomitting), return to the podiatry clinic immediately or to the emergency room is after hours.    -Diabetic Foot Education:  ---Check the feet daily looking for new new blisters or wounds  ---Wear shoes at all times when walking including around the house  ---Avoid lotion application between the toes.   ---If you have any open wounds with signs of infection (redness, swelling, pain, purulence, fever, chills, nausea, vomiting), go to the Emergency Department as soon as possible.
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
## 2019-05-30 ENCOUNTER — OFFICE VISIT (OUTPATIENT)
Dept: PODIATRY | Facility: OTHER | Age: 64
End: 2019-05-30
Attending: PODIATRIST
Payer: MEDICARE

## 2019-05-30 VITALS
OXYGEN SATURATION: 93 % | SYSTOLIC BLOOD PRESSURE: 140 MMHG | WEIGHT: 240 LBS | HEIGHT: 71 IN | DIASTOLIC BLOOD PRESSURE: 80 MMHG | BODY MASS INDEX: 33.6 KG/M2 | HEART RATE: 103 BPM | TEMPERATURE: 99.9 F

## 2019-05-30 DIAGNOSIS — E11.9 DIABETES MELLITUS TYPE 2, NONINSULIN DEPENDENT (H): ICD-10-CM

## 2019-05-30 DIAGNOSIS — M79.671 RIGHT FOOT PAIN: ICD-10-CM

## 2019-05-30 DIAGNOSIS — L60.0 INGROWN TOENAIL OF RIGHT FOOT: Primary | ICD-10-CM

## 2019-05-30 DIAGNOSIS — E11.42 DIABETIC POLYNEUROPATHY ASSOCIATED WITH TYPE 2 DIABETES MELLITUS (H): ICD-10-CM

## 2019-05-30 DIAGNOSIS — L60.3 ONYCHODYSTROPHY: ICD-10-CM

## 2019-05-30 PROCEDURE — G0463 HOSPITAL OUTPT CLINIC VISIT: HCPCS

## 2019-05-30 PROCEDURE — 99212 OFFICE O/P EST SF 10 MIN: CPT | Performed by: PODIATRIST

## 2019-05-30 ASSESSMENT — PAIN SCALES - GENERAL: PAINLEVEL: SEVERE PAIN (7)

## 2019-05-30 ASSESSMENT — MIFFLIN-ST. JEOR: SCORE: 1729.76

## 2019-05-30 NOTE — NURSING NOTE
"Chief Complaint   Patient presents with     Follow Up       Initial /80 (BP Location: Left arm, Patient Position: Chair, Cuff Size: Adult Regular)   Pulse 103   Temp 99.9  F (37.7  C) (Tympanic)   Ht 1.803 m (5' 11\")   Wt 108.9 kg (240 lb)   SpO2 93%   BMI 33.47 kg/m   Estimated body mass index is 33.47 kg/m  as calculated from the following:    Height as of this encounter: 1.803 m (5' 11\").    Weight as of this encounter: 108.9 kg (240 lb).  Medication Reconciliation: complete    VIK SALAS LPN    "
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
## 2019-09-09 ENCOUNTER — APPOINTMENT (OUTPATIENT)
Dept: CT IMAGING | Facility: HOSPITAL | Age: 64
End: 2019-09-09
Attending: PHYSICIAN ASSISTANT
Payer: MEDICARE

## 2019-09-09 ENCOUNTER — APPOINTMENT (OUTPATIENT)
Dept: GENERAL RADIOLOGY | Facility: HOSPITAL | Age: 64
End: 2019-09-09
Attending: PHYSICIAN ASSISTANT
Payer: MEDICARE

## 2019-09-09 ENCOUNTER — HOSPITAL ENCOUNTER (EMERGENCY)
Facility: HOSPITAL | Age: 64
Discharge: HOME | End: 2019-09-09
Attending: PHYSICIAN ASSISTANT | Admitting: PHYSICIAN ASSISTANT
Payer: MEDICARE

## 2019-09-09 VITALS
DIASTOLIC BLOOD PRESSURE: 72 MMHG | TEMPERATURE: 97.9 F | OXYGEN SATURATION: 96 % | SYSTOLIC BLOOD PRESSURE: 148 MMHG | RESPIRATION RATE: 16 BRPM | WEIGHT: 230 LBS | BODY MASS INDEX: 32.08 KG/M2

## 2019-09-09 DIAGNOSIS — R11.10 VOMITING: ICD-10-CM

## 2019-09-09 DIAGNOSIS — R07.89 CHEST WALL PAIN: ICD-10-CM

## 2019-09-09 LAB
ALBUMIN SERPL-MCNC: 4.2 G/DL (ref 3.4–5)
ALP SERPL-CCNC: 122 U/L (ref 40–150)
ALT SERPL W P-5'-P-CCNC: 26 U/L (ref 0–50)
ANION GAP SERPL CALCULATED.3IONS-SCNC: 9 MMOL/L (ref 3–14)
AST SERPL W P-5'-P-CCNC: 31 U/L (ref 0–45)
BASOPHILS # BLD AUTO: 0.1 10E9/L (ref 0–0.2)
BASOPHILS NFR BLD AUTO: 0.6 %
BILIRUB SERPL-MCNC: 0.7 MG/DL (ref 0.2–1.3)
BUN SERPL-MCNC: 23 MG/DL (ref 7–30)
CALCIUM SERPL-MCNC: 9.3 MG/DL (ref 8.5–10.1)
CHLORIDE SERPL-SCNC: 104 MMOL/L (ref 94–109)
CO2 SERPL-SCNC: 27 MMOL/L (ref 20–32)
CREAT SERPL-MCNC: 0.94 MG/DL (ref 0.52–1.04)
DIFFERENTIAL METHOD BLD: ABNORMAL
EOSINOPHIL # BLD AUTO: 0.1 10E9/L (ref 0–0.7)
EOSINOPHIL NFR BLD AUTO: 0.6 %
ERYTHROCYTE [DISTWIDTH] IN BLOOD BY AUTOMATED COUNT: 12.4 % (ref 10–15)
GFR SERPL CREATININE-BSD FRML MDRD: 63 ML/MIN/{1.73_M2}
GLUCOSE SERPL-MCNC: 91 MG/DL (ref 70–99)
HCT VFR BLD AUTO: 46.5 % (ref 35–47)
HGB BLD-MCNC: 15.7 G/DL (ref 11.7–15.7)
IMM GRANULOCYTES # BLD: 0.1 10E9/L (ref 0–0.4)
IMM GRANULOCYTES NFR BLD: 0.5 %
LYMPHOCYTES # BLD AUTO: 2.8 10E9/L (ref 0.8–5.3)
LYMPHOCYTES NFR BLD AUTO: 21.8 %
MCH RBC QN AUTO: 29 PG (ref 26.5–33)
MCHC RBC AUTO-ENTMCNC: 33.8 G/DL (ref 31.5–36.5)
MCV RBC AUTO: 86 FL (ref 78–100)
MONOCYTES # BLD AUTO: 0.9 10E9/L (ref 0–1.3)
MONOCYTES NFR BLD AUTO: 6.9 %
NEUTROPHILS # BLD AUTO: 8.9 10E9/L (ref 1.6–8.3)
NEUTROPHILS NFR BLD AUTO: 69.6 %
NRBC # BLD AUTO: 0 10*3/UL
NRBC BLD AUTO-RTO: 0 /100
PLATELET # BLD AUTO: 275 10E9/L (ref 150–450)
POTASSIUM SERPL-SCNC: 3.5 MMOL/L (ref 3.4–5.3)
PROT SERPL-MCNC: 8 G/DL (ref 6.8–8.8)
RBC # BLD AUTO: 5.42 10E12/L (ref 3.8–5.2)
SODIUM SERPL-SCNC: 140 MMOL/L (ref 133–144)
TROPONIN I SERPL-MCNC: <0.015 UG/L (ref 0–0.04)
WBC # BLD AUTO: 12.8 10E9/L (ref 4–11)

## 2019-09-09 PROCEDURE — 25000128 H RX IP 250 OP 636: Performed by: PHYSICIAN ASSISTANT

## 2019-09-09 PROCEDURE — 71275 CT ANGIOGRAPHY CHEST: CPT | Mod: TC

## 2019-09-09 PROCEDURE — 99285 EMERGENCY DEPT VISIT HI MDM: CPT | Mod: 25

## 2019-09-09 PROCEDURE — 80053 COMPREHEN METABOLIC PANEL: CPT | Performed by: PHYSICIAN ASSISTANT

## 2019-09-09 PROCEDURE — 93005 ELECTROCARDIOGRAM TRACING: CPT

## 2019-09-09 PROCEDURE — 99284 EMERGENCY DEPT VISIT MOD MDM: CPT | Mod: Z6 | Performed by: PHYSICIAN ASSISTANT

## 2019-09-09 PROCEDURE — 36415 COLL VENOUS BLD VENIPUNCTURE: CPT | Performed by: PHYSICIAN ASSISTANT

## 2019-09-09 PROCEDURE — 96374 THER/PROPH/DIAG INJ IV PUSH: CPT | Mod: XU

## 2019-09-09 PROCEDURE — 84484 ASSAY OF TROPONIN QUANT: CPT | Performed by: PHYSICIAN ASSISTANT

## 2019-09-09 PROCEDURE — 96361 HYDRATE IV INFUSION ADD-ON: CPT

## 2019-09-09 PROCEDURE — 96375 TX/PRO/DX INJ NEW DRUG ADDON: CPT | Mod: XU

## 2019-09-09 PROCEDURE — 85025 COMPLETE CBC W/AUTO DIFF WBC: CPT | Performed by: PHYSICIAN ASSISTANT

## 2019-09-09 PROCEDURE — 25000132 ZZH RX MED GY IP 250 OP 250 PS 637: Mod: GY | Performed by: PHYSICIAN ASSISTANT

## 2019-09-09 PROCEDURE — 71046 X-RAY EXAM CHEST 2 VIEWS: CPT | Mod: TC

## 2019-09-09 PROCEDURE — 93010 ELECTROCARDIOGRAM REPORT: CPT | Performed by: INTERNAL MEDICINE

## 2019-09-09 PROCEDURE — 25500064 ZZH RX 255 OP 636: Performed by: RADIOLOGY

## 2019-09-09 RX ORDER — ONDANSETRON 2 MG/ML
4 INJECTION INTRAMUSCULAR; INTRAVENOUS EVERY 30 MIN PRN
Status: DISCONTINUED | OUTPATIENT
Start: 2019-09-09 | End: 2019-09-09 | Stop reason: HOSPADM

## 2019-09-09 RX ORDER — SODIUM CHLORIDE 9 MG/ML
1000 INJECTION, SOLUTION INTRAVENOUS CONTINUOUS
Status: DISCONTINUED | OUTPATIENT
Start: 2019-09-09 | End: 2019-09-09 | Stop reason: HOSPADM

## 2019-09-09 RX ORDER — KETOROLAC TROMETHAMINE 15 MG/ML
15 INJECTION, SOLUTION INTRAMUSCULAR; INTRAVENOUS ONCE
Status: COMPLETED | OUTPATIENT
Start: 2019-09-09 | End: 2019-09-09

## 2019-09-09 RX ORDER — ASPIRIN 81 MG/1
324 TABLET, CHEWABLE ORAL ONCE
Status: COMPLETED | OUTPATIENT
Start: 2019-09-09 | End: 2019-09-09

## 2019-09-09 RX ORDER — HYDROCODONE BITARTRATE AND ACETAMINOPHEN 5; 325 MG/1; MG/1
1 TABLET ORAL EVERY 6 HOURS PRN
Qty: 12 TABLET | Refills: 0 | Status: SHIPPED | OUTPATIENT
Start: 2019-09-09 | End: 2019-12-05

## 2019-09-09 RX ORDER — ONDANSETRON 4 MG/1
4 TABLET, ORALLY DISINTEGRATING ORAL EVERY 8 HOURS PRN
Qty: 20 TABLET | Refills: 0 | Status: SHIPPED | OUTPATIENT
Start: 2019-09-09 | End: 2019-12-05

## 2019-09-09 RX ADMIN — IOHEXOL 75 ML: 350 INJECTION, SOLUTION INTRAVENOUS at 16:53

## 2019-09-09 RX ADMIN — ASPIRIN 81 MG 324 MG: 81 TABLET ORAL at 15:38

## 2019-09-09 RX ADMIN — ONDANSETRON 4 MG: 2 INJECTION, SOLUTION INTRAMUSCULAR; INTRAVENOUS at 15:38

## 2019-09-09 RX ADMIN — KETOROLAC TROMETHAMINE 15 MG: 15 INJECTION, SOLUTION INTRAMUSCULAR; INTRAVENOUS at 15:38

## 2019-09-09 RX ADMIN — SODIUM CHLORIDE 1000 ML: 9 INJECTION, SOLUTION INTRAVENOUS at 15:40

## 2019-09-09 ASSESSMENT — ENCOUNTER SYMPTOMS
DIZZINESS: 0
LIGHT-HEADEDNESS: 0
APPETITE CHANGE: 0
VOMITING: 0
COUGH: 0
CHILLS: 0
SHORTNESS OF BREATH: 1
CHEST TIGHTNESS: 0
FATIGUE: 0
DIAPHORESIS: 0
ACTIVITY CHANGE: 0
ABDOMINAL PAIN: 0
NAUSEA: 0

## 2019-09-09 NOTE — DISCHARGE INSTRUCTIONS
Rest and stay hydrated.     Pain medications as needed.    Zofran as needed.     Follow-up in the clinic.     Return here for any other concerns or questions.     Clear liquid diet.
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
## 2019-09-09 NOTE — ED AVS SNAPSHOT
[facility]  [address]  [place] [address]  Phone:  [phone]                                    Mary Jean Lesch   MRN: [id number]    Department:  [facility]   Date of Visit:  9/9/2019           After Visit Summary Signature Page    I have received my discharge instructions, and my questions have been answered. I have discussed any challenges I see with this plan with the nurse or doctor.    ..........................................................................................................................................  Patient/Patient Representative Signature      ..........................................................................................................................................  Patient Representative Print Name and Relationship to Patient    ..................................................               ................................................  Date                                   Time    ..........................................................................................................................................  Reviewed by Signature/Title    ...................................................              ..............................................  Date                                               Time          22EPIC Rev 08/18

## 2019-10-16 ENCOUNTER — HOSPITAL ENCOUNTER (OUTPATIENT)
Dept: BONE DENSITY | Facility: HOSPITAL | Age: 64
Discharge: HOME | End: 2019-10-16
Attending: NURSE PRACTITIONER | Admitting: NURSE PRACTITIONER
Payer: MEDICARE

## 2019-10-16 DIAGNOSIS — M85.80 OTHER SPECIFIED DISORDERS OF BONE DENSITY AND STRUCTURE, UNSPECIFIED SITE: ICD-10-CM

## 2019-10-16 DIAGNOSIS — Z78.0 ASYMPTOMATIC MENOPAUSAL STATE: ICD-10-CM

## 2019-10-16 PROCEDURE — 77080 DXA BONE DENSITY AXIAL: CPT | Mod: TC

## 2019-12-05 ENCOUNTER — OFFICE VISIT (OUTPATIENT)
Dept: PODIATRY | Facility: OTHER | Age: 64
End: 2019-12-05
Attending: PODIATRIST
Payer: MEDICARE

## 2019-12-05 VITALS
SYSTOLIC BLOOD PRESSURE: 135 MMHG | WEIGHT: 247 LBS | DIASTOLIC BLOOD PRESSURE: 86 MMHG | RESPIRATION RATE: 14 BRPM | HEIGHT: 71 IN | HEART RATE: 94 BPM | TEMPERATURE: 97 F | OXYGEN SATURATION: 94 % | BODY MASS INDEX: 34.58 KG/M2

## 2019-12-05 DIAGNOSIS — E11.42 DIABETIC POLYNEUROPATHY ASSOCIATED WITH TYPE 2 DIABETES MELLITUS (H): ICD-10-CM

## 2019-12-05 DIAGNOSIS — M79.671 RIGHT FOOT PAIN: ICD-10-CM

## 2019-12-05 DIAGNOSIS — S90.851A: Primary | ICD-10-CM

## 2019-12-05 DIAGNOSIS — E11.9 DIABETES MELLITUS TYPE 2, NONINSULIN DEPENDENT (H): ICD-10-CM

## 2019-12-05 DIAGNOSIS — L60.3 ONYCHODYSTROPHY: ICD-10-CM

## 2019-12-05 PROCEDURE — G0463 HOSPITAL OUTPT CLINIC VISIT: HCPCS | Mod: 25

## 2019-12-05 PROCEDURE — 99213 OFFICE O/P EST LOW 20 MIN: CPT | Mod: 25 | Performed by: PODIATRIST

## 2019-12-05 PROCEDURE — 11042 DBRDMT SUBQ TIS 1ST 20SQCM/<: CPT | Performed by: PODIATRIST

## 2019-12-05 PROCEDURE — G0463 HOSPITAL OUTPT CLINIC VISIT: HCPCS

## 2019-12-05 RX ORDER — VENLAFAXINE 37.5 MG/1
37.5 TABLET ORAL DAILY
COMMUNITY
End: 2023-09-26

## 2019-12-05 RX ORDER — VENLAFAXINE HYDROCHLORIDE 150 MG/1
150 CAPSULE, EXTENDED RELEASE ORAL DAILY
COMMUNITY
End: 2023-09-26

## 2019-12-05 ASSESSMENT — MIFFLIN-ST. JEOR: SCORE: 1756.51

## 2019-12-05 ASSESSMENT — PAIN SCALES - GENERAL: PAINLEVEL: MILD PAIN (3)

## 2019-12-05 NOTE — NURSING NOTE
"Chief Complaint   Patient presents with     Musculoskeletal Problem     glass under great toe right foot       Initial /86 (BP Location: Left arm, Patient Position: Sitting)   Pulse 94   Temp 97  F (36.1  C) (Tympanic)   Resp 14   Ht 1.803 m (5' 11\")   Wt 112 kg (247 lb)   SpO2 94%   BMI 34.45 kg/m   Estimated body mass index is 34.45 kg/m  as calculated from the following:    Height as of this encounter: 1.803 m (5' 11\").    Weight as of this encounter: 112 kg (247 lb).  Medication Reconciliation: complete  Mi Maria LPN    "
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
## 2020-06-12 ENCOUNTER — OFFICE VISIT (OUTPATIENT)
Dept: PODIATRY | Facility: OTHER | Age: 65
End: 2020-06-12
Attending: PODIATRIST
Payer: MEDICARE

## 2020-06-12 VITALS
TEMPERATURE: 96.9 F | SYSTOLIC BLOOD PRESSURE: 140 MMHG | DIASTOLIC BLOOD PRESSURE: 72 MMHG | OXYGEN SATURATION: 97 % | HEART RATE: 92 BPM

## 2020-06-12 DIAGNOSIS — L60.3 ONYCHODYSTROPHY: Primary | ICD-10-CM

## 2020-06-12 DIAGNOSIS — M20.42 HAMMER TOE OF LEFT FOOT: ICD-10-CM

## 2020-06-12 DIAGNOSIS — M79.671 RIGHT FOOT PAIN: ICD-10-CM

## 2020-06-12 DIAGNOSIS — R20.8 LOSS OF PROTECTIVE SENSATION OF SKIN OF DEFORMED FOOT: ICD-10-CM

## 2020-06-12 DIAGNOSIS — E11.42 DIABETIC POLYNEUROPATHY ASSOCIATED WITH TYPE 2 DIABETES MELLITUS (H): ICD-10-CM

## 2020-06-12 DIAGNOSIS — E11.9 DIABETES MELLITUS TYPE 2, NONINSULIN DEPENDENT (H): ICD-10-CM

## 2020-06-12 DIAGNOSIS — M21.969 LOSS OF PROTECTIVE SENSATION OF SKIN OF DEFORMED FOOT: ICD-10-CM

## 2020-06-12 PROCEDURE — 99213 OFFICE O/P EST LOW 20 MIN: CPT | Mod: 25 | Performed by: PODIATRIST

## 2020-06-12 PROCEDURE — G0463 HOSPITAL OUTPT CLINIC VISIT: HCPCS | Mod: 25

## 2020-06-12 PROCEDURE — G0463 HOSPITAL OUTPT CLINIC VISIT: HCPCS

## 2020-06-12 PROCEDURE — 11721 DEBRIDE NAIL 6 OR MORE: CPT | Performed by: PODIATRIST

## 2020-06-12 ASSESSMENT — PAIN SCALES - GENERAL: PAINLEVEL: MODERATE PAIN (5)
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
## 2020-06-12 NOTE — NURSING NOTE
"Chief Complaint   Patient presents with     Follow Up     check matrixectomy       Initial BP (!) 140/72 (BP Location: Right arm, Patient Position: Sitting, Cuff Size: Adult Regular)   Pulse 92   Temp 96.9  F (36.1  C) (Tympanic)   SpO2 97%  Estimated body mass index is 34.45 kg/m  as calculated from the following:    Height as of 12/5/19: 1.803 m (5' 11\").    Weight as of 12/5/19: 112 kg (247 lb).  Medication Reconciliation: complete  Gladys Vasquez LPN  "

## 2020-07-06 ENCOUNTER — OFFICE VISIT (OUTPATIENT)
Dept: CHIROPRACTIC MEDICINE | Facility: OTHER | Age: 65
End: 2020-07-06
Attending: CHIROPRACTOR
Payer: MEDICARE

## 2020-07-06 DIAGNOSIS — M99.01 SEGMENTAL AND SOMATIC DYSFUNCTION OF CERVICAL REGION: Primary | ICD-10-CM

## 2020-07-06 DIAGNOSIS — M99.02 SEGMENTAL AND SOMATIC DYSFUNCTION OF THORACIC REGION: ICD-10-CM

## 2020-07-06 DIAGNOSIS — M54.2 CERVICALGIA: ICD-10-CM

## 2020-07-06 PROCEDURE — 98940 CHIROPRACT MANJ 1-2 REGIONS: CPT | Mod: AT | Performed by: CHIROPRACTOR

## 2020-07-07 NOTE — PROGRESS NOTES
Subjective Finding:    Chief compalint: Patient presents with:  Neck Pain: with left shoulder pain from a fall 1 week ago   , Pain Scale: 7/10, Intensity: sharp, Duration: 1 week, Radiating: no.    Date of injury:         Activities that the pain restricts:   Home/household/hobbies/social activities: yes.  Work duties: yes.  Sleep: yes.  Makes symptoms better: rest.  Makes symptoms worse: activity, lumbar extension, lumbar flexion, cervical extension and cervical flexion.  Have you seen anyone else for the symptoms? Yes: MD.  Work related: no.  Automobile related injury:no    Objective and Assessment:    Posture Analysis:   High shoulder: right.  Head tilt: right.  High iliac crest: right.  Head carriage: forward.  Thoracic Kyphosis: neutral.  Lumbar Lordosis: forward.    Lumbar Range of Motion: .  Cervical Range of Motion: extension decreased.  Thoracic Range of Motion: flexion decreased and extension decreased.  Extremity Range of Motion: .    Palpation:   Left scap area    Segmental dysfunction pre-treatment and treatment area: C567  T2.    Assessment post-treatment:  Cervical: ROM increased.  Thoracic: ROM increased.  Lumbar: ROM increased and pain and tenderness decreased.    Comments:      Complicating Factors: pain present for longer than 7 days.    Plan / Procedure:    Treatment plan: PRN.  Instructed patient: ice 20 minutes every other hour as needed, stretch as instructed at visit and walk 10 minutes.  Short term goals: reduce pain.  Long term goals: restore normal function.  Prognosis: very good.

## 2020-10-19 ENCOUNTER — OFFICE VISIT (OUTPATIENT)
Dept: CHIROPRACTIC MEDICINE | Facility: OTHER | Age: 65
End: 2020-10-19
Attending: CHIROPRACTOR
Payer: MEDICARE

## 2020-10-19 DIAGNOSIS — M99.03 SEGMENTAL AND SOMATIC DYSFUNCTION OF LUMBAR REGION: Primary | ICD-10-CM

## 2020-10-19 DIAGNOSIS — M99.01 SEGMENTAL AND SOMATIC DYSFUNCTION OF CERVICAL REGION: ICD-10-CM

## 2020-10-19 DIAGNOSIS — M99.02 SEGMENTAL AND SOMATIC DYSFUNCTION OF THORACIC REGION: ICD-10-CM

## 2020-10-19 DIAGNOSIS — M54.50 ACUTE BILATERAL LOW BACK PAIN WITHOUT SCIATICA: ICD-10-CM

## 2020-10-19 PROCEDURE — 98941 CHIROPRACT MANJ 3-4 REGIONS: CPT | Mod: AT | Performed by: CHIROPRACTOR

## 2020-10-26 ENCOUNTER — OFFICE VISIT (OUTPATIENT)
Dept: CHIROPRACTIC MEDICINE | Facility: OTHER | Age: 65
End: 2020-10-26
Attending: CHIROPRACTOR
Payer: MEDICARE

## 2020-10-26 DIAGNOSIS — M99.01 SEGMENTAL AND SOMATIC DYSFUNCTION OF CERVICAL REGION: ICD-10-CM

## 2020-10-26 DIAGNOSIS — M99.03 SEGMENTAL AND SOMATIC DYSFUNCTION OF LUMBAR REGION: Primary | ICD-10-CM

## 2020-10-26 DIAGNOSIS — M54.50 ACUTE BILATERAL LOW BACK PAIN WITHOUT SCIATICA: ICD-10-CM

## 2020-10-26 DIAGNOSIS — M99.02 SEGMENTAL AND SOMATIC DYSFUNCTION OF THORACIC REGION: ICD-10-CM

## 2020-10-26 PROCEDURE — 98941 CHIROPRACT MANJ 3-4 REGIONS: CPT | Mod: AT | Performed by: CHIROPRACTOR

## 2020-10-27 NOTE — PROGRESS NOTES
Subjective Finding:    Chief compalint: Patient presents with:  Back Pain  , Pain Scale: 7/10, Intensity: sharp, Duration: 1 week, Radiating: no.    Date of injury:         Activities that the pain restricts:   Home/household/hobbies/social activities: yes.  Work duties: yes.  Sleep: yes.  Makes symptoms better: rest.  Makes symptoms worse: activity, lumbar extension, lumbar flexion, cervical extension and cervical flexion.  Have you seen anyone else for the symptoms? Yes: MD.  Work related: no.  Automobile related injury:no    Objective and Assessment:    Posture Analysis:   High shoulder: right.  Head tilt: right.  High iliac crest: right.  Head carriage: forward.  Thoracic Kyphosis: neutral.  Lumbar Lordosis: forward.    Lumbar Range of Motion: .  Cervical Range of Motion: extension decreased.  Thoracic Range of Motion: flexion decreased and extension decreased.  Extremity Range of Motion: .    Palpation:   Left scap area    Segmental dysfunction pre-treatment and treatment area: C567  T2.    Assessment post-treatment:  Cervical: ROM increased.  Thoracic: ROM increased.  Lumbar: ROM increased and pain and tenderness decreased.    Comments:      Complicating Factors: pain present for longer than 7 days.    Plan / Procedure:    Treatment plan: PRN.  Instructed patient: ice 20 minutes every other hour as needed, stretch as instructed at visit and walk 10 minutes.  Short term goals: reduce pain.  Long term goals: restore normal function.  Prognosis: very good.

## 2020-10-30 ENCOUNTER — OFFICE VISIT (OUTPATIENT)
Dept: PODIATRY | Facility: OTHER | Age: 65
End: 2020-10-30
Attending: CLINICAL NURSE SPECIALIST
Payer: MEDICARE

## 2020-10-30 VITALS
SYSTOLIC BLOOD PRESSURE: 118 MMHG | TEMPERATURE: 99.4 F | WEIGHT: 247 LBS | DIASTOLIC BLOOD PRESSURE: 70 MMHG | OXYGEN SATURATION: 97 % | HEART RATE: 97 BPM | BODY MASS INDEX: 34.45 KG/M2

## 2020-10-30 DIAGNOSIS — R20.8 LOSS OF PROTECTIVE SENSATION OF SKIN OF DEFORMED FOOT: ICD-10-CM

## 2020-10-30 DIAGNOSIS — M77.41 METATARSALGIA OF RIGHT FOOT: Primary | ICD-10-CM

## 2020-10-30 DIAGNOSIS — L84 CALLUS OF FOOT: ICD-10-CM

## 2020-10-30 DIAGNOSIS — M21.969 LOSS OF PROTECTIVE SENSATION OF SKIN OF DEFORMED FOOT: ICD-10-CM

## 2020-10-30 DIAGNOSIS — L60.3 ONYCHODYSTROPHY: ICD-10-CM

## 2020-10-30 DIAGNOSIS — E11.42 DIABETIC POLYNEUROPATHY ASSOCIATED WITH TYPE 2 DIABETES MELLITUS (H): ICD-10-CM

## 2020-10-30 DIAGNOSIS — E11.9 DIABETES MELLITUS TYPE 2, NONINSULIN DEPENDENT (H): ICD-10-CM

## 2020-10-30 DIAGNOSIS — M79.671 RIGHT FOOT PAIN: ICD-10-CM

## 2020-10-30 DIAGNOSIS — M20.42 HAMMER TOE OF LEFT FOOT: ICD-10-CM

## 2020-10-30 PROCEDURE — 99213 OFFICE O/P EST LOW 20 MIN: CPT | Mod: 25 | Performed by: PODIATRIST

## 2020-10-30 PROCEDURE — G0463 HOSPITAL OUTPT CLINIC VISIT: HCPCS | Mod: 25

## 2020-10-30 PROCEDURE — 11055 PARING/CUTG B9 HYPRKER LES 1: CPT | Performed by: PODIATRIST

## 2020-10-30 ASSESSMENT — PAIN SCALES - GENERAL: PAINLEVEL: MODERATE PAIN (4)
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
## 2020-10-30 NOTE — NURSING NOTE
"Chief Complaint   Patient presents with     Toe Pain       Initial /70   Pulse 97   Temp 99.4  F (37.4  C) (Tympanic)   Wt 112 kg (247 lb)   SpO2 97%   BMI 34.45 kg/m   Estimated body mass index is 34.45 kg/m  as calculated from the following:    Height as of 12/5/19: 1.803 m (5' 11\").    Weight as of this encounter: 112 kg (247 lb).  Medication Reconciliation: complete  Ailyn Will LPN    "

## 2020-12-10 ENCOUNTER — MEDICAL CORRESPONDENCE (OUTPATIENT)
Dept: MRI IMAGING | Facility: HOSPITAL | Age: 65
End: 2020-12-10

## 2020-12-21 ENCOUNTER — HOSPITAL ENCOUNTER (OUTPATIENT)
Dept: MRI IMAGING | Facility: HOSPITAL | Age: 65
Discharge: HOME | End: 2020-12-21
Attending: PHYSICAL MEDICINE & REHABILITATION | Admitting: PHYSICAL MEDICINE & REHABILITATION
Payer: MEDICARE

## 2020-12-21 DIAGNOSIS — M47.817 SPONDYLOSIS WITHOUT MYELOPATHY OR RADICULOPATHY, LUMBOSACRAL REGION: ICD-10-CM

## 2020-12-21 PROCEDURE — 72148 MRI LUMBAR SPINE W/O DYE: CPT

## 2021-02-23 ENCOUNTER — TRANSFERRED RECORDS (OUTPATIENT)
Dept: HEALTH INFORMATION MANAGEMENT | Facility: CLINIC | Age: 66
End: 2021-02-23

## 2021-03-09 NOTE — PATIENT INSTRUCTIONS
Thank you for allowing Teresita Gavin NP and our ENT team to participate in your care.  If your medications are too expensive, please give the nurse a call.  We can possibly change this medication.  If you have a scheduling or an appointment question please contact our Health Unit Coordinator at their direct line [phone].   ALL nursing questions or concerns can be directed to your ENT nurse at: [phone] Kate Queen  Try to increase water to 100 ounces per day  Start astelin nasal spray as prescribed for post nasal drip    Start budesonide nasal rinses  Budesonide rinses:  Budesonide nasal saline irrigation per instructions:  -Obtain Dirk Med Sinus rinse over the counter.    -Use warm distilled water and 2 packets of the salt solution that comes with the bottle, dissolve in bottle up to the 240 mL natty.  -Add 1 vial of budesonide.  -Irrigate each side of your nose leaning over the sink, using 1/3 to 1/2 the volume of the bottle in each nostril every irrigation.  Irrigate 2 times daily.  -If additional rinses are needed/recommended, you may use the plan Dirk Med Sinus irrigation without the use of added budesonide.     Complete swallow study - the radiology department will call you to schedule    Start prednisolone rinses to mouth 2 times daily x 10 days    Use natural tooth paste, avoid foods that cause irritation    Use biotene rinses twice daily  Try biotene moisturizing spray as directed    Follow up in 3-4 weeks for recheck

## 2021-03-10 ENCOUNTER — OFFICE VISIT (OUTPATIENT)
Dept: OTOLARYNGOLOGY | Facility: OTHER | Age: 66
End: 2021-03-10
Attending: NURSE PRACTITIONER
Payer: COMMERCIAL

## 2021-03-10 VITALS
DIASTOLIC BLOOD PRESSURE: 76 MMHG | WEIGHT: 240 LBS | OXYGEN SATURATION: 98 % | BODY MASS INDEX: 33.6 KG/M2 | TEMPERATURE: 97 F | SYSTOLIC BLOOD PRESSURE: 124 MMHG | HEIGHT: 71 IN | HEART RATE: 86 BPM

## 2021-03-10 DIAGNOSIS — R47.02 DYSPHASIA: Primary | ICD-10-CM

## 2021-03-10 DIAGNOSIS — K14.6 BURNING MOUTH SYNDROME: ICD-10-CM

## 2021-03-10 DIAGNOSIS — R09.82 POST-NASAL DRIP: ICD-10-CM

## 2021-03-10 PROCEDURE — G0463 HOSPITAL OUTPT CLINIC VISIT: HCPCS | Mod: 25

## 2021-03-10 PROCEDURE — 31575 DIAGNOSTIC LARYNGOSCOPY: CPT | Performed by: NURSE PRACTITIONER

## 2021-03-10 PROCEDURE — 99213 OFFICE O/P EST LOW 20 MIN: CPT | Mod: 25 | Performed by: NURSE PRACTITIONER

## 2021-03-10 RX ORDER — AZELASTINE 1 MG/ML
2 SPRAY, METERED NASAL 2 TIMES DAILY
Qty: 30 ML | Refills: 1 | Status: SHIPPED | OUTPATIENT
Start: 2021-03-10 | End: 2023-09-26

## 2021-03-10 RX ORDER — PREDNISOLONE 15 MG/5 ML
SOLUTION, ORAL ORAL
Qty: 300 ML | Refills: 1 | Status: SHIPPED | OUTPATIENT
Start: 2021-03-10 | End: 2021-05-03

## 2021-03-10 RX ORDER — BUDESONIDE 0.5 MG/2ML
INHALANT ORAL
Qty: 30 ML | Refills: 4 | Status: SHIPPED | OUTPATIENT
Start: 2021-03-10 | End: 2023-09-26

## 2021-03-10 ASSESSMENT — PAIN SCALES - GENERAL: PAINLEVEL: MODERATE PAIN (5)

## 2021-03-10 ASSESSMENT — MIFFLIN-ST. JEOR: SCORE: 1719.76

## 2021-03-10 NOTE — LETTER
3/10/2021         RE: Mary J Lesch  [address] [facility] [address]        Dear Colleague,    Thank you for referring your patient, Mary J Lesch, to the [facility]. Please see a copy of my visit note below.    Otolaryngology Note         Chief Complaint:     Patient presents with:  Ent Problem: Pt was referred by [facility] for tongue irritation            History of Present Illness:     Mary J Lesch is a 65 year old female seen today for tongue irritation for the past couple of years with significant increase over the past 6 months.      She has used magic mouthwash in the past with some improvement.      She has tried baking soda toothpaste. All other toothpaste, didn't notice any improvement. She also notes that mouthwash causes more irritation.      She has occasional bumps on the tongue.  No bleeding.      She saw dentist about a month ago.  She had a normal check up.  The dentist did not have much input to the tongue.      Citrus fruits, spicy foods, pepper seems to exacerbate.  Nothing has made it better in the past.      Cinnamon does not seem to bother it.      No unintended weight loss.  She is tying to lose weight.    She has constant ear pain.  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  She has hearing aids through [facility] in [place].  She does not wear them frequently due to canal pain.  She has a history of otorrhea, no recent.      She reports she gets hoarse easily.  She has irritation on the back of the tongue into her throat.  She has some dysphagia with lettuce and other foods.  She denies dysphagia with thin liquids.  She has a history of reflux, takes omeprazole daily.  She had EGD in the past with esophageal dilation.  No recent follow up.  She has a history of uvulectomy in the distant past, ? 10 years ago, unsure why this was completed.  She also has a history of  tonsil and adenoidectomy as a child.     She has nasal congestion, constant sinus pain and pressure.   She uses flonase inconsistently with improvement.     She has seasonal allergies - spring is the worst season.  She has previous allergy testing with immunotherapy.  This was about 40 years ago.   She feels she is allergic to snow mold, budding trees and grass.  She does not currently take an allergy pill    Water - drinks 4 -16 ounce bottles of water per day  Caffeine - 1 cup per day  Alcohol - very seldom  Tobacco - never            Medications:     Current Outpatient Rx   Medication Sig Dispense Refill     ammonium lactate (AMLACTIN) 12 % cream Apply topically 2 times daily as needed for dry skin        ascorbic acid (VITAMIN C) 500 MG tablet Take 500 mg by mouth daily        azelastine (ASTELIN) 0.1 % nasal spray Spray 2 sprays into both nostrils 2 times daily 30 mL 1     budesonide (PULMICORT) 0.5 MG/2ML neb solution Mix 240 ml vishal med sinus rinse, add 0.5 mg budesonide, rinse 1/2 bottle in each nostril twice daily 30 mL 4     Budesonide-Formoterol Fumarate (SYMBICORT IN) Inhale 2 puffs into the lungs daily as needed        cholecalciferol (VITAMIN  -D) 1000 UNITS capsule Take 1 capsule by mouth daily       clonazePAM (KLONOPIN) 0.5 MG tablet Take 0.25 mg by mouth 2 times daily        levothyroxine (SYNTHROID/LEVOTHROID) 200 MCG tablet Take 1 tablet by mouth daily       linaclotide (LINZESS) 145 MCG capsule Take 145 mcg by mouth every morning (before breakfast)       metFORMIN (GLUCOPHAGE-XR) 500 MG 24 hr tablet Take 1 tablet by mouth daily       methylphenidate (RITALIN) 20 MG tablet Take 30 mg by mouth 2 times daily        omeprazole (PRILOSEC) 40 MG capsule Take 40 mg by mouth daily        prednisoLONE (ORAPRED/PRELONE) 15 MG/5ML solution Rinse and spit 15 ml twice daily x 10 days 300 mL 1     simvastatin (ZOCOR) 80 MG tablet Take 80 mg by mouth At Bedtime        solifenacin (VESICARE) 10 MG tablet Take 10  "mg by mouth daily        traZODone (DESYREL) 100 MG tablet Take 1 tablet (100 mg) by mouth At Bedtime Take 2 at bedtime (Patient taking differently: Take 100 mg by mouth At Bedtime ) 90 tablet      venlafaxine (EFFEXOR) 37.5 MG tablet Take 37.5 mg by mouth daily       venlafaxine (EFFEXOR-XR) 150 MG 24 hr capsule Take 150 mg by mouth daily       meclizine (ANTIVERT) 25 MG tablet Take 1 tablet by mouth 3 times daily               Allergies:     Allergies: Strawberry, Aspirin, and No clinical screening - see comments          Past Medical History:     Past Medical History:   Diagnosis Date     Presence of both artificial knee joints     6/10/2016            Past Surgical History:     Past Surgical History:   Procedure Laterality Date     APPENDECTOMY       ARTHROPLASTY KNEE BILATERAL       CA ANESTH,SHOULDER REPLACEMENT Left      CHOLECYSTECTOMY       COLONOSCOPY       COLONOSCOPY N/A 3/7/2019    Procedure: DIAGNOSTIC COLONOSCOPY;  Surgeon: Thor Spencer MD;  Location: [facility]     COLONOSCOPY - HIM SCAN  10/07/2013    Colonoscopy with internal hemorrhoidectomy with Dr. Adela Marshall at [facility] [place] - 10 year repeat recommended  10/2013     HYSTERECTOMY       REPAIR TENDON ACHILLES      x4       ENT family history reviewed         Social History:     Social History     Tobacco Use     Smoking status: Never Smoker     Smokeless tobacco: Never Used   Substance Use Topics     Alcohol use: None     Drug use: None            Review of Systems:     ROS: See HPI         Physical Exam:     /76 (Cuff Size: Adult Large)   Pulse 86   Temp 97  F (36.1  C) (Tympanic)   Ht 1.803 m (5' 11\")   Wt 108.9 kg (240 lb)   SpO2 98%   BMI 33.47 kg/m      General - The patient is well nourished and well developed, and appears to have good nutritional status.  Alert and oriented to person and place, answers questions and cooperates with examination appropriately.   Head and Face - Normocephalic and atraumatic, with no gross asymmetry " noted.  The facial nerve is intact, with strong symmetric movements.  Voice and Breathing - The patient was breathing comfortably without the use of accessory muscles. There was no wheezing, stridor. The patients voice was clear and strong, and had appropriate pitch and quality.  Ears - External ear normal. Canals are patent. Right tympanic membrane is intact without effusion, retraction or mass. Left tympanic membrane is intact without effusion, retraction or mass.  Eyes - Extraocular movements intact, sclera were not icteric or injected.  Mouth - Examination of the oral cavity showed pink, healthy appearing but dry oral mucosa.  No lesions or ulcerations noted. The tongue is dark red, dry and fissured.  There is no ulceration or lesions noted.  Palpation of the entire tongue reveals no masses.    Throat - The walls of the oropharynx were smooth, pink, moist, symmetric, and had no lesions or ulcerations.  The tonsillar pillars and soft palate were symmetric. S/p uvulectomy and tonsillectomy    Neck - Normal midline excursion of the laryngotracheal complex during swallowing.  Full range of motion on passive movement.  Palpation of the occipital, submental, submandibular, internal jugular chain, and supraclavicular nodes did not demonstrate any abnormal lymph nodes or masses.  Palpation of the thyroid was soft and smooth, with no nodules or goiter appreciated.  The trachea was mobile and midline.  Nose - External contour is symmetric, no gross deflection or scars.  Nasal mucosa is pink and moist with no abnormal mucus.  The septum and turbinates were evaluated with nasal speculum, no polyps, masses, or purulence noted on examination of anterior nasal cavity.    Attempts at mirror laryngoscopy were not possible due to gag reflex.  Therefore I proceeded with a fiberoptic examination after informed consent.  First I sprayed both sides of the nose with a mixture of lidocaine and neosynephrine.  I then passed the scope  through the nasal cavity.     The nasopharynx was mucosally covered and symmetric, there is clear pooling drainage in the NP.  The eustachian tube openings were unobstructed.  Going further down I had a clear view of the base of tongue which had normal appearing lingual tonsillar tissue.  The base of tongue was free of lesions, and the vallecula was open.  The epiglottis was smooth and mucosally covered.  The supraglottic larynx was then clearly visualized.  The vocal cords moved smoothly and symmetrically and were pearly white.  Patient had difficulty performing valsalva, limited view of the pyriform sinuses were open and without khadar mass or pooling of secretions.  The limited view of the postcricoid region did not show any lesions.  The patient tolerated the procedure well.         Assessment and Plan:       ICD-10-CM    1. 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 Post-nasal drip  R09.82 budesonide (PULMICORT) 0.5 MG/2ML neb solution     azelastine (ASTELIN) 0.1 % nasal spray     Try to increase water to 100 ounces per day  Start astelin nasal spray as prescribed for post nasal drip    Start budesonide nasal rinses:  Budesonide rinses:  Budesonide nasal saline irrigation per instructions:  -Obtain Dirk Med Sinus rinse over the counter.    -Use warm distilled water and 2 packets of the salt solution that comes with the bottle, dissolve in bottle up to the 240 mL natty.  -Add 1 vial of budesonide.  -Irrigate each side of your nose leaning over the sink, using 1/3 to 1/2 the volume of the bottle in each nostril every irrigation.  Irrigate 2 times daily.  -If additional rinses are needed/recommended, you may use the plan Dirk Med Sinus irrigation without the use of added budesonide.     Complete swallow study - the radiology department will call you to schedule    Start prednisolone rinses to mouth 2 times daily x 10 days    Use natural  tooth paste, avoid foods that cause irritation such as citrus fruits, spicy foods, cinnamon    Use biotene rinses twice daily  Try biotene moisturizing spray as directed    Follow up in 3-4 weeks for recheck, if no improvement we will discuss biopsy of the tongue.      Teresita DÍAZ  [facility]  5:56 PM  March 10, 2021      Scope #[id number]      Again, thank you for allowing me to participate in the care of your patient.        Sincerely,        Teresita Gavin NP

## 2021-03-10 NOTE — NURSING NOTE
"Chief Complaint   Patient presents with     Ent Problem     Pt was referred by [facility] for tongue irritation        Initial /76 (Cuff Size: Adult Large)   Pulse 86   Temp 97  F (36.1  C) (Tympanic)   Ht 1.803 m (5' 11\")   Wt 108.9 kg (240 lb)   SpO2 98%   BMI 33.47 kg/m   Estimated body mass index is 33.47 kg/m  as calculated from the following:    Height as of this encounter: 1.803 m (5' 11\").    Weight as of this encounter: 108.9 kg (240 lb).  Medication Reconciliation: complete  Adela Ramirez LPN    "

## 2021-03-10 NOTE — PROGRESS NOTES
Otolaryngology Note         Chief Complaint:     Patient presents with:  Ent Problem: Pt was referred by [facility] for tongue irritation            History of Present Illness:     Mary J Lesch is a 65 year old female seen today for tongue irritation for the past couple of years with significant increase over the past 6 months.      She has used magic mouthwash in the past with some improvement.      She has tried baking soda toothpaste. All other toothpaste, didn't notice any improvement. She also notes that mouthwash causes more irritation.      She has occasional bumps on the tongue.  No bleeding.      She saw dentist about a month ago.  She had a normal check up.  The dentist did not have much input to the tongue.      Citrus fruits, spicy foods, pepper seems to exacerbate.  Nothing has made it better in the past.      Cinnamon does not seem to bother it.      No unintended weight loss.  She is tying to lose weight.    She has constant ear pain.  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  She has hearing aids through [facility] in [place].  She does not wear them frequently due to canal pain.  She has a history of otorrhea, no recent.      She reports she gets hoarse easily.  She has irritation on the back of the tongue into her throat.  She has some dysphagia with lettuce and other foods.  She denies dysphagia with thin liquids.  She has a history of reflux, takes omeprazole daily.  She had EGD in the past with esophageal dilation.  No recent follow up.  She has a history of uvulectomy in the distant past, ? 10 years ago, unsure why this was completed.  She also has a history of tonsil and adenoidectomy as a child.     She has nasal congestion, constant sinus pain and pressure.   She uses flonase inconsistently with improvement.     She has seasonal allergies - spring is the worst season.  She has previous allergy  testing with immunotherapy.  This was about 40 years ago.   She feels she is allergic to snow mold, budding trees and grass.  She does not currently take an allergy pill    Water - drinks 4 -16 ounce bottles of water per day  Caffeine - 1 cup per day  Alcohol - very seldom  Tobacco - never            Medications:     Current Outpatient Rx   Medication Sig Dispense Refill     ammonium lactate (AMLACTIN) 12 % cream Apply topically 2 times daily as needed for dry skin        ascorbic acid (VITAMIN C) 500 MG tablet Take 500 mg by mouth daily        azelastine (ASTELIN) 0.1 % nasal spray Spray 2 sprays into both nostrils 2 times daily 30 mL 1     budesonide (PULMICORT) 0.5 MG/2ML neb solution Mix 240 ml vishal med sinus rinse, add 0.5 mg budesonide, rinse 1/2 bottle in each nostril twice daily 30 mL 4     Budesonide-Formoterol Fumarate (SYMBICORT IN) Inhale 2 puffs into the lungs daily as needed        cholecalciferol (VITAMIN  -D) 1000 UNITS capsule Take 1 capsule by mouth daily       clonazePAM (KLONOPIN) 0.5 MG tablet Take 0.25 mg by mouth 2 times daily        levothyroxine (SYNTHROID/LEVOTHROID) 200 MCG tablet Take 1 tablet by mouth daily       linaclotide (LINZESS) 145 MCG capsule Take 145 mcg by mouth every morning (before breakfast)       metFORMIN (GLUCOPHAGE-XR) 500 MG 24 hr tablet Take 1 tablet by mouth daily       methylphenidate (RITALIN) 20 MG tablet Take 30 mg by mouth 2 times daily        omeprazole (PRILOSEC) 40 MG capsule Take 40 mg by mouth daily        prednisoLONE (ORAPRED/PRELONE) 15 MG/5ML solution Rinse and spit 15 ml twice daily x 10 days 300 mL 1     simvastatin (ZOCOR) 80 MG tablet Take 80 mg by mouth At Bedtime        solifenacin (VESICARE) 10 MG tablet Take 10 mg by mouth daily        traZODone (DESYREL) 100 MG tablet Take 1 tablet (100 mg) by mouth At Bedtime Take 2 at bedtime (Patient taking differently: Take 100 mg by mouth At Bedtime ) 90 tablet      venlafaxine (EFFEXOR) 37.5 MG tablet Take  "37.5 mg by mouth daily       venlafaxine (EFFEXOR-XR) 150 MG 24 hr capsule Take 150 mg by mouth daily       meclizine (ANTIVERT) 25 MG tablet Take 1 tablet by mouth 3 times daily               Allergies:     Allergies: Strawberry, Aspirin, and No clinical screening - see comments          Past Medical History:     Past Medical History:   Diagnosis Date     Presence of both artificial knee joints     6/10/2016            Past Surgical History:     Past Surgical History:   Procedure Laterality Date     APPENDECTOMY       ARTHROPLASTY KNEE BILATERAL       CA ANESTH,SHOULDER REPLACEMENT Left      CHOLECYSTECTOMY       COLONOSCOPY       COLONOSCOPY N/A 3/7/2019    Procedure: DIAGNOSTIC COLONOSCOPY;  Surgeon: Thor Spencer MD;  Location: [facility]     COLONOSCOPY - HIM SCAN  10/07/2013    Colonoscopy with internal hemorrhoidectomy with Dr. Adela Marshall at [facility] - 10 year repeat recommended  10/2013     HYSTERECTOMY       REPAIR TENDON ACHILLES      x4       ENT family history reviewed         Social History:     Social History     Tobacco Use     Smoking status: Never Smoker     Smokeless tobacco: Never Used   Substance Use Topics     Alcohol use: None     Drug use: None            Review of Systems:     ROS: See HPI         Physical Exam:     /76 (Cuff Size: Adult Large)   Pulse 86   Temp 97  F (36.1  C) (Tympanic)   Ht 1.803 m (5' 11\")   Wt 108.9 kg (240 lb)   SpO2 98%   BMI 33.47 kg/m      General - The patient is well nourished and well developed, and appears to have good nutritional status.  Alert and oriented to person and place, answers questions and cooperates with examination appropriately.   Head and Face - Normocephalic and atraumatic, with no gross asymmetry noted.  The facial nerve is intact, with strong symmetric movements.  Voice and Breathing - The patient was breathing comfortably without the use of accessory muscles. There was no wheezing, stridor. The patients voice was clear and strong, and " had appropriate pitch and quality.  Ears - External ear normal. Canals are patent. Right tympanic membrane is intact without effusion, retraction or mass. Left tympanic membrane is intact without effusion, retraction or mass.  Eyes - Extraocular movements intact, sclera were not icteric or injected.  Mouth - Examination of the oral cavity showed pink, healthy appearing but dry oral mucosa.  No lesions or ulcerations noted. The tongue is dark red, dry and fissured.  There is no ulceration or lesions noted.  Palpation of the entire tongue reveals no masses.    Throat - The walls of the oropharynx were smooth, pink, moist, symmetric, and had no lesions or ulcerations.  The tonsillar pillars and soft palate were symmetric. S/p uvulectomy and tonsillectomy    Neck - Normal midline excursion of the laryngotracheal complex during swallowing.  Full range of motion on passive movement.  Palpation of the occipital, submental, submandibular, internal jugular chain, and supraclavicular nodes did not demonstrate any abnormal lymph nodes or masses.  Palpation of the thyroid was soft and smooth, with no nodules or goiter appreciated.  The trachea was mobile and midline.  Nose - External contour is symmetric, no gross deflection or scars.  Nasal mucosa is pink and moist with no abnormal mucus.  The septum and turbinates were evaluated with nasal speculum, no polyps, masses, or purulence noted on examination of anterior nasal cavity.    Attempts at mirror laryngoscopy were not possible due to gag reflex.  Therefore I proceeded with a fiberoptic examination after informed consent.  First I sprayed both sides of the nose with a mixture of lidocaine and neosynephrine.  I then passed the scope through the nasal cavity.     The nasopharynx was mucosally covered and symmetric, there is clear pooling drainage in the NP.  The eustachian tube openings were unobstructed.  Going further down I had a clear view of the base of tongue which had  normal appearing lingual tonsillar tissue.  The base of tongue was free of lesions, and the vallecula was open.  The epiglottis was smooth and mucosally covered.  The supraglottic larynx was then clearly visualized.  The vocal cords moved smoothly and symmetrically and were pearly white.  Patient had difficulty performing valsalva, limited view of the pyriform sinuses were open and without khadar mass or pooling of secretions.  The limited view of the postcricoid region did not show any lesions.  The patient tolerated the procedure well.         Assessment and Plan:       ICD-10-CM    1. 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 Post-nasal drip  R09.82 budesonide (PULMICORT) 0.5 MG/2ML neb solution     azelastine (ASTELIN) 0.1 % nasal spray     Try to increase water to 100 ounces per day  Start astelin nasal spray as prescribed for post nasal drip    Start budesonide nasal rinses:  Budesonide rinses:  Budesonide nasal saline irrigation per instructions:  -Obtain Dirk Med Sinus rinse over the counter.    -Use warm distilled water and 2 packets of the salt solution that comes with the bottle, dissolve in bottle up to the 240 mL natty.  -Add 1 vial of budesonide.  -Irrigate each side of your nose leaning over the sink, using 1/3 to 1/2 the volume of the bottle in each nostril every irrigation.  Irrigate 2 times daily.  -If additional rinses are needed/recommended, you may use the plan Dirk Med Sinus irrigation without the use of added budesonide.     Complete swallow study - the radiology department will call you to schedule    Start prednisolone rinses to mouth 2 times daily x 10 days    Use natural tooth paste, avoid foods that cause irritation such as citrus fruits, spicy foods, cinnamon    Use biotene rinses twice daily  Try biotene moisturizing spray as directed    Follow up in 3-4 weeks for recheck, if no improvement we will discuss  biopsy of the tongue.      Teresita DÍAZ  [facility]  5:56 PM  March 10, 2021      Scope #[id number]

## 2021-03-15 ENCOUNTER — HOSPITAL ENCOUNTER (OUTPATIENT)
Dept: GENERAL RADIOLOGY | Facility: HOSPITAL | Age: 66
Discharge: HOME | End: 2021-03-15
Attending: NURSE PRACTITIONER | Admitting: NURSE PRACTITIONER
Payer: COMMERCIAL

## 2021-03-15 DIAGNOSIS — R47.02 DYSPHASIA: ICD-10-CM

## 2021-03-15 PROCEDURE — 74220 X-RAY XM ESOPHAGUS 1CNTRST: CPT

## 2021-03-15 RX ORDER — BARIUM SULFATE 400 MG/ML
SUSPENSION ORAL ONCE
Status: COMPLETED | OUTPATIENT
Start: 2021-03-15 | End: 2021-03-15

## 2021-03-15 RX ADMIN — BARIUM SULFATE 240 ML: 400 SUSPENSION ORAL at 09:52

## 2021-03-16 DIAGNOSIS — K22.4 ESOPHAGEAL MOTILITY DISORDER: ICD-10-CM

## 2021-03-16 DIAGNOSIS — R47.02 DYSPHASIA: Primary | ICD-10-CM

## 2021-03-31 ENCOUNTER — OFFICE VISIT (OUTPATIENT)
Dept: SURGERY | Facility: OTHER | Age: 66
End: 2021-03-31
Attending: NURSE PRACTITIONER
Payer: COMMERCIAL

## 2021-03-31 ENCOUNTER — PREP FOR PROCEDURE (OUTPATIENT)
Dept: SURGERY | Facility: OTHER | Age: 66
End: 2021-03-31

## 2021-03-31 ENCOUNTER — OFFICE VISIT (OUTPATIENT)
Dept: OTOLARYNGOLOGY | Facility: OTHER | Age: 66
End: 2021-03-31
Attending: NURSE PRACTITIONER
Payer: COMMERCIAL

## 2021-03-31 ENCOUNTER — OFFICE VISIT (OUTPATIENT)
Dept: AUDIOLOGY | Facility: OTHER | Age: 66
End: 2021-03-31
Attending: AUDIOLOGIST
Payer: COMMERCIAL

## 2021-03-31 VITALS
BODY MASS INDEX: 33.6 KG/M2 | WEIGHT: 240 LBS | HEIGHT: 71 IN | TEMPERATURE: 98.4 F | OXYGEN SATURATION: 98 % | DIASTOLIC BLOOD PRESSURE: 68 MMHG | SYSTOLIC BLOOD PRESSURE: 126 MMHG | HEART RATE: 79 BPM

## 2021-03-31 VITALS
BODY MASS INDEX: 33.47 KG/M2 | TEMPERATURE: 97.7 F | WEIGHT: 240 LBS | HEART RATE: 79 BPM | OXYGEN SATURATION: 100 % | DIASTOLIC BLOOD PRESSURE: 70 MMHG | SYSTOLIC BLOOD PRESSURE: 128 MMHG

## 2021-03-31 DIAGNOSIS — R68.2 ORAL DRYNESS: Primary | ICD-10-CM

## 2021-03-31 DIAGNOSIS — K21.9 GASTROESOPHAGEAL REFLUX DISEASE WITHOUT ESOPHAGITIS: ICD-10-CM

## 2021-03-31 DIAGNOSIS — R13.10 DYSPHAGIA: Primary | ICD-10-CM

## 2021-03-31 DIAGNOSIS — Z01.818 PREOP TESTING: Primary | ICD-10-CM

## 2021-03-31 DIAGNOSIS — R47.02 DYSPHASIA: ICD-10-CM

## 2021-03-31 DIAGNOSIS — H90.3 SENSORINEURAL HEARING LOSS (SNHL) OF BOTH EARS: Primary | ICD-10-CM

## 2021-03-31 DIAGNOSIS — R09.82 POST-NASAL DRIP: ICD-10-CM

## 2021-03-31 DIAGNOSIS — H90.3 SENSORINEURAL HEARING LOSS (SNHL) OF BOTH EARS: ICD-10-CM

## 2021-03-31 DIAGNOSIS — K14.6 BURNING MOUTH SYNDROME: ICD-10-CM

## 2021-03-31 PROCEDURE — 86235 NUCLEAR ANTIGEN ANTIBODY: CPT | Mod: ZL | Performed by: NURSE PRACTITIONER

## 2021-03-31 PROCEDURE — G0463 HOSPITAL OUTPT CLINIC VISIT: HCPCS

## 2021-03-31 PROCEDURE — 36415 COLL VENOUS BLD VENIPUNCTURE: CPT | Mod: ZL | Performed by: NURSE PRACTITIONER

## 2021-03-31 PROCEDURE — G0463 HOSPITAL OUTPT CLINIC VISIT: HCPCS | Mod: 27

## 2021-03-31 PROCEDURE — 99213 OFFICE O/P EST LOW 20 MIN: CPT | Performed by: NURSE PRACTITIONER

## 2021-03-31 PROCEDURE — 99204 OFFICE O/P NEW MOD 45 MIN: CPT | Performed by: SURGERY

## 2021-03-31 PROCEDURE — 92567 TYMPANOMETRY: CPT | Performed by: AUDIOLOGIST

## 2021-03-31 PROCEDURE — 92557 COMPREHENSIVE HEARING TEST: CPT | Performed by: AUDIOLOGIST

## 2021-03-31 ASSESSMENT — PAIN SCALES - GENERAL
PAINLEVEL: NO PAIN (0)
PAINLEVEL: MILD PAIN (3)

## 2021-03-31 ASSESSMENT — MIFFLIN-ST. JEOR: SCORE: 1719.76

## 2021-03-31 NOTE — LETTER
3/31/2021         RE: Mary J Lesch  [address] [place] [address]        Dear Colleague,    Thank you for referring your patient, Mary J Lesch, to the [facility]. Please see a copy of my visit note below.    Otolaryngology Note         Chief Complaint:     Patient presents with:  Dysphagia: 2 week followup           History of Present Illness:     Mary J Lesch is a 65 year old female seen today for follow up dysphagia and tongue irritation.  She feels she has improvement in the tongue irritation.  She feels about 50% improvement.      She notes right sided ear pain and itchy feeling.  Reports it feels thick.  She feels decreased hearing in the left ear that started about 1 week ago.  She has constant ringing in both ears.  She denies right ear pain, she has frequent left sided ear pain.   She reports she has fluctuation in hearing on the right.  She has noted this for many years.  No known previous history of Meniere's.  She has associated feeling of off balance.  No khadar rotary vertigo or nausea.  She has not taken anything for her symptoms.  She last had hearing test completed at [facility] about 6 years ago.  She has flux hearing in the right ear that she notices about one time per month and it lasts 1-2 days.  This time it has been a week.  She feels that ringing is louder than normal since decrease in hearing.      Audiogram completed today:  Tympanograms are Type A for left ear suggesting normal eardrum mobility and Type AD right-hypermobility.  Acoustic Reflex Thresholds were attempted and couldn not obtain likely due to Type AD tympanogram.  Thresholds are moderate-severe sloping to profound primarily sensorineural hearing loss-slight mixed component.  Speech reception thresholds are in good agreement with pure tone average.  Word discrimination scores are excellent at supra-thresholds level. She reports the levels of WRS are like her hearing aid setting.    In reviewing her  previous audiogram that I have access to from [facility] on 1/22/13, there is no significant changes.  She previously has moderately severe bilateral SNHL with speech thresholds of 60 dB and excellent work recognition.     She has nasal congestion, constant sinus pain and pressure.   She uses flonase inconsistently with improvement.      She has seasonal allergies - spring is the worst season.  She has previous allergy testing with immunotherapy.  This was about 40 years ago.   She feels she is allergic to snow mold, budding trees and grass.  She does not currently take an allergy pill     Water - drinks 4 -16 ounce bottles of water per day  Caffeine - 1 cup per day  Alcohol - very seldom  Tobacco - never          Medications:     Current Outpatient Rx   Medication Sig Dispense Refill     ascorbic acid (VITAMIN C) 500 MG tablet Take 500 mg by mouth daily        azelastine (ASTELIN) 0.1 % nasal spray Spray 2 sprays into both nostrils 2 times daily 30 mL 1     budesonide (PULMICORT) 0.5 MG/2ML neb solution Mix 240 ml vishal med sinus rinse, add 0.5 mg budesonide, rinse 1/2 bottle in each nostril twice daily 30 mL 4     Budesonide-Formoterol Fumarate (SYMBICORT IN) Inhale 2 puffs into the lungs daily as needed        cholecalciferol (VITAMIN  -D) 1000 UNITS capsule Take 1 capsule by mouth daily       clonazePAM (KLONOPIN) 0.5 MG tablet Take 0.25 mg by mouth 2 times daily        levothyroxine (SYNTHROID/LEVOTHROID) 88 MCG tablet Take 1 tablet by mouth daily        metFORMIN (GLUCOPHAGE-XR) 500 MG 24 hr tablet Take 1 tablet by mouth daily       methylphenidate (RITALIN) 20 MG tablet Take 30 mg by mouth 2 times daily        prednisoLONE (ORAPRED/PRELONE) 15 MG/5ML solution Rinse and spit 15 ml twice daily x 10 days 300 mL 1     solifenacin (VESICARE) 10 MG tablet Take 10 mg by mouth daily        traZODone (DESYREL) 100 MG tablet Take 1 tablet (100 mg) by mouth At Bedtime Take 2 at bedtime (Patient taking differently: Take 100  mg by mouth At Bedtime ) 90 tablet      venlafaxine (EFFEXOR) 37.5 MG tablet Take 37.5 mg by mouth daily       venlafaxine (EFFEXOR-XR) 150 MG 24 hr capsule Take 150 mg by mouth daily       ammonium lactate (AMLACTIN) 12 % cream Apply topically 2 times daily as needed for dry skin        atorvastatin (LIPITOR) 80 MG tablet Take 80 mg by mouth daily       betamethasone dipropionate (DIPROSONE) 0.05 % external cream Apply topically 2 times daily       bisacodyl (DULCOLAX) 5 MG EC tablet Take 5 mg by mouth daily as needed for constipation       EPINEPHrine (ANY BX GENERIC EQUIV) 0.3 MG/0.3ML injection 2-pack Inject 0.3 mg into the muscle as needed for anaphylaxis       famotidine (PEPCID) 20 MG tablet Take 20 mg by mouth At Bedtime       gabapentin (NEURONTIN) 600 MG tablet Take 600 mg by mouth 2 times daily       guaiFENesin (MUCINEX) 600 MG 12 hr tablet Take 1,200 mg by mouth 2 times daily       levocetirizine (XYZAL) 5 MG tablet Take 5 mg by mouth every evening       levothyroxine (SYNTHROID/LEVOTHROID) 100 MCG tablet Take 100 mcg by mouth daily       lidocaine (LIDODERM) 5 % patch Place 2 patches onto the skin every 24 hours To prevent lidocaine toxicity, patient should be patch free for 12 hrs daily.       linaclotide (LINZESS) 145 MCG capsule Take 145 mcg by mouth every morning (before breakfast)       meclizine (ANTIVERT) 25 MG tablet Take 1 tablet by mouth 3 times daily        montelukast (SINGULAIR) 10 MG tablet Take 10 mg by mouth At Bedtime       Multiple Vitamins-Minerals (CEROVITE SENIOR PO) Take 1 tablet by mouth daily       naproxen sodium (ANAPROX) 220 MG tablet Take 220 mg by mouth 2 times daily (with meals)       omeprazole (PRILOSEC) 40 MG DR capsule Take 40 mg by mouth daily       oxybutynin ER (DITROPAN XL) 15 MG 24 hr tablet Take 15 mg by mouth daily       polyethylene glycol-propylene glycol (SYSTANE ULTRA) 0.4-0.3 % SOLN ophthalmic solution Place 1 drop into both eyes 4 times daily as needed for  "dry eyes       tiZANidine (ZANAFLEX) 4 MG capsule Take 4 mg by mouth 3 times daily as needed for muscle spasms              Allergies:     Allergies: Strawberry, Aspirin, and No clinical screening - see comments          Past Medical History:     Past Medical History:   Diagnosis Date     Presence of both artificial knee joints     6/10/2016            Past Surgical History:     Past Surgical History:   Procedure Laterality Date     APPENDECTOMY       ARTHROPLASTY KNEE BILATERAL       CA ANESTH,SHOULDER REPLACEMENT Left      CHOLECYSTECTOMY       COLONOSCOPY       COLONOSCOPY N/A 3/7/2019    Procedure: DIAGNOSTIC COLONOSCOPY;  Surgeon: Thor Spencer MD;  Location: [facility]     COLONOSCOPY - HIM SCAN  10/07/2013    Colonoscopy with internal hemorrhoidectomy with Dr. Adela Marshall at [facility] - 10 year repeat recommended  10/2013     ESOPHAGOSCOPY, GASTROSCOPY, DUODENOSCOPY (EGD), DILATATION, COMBINED N/A 4/22/2021    Procedure: Upper Endoscopy with BIOPSY;  Surgeon: Juan Alonso MD;  Location: [facility] OR     HYSTERECTOMY       REPAIR TENDON ACHILLES      x4       ENT family history reviewed         Social History:     Social History     Tobacco Use     Smoking status: Never Smoker     Smokeless tobacco: Never Used   Substance Use Topics     Alcohol use: None     Drug use: None            Review of Systems:     ROS: See HPI         Physical Exam:     /68 (BP Location: Right arm, Patient Position: Sitting, Cuff Size: Adult Large)   Pulse 79   Temp 98.4  F (36.9  C) (Tympanic)   Ht 1.803 m (5' 11\")   Wt 108.9 kg (240 lb)   SpO2 98%   BMI 33.47 kg/m      General - The patient is well nourished and well developed, and appears to have good nutritional status.  Alert and oriented to person and place, answers questions and cooperates with examination appropriately.   Head and Face - Normocephalic and atraumatic, with no gross asymmetry noted.  The facial nerve is intact, with strong symmetric movements.  Voice " and Breathing - The patient was breathing comfortably without the use of accessory muscles. There was no wheezing, stridor. The patients voice was clear and strong, and had appropriate pitch and quality.  Ears - External ear normal. Canals are patent. Right tympanic membrane is intact without effusion, retraction or mass. Left tympanic membrane is intact without effusion, retraction or mass.  Eyes - Extraocular movements intact, sclera were not icteric or injected.  Mouth - Examination of the oral cavity showed dry mucous membranes. No lesions or ulcerations noted. The tongue was mobile and midline, dry with symmetric fissured appearance.   Throat - The walls of the oropharynx were smooth, pink, moist, symmetric, and had no lesions or ulcerations.  The tonsillar pillars and soft palate were symmetric. The uvula was midline on elevation.    Neck - Normal midline excursion of the laryngotracheal complex during swallowing.  Full range of motion on passive movement.  Palpation of the occipital, submental, submandibular, internal jugular chain, and supraclavicular nodes did not demonstrate any abnormal lymph nodes or masses.  Palpation of the thyroid was soft and smooth, with no nodules or goiter appreciated.  The trachea was mobile and midline.  Nose - External contour is symmetric, no gross deflection or scars.  Nasal mucosa is pink and moist with no abnormal mucus.  The septum and turbinates were evaluated with nasal speculum.  No polyps, masses, or purulence noted on examination.         Assessment and Plan:       ICD-10-CM    1. Oral dryness  R68.2 SSA Ro JOANIE Antibody IgG     SSB La JOANIE Antibody IgG     ESR: Erythrocyte sedimentation rate     Rheumatoid factor     Anti Nuclear Maria Luisa IgG by IFA with Reflex     CANCELED: Anti Nuclear Maria Luisa IgG by IFA with Reflex     CANCELED: ESR: Erythrocyte sedimentation rate     CANCELED: Rheumatoid factor   2. Sensorineural hearing loss (SNHL) of both ears  H90.3    3. Burning mouth  syndrome  K14.6    4. Post-nasal drip  R09.82    5. Dysphasia  R47.02    6. Gastroesophageal reflux disease without esophagitis  K21.9        Labs for Sjogrens panel    See PCP for med review to possibly get off meds that cause dry mouth    See Dentist every 6 month and practice good oral care    Return in 3-4 weeks for follow up with Teresita Gavin NP    Keep scheduled appointment for EGD    No evidence of SSNHL    Scope # [id number]        Again, thank you for allowing me to participate in the care of your patient.        Sincerely,        Teresita Gavin, NP

## 2021-03-31 NOTE — PROGRESS NOTES
Audiology Evaluation Completed. Please refer SCANNED AUDIOGRAM and/or TYMPANOGRAM for BACKGROUND, RESULTS, RECOMMENDATIONS.      Ophelia GARCIA, [facility]  Audiologist #[id number]

## 2021-03-31 NOTE — PATIENT INSTRUCTIONS
Thank you for allowing our surgical team to participate in your care. Please review the following instructions to prepare for your upcoming Upper Endoscopy. You may call any of the numbers listed below with questions you may have.  [facility] Health Unit Coordinator: [phone]  Clinic Surgery Nurse ([place]): [phone]  Surgery Education Nurse: [phone]    Date of Procedure: 4/22/21 with Dr. Alonso  Admit time: Surgery  will call you the day before your procedure by 5pm with your admit time. If your surgery is on Monday, please expect a call on Friday. If we were unable to reach you by 5PM, you may call  [phone] for your arrival time.  COVID-19 test is needed 4 days before procedure in the morning. This testing is done in the [facility] on the [place] side of [facility] (weekdays and weekends) or in the [facility] at the [facility] (weekdays only).  Follow the signage for parking and bring your mobile phone if you have one to call the phone number on the sign outside the testing site for check-in. If you do not have a cell phone, please call the nurse for instructions on checking in. This has been scheduled for 4/17/21 at 10:30 at the [place] site.      Please call the Surgery Education Nurses 1-2 weeks prior to your surgery date at  [phone] for further instructions. Have your medication/allergy lists ready.    Do not take Aspirin (325mg), other NSAIDs (Ibuprofen, Motrin, Aleve, Celebrex, Naproxen, etc.) vitamins/supplements 7 days before your surgery. If you are on blood thinners or insulin, please call your primary care provider for instruction. If you are prescribed an 81 mg Aspirin, you may continue.    Please call the clinic surgery nurse or your regular doctor if you get sick within 1-2 weeks of the procedure. (vomiting, diarrhea, fever, cough, cold or any other symptoms of illness)    Do not eat any solid foods or milk products after 10pm the night prior to  "surgery.   You may have clear liquids only up until 4 hours prior to surgery.   Please see the list of liquids you may have and may not have on page 2 of the separate \"Instructions for Your Upper Endoscopy\" packet.     You will need a responsible adult available to drive you home and stay with you for at least 4 hours after you leave the hospital. You will not be allowed to drive yourself. If you need to take a taxi or the bus you must have a responsible person to ride with you (not the taxi/). Your procedure will be cancelled if you do not bring a responsible adult.    Questions or concerns can be directed to the clinic or surgery education nurse at any of the numbers listed above. If you have a scheduling or appointment question, please call the Health Unit Coordinator between 8am and 4pm Monday through Friday. After hours or on weekends, please call [phone] to postpone.                     "

## 2021-03-31 NOTE — NURSING NOTE
"Chief Complaint   Patient presents with     Consult     NPT  Abnormal Barium Swallow         Initial /70 (BP Location: Left arm, Patient Position: Sitting, Cuff Size: Adult Large)   Pulse 79   Temp 97.7  F (36.5  C) (Tympanic)   Wt 108.9 kg (240 lb)   SpO2 100%   BMI 33.47 kg/m   Estimated body mass index is 33.47 kg/m  as calculated from the following:    Height as of 3/10/21: 1.803 m (5' 11\").    Weight as of this encounter: 108.9 kg (240 lb).  Medication Reconciliation: complete  Desiree John LPN  "

## 2021-03-31 NOTE — PROGRESS NOTES
CLINIC NOTE - CONSULT  3/31/2021    Patient : Mary J Lesch  Referring Physician : Teresita Gavin    Reason for Referral : Dysphagia    This is a 65 year old female with a history of GERD.  Patient is in need of an EGD.      Last EGD : several years  History of GERD : YES  History of PUD : NO  On Acid supression : YES   Drug and Dose : Losec 40 mg daily   Symptoms well controled on current therapy:  YES  History of dysphagia : YES   Dysphagia to solids greater than liquids : YES  Hematemesis : NO  Melena : NO    Past Medical History:  Past Medical History:   Diagnosis Date     Presence of both artificial knee joints     6/10/2016       Past Surgical History:  Past Surgical History:   Procedure Laterality Date     APPENDECTOMY       ARTHROPLASTY KNEE BILATERAL       CA ANESTH,SHOULDER REPLACEMENT Left      CHOLECYSTECTOMY       COLONOSCOPY       COLONOSCOPY N/A 3/7/2019    Procedure: DIAGNOSTIC COLONOSCOPY;  Surgeon: Thor Spencer MD;  Location: [facility]     COLONOSCOPY - HIM SCAN  10/07/2013    [place] [facility] [place]     HYSTERECTOMY       REPAIR TENDON ACHILLES      x4       Family History History:  History reviewed. No pertinent family history.    History of Tobacco Use:  History   Smoking Status     Never Smoker   Smokeless Tobacco     Never Used       Current Medications:  Current Outpatient Medications   Medication Sig Dispense Refill     ammonium lactate (AMLACTIN) 12 % cream Apply topically 2 times daily as needed for dry skin        ascorbic acid (VITAMIN C) 500 MG tablet Take 500 mg by mouth daily        azelastine (ASTELIN) 0.1 % nasal spray Spray 2 sprays into both nostrils 2 times daily 30 mL 1     budesonide (PULMICORT) 0.5 MG/2ML neb solution Mix 240 ml vishal med sinus rinse, add 0.5 mg budesonide, rinse 1/2 bottle in each nostril twice daily 30 mL 4     Budesonide-Formoterol Fumarate (SYMBICORT IN) Inhale 2  puffs into the lungs daily as needed        cholecalciferol (VITAMIN  -D) 1000 UNITS capsule Take 1 capsule by mouth daily       clonazePAM (KLONOPIN) 0.5 MG tablet Take 0.25 mg by mouth 2 times daily        levothyroxine (SYNTHROID/LEVOTHROID) 200 MCG tablet Take 1 tablet by mouth daily       linaclotide (LINZESS) 145 MCG capsule Take 145 mcg by mouth every morning (before breakfast)       meclizine (ANTIVERT) 25 MG tablet Take 1 tablet by mouth 3 times daily        metFORMIN (GLUCOPHAGE-XR) 500 MG 24 hr tablet Take 1 tablet by mouth daily       methylphenidate (RITALIN) 20 MG tablet Take 30 mg by mouth 2 times daily        omeprazole (PRILOSEC) 40 MG capsule Take 40 mg by mouth daily        prednisoLONE (ORAPRED/PRELONE) 15 MG/5ML solution Rinse and spit 15 ml twice daily x 10 days 300 mL 1     simvastatin (ZOCOR) 80 MG tablet Take 80 mg by mouth At Bedtime        solifenacin (VESICARE) 10 MG tablet Take 10 mg by mouth daily        traZODone (DESYREL) 100 MG tablet Take 1 tablet (100 mg) by mouth At Bedtime Take 2 at bedtime (Patient taking differently: Take 100 mg by mouth At Bedtime ) 90 tablet      venlafaxine (EFFEXOR) 37.5 MG tablet Take 37.5 mg by mouth daily       venlafaxine (EFFEXOR-XR) 150 MG 24 hr capsule Take 150 mg by mouth daily         Allergies:  Allergies   Allergen Reactions     Strawberry Anaphylaxis     Aspirin      No Clinical Screening - See Comments Hives     Trees, dandelions, pussy willows, and flowers     RADIOLOGY:    Results for orders placed during the hospital encounter of 03/15/21   XR Esophagram    Narrative PROCEDURE: XR ESOPHAGRAM 3/15/2021 9:53 AM    HISTORY: Dysphasia    COMPARISONS: None.    TECHNIQUE: Barium swallow    FINDINGS: Normal swallowing occurred. There was no abnormal pooling in  the vallecula or piriform sinuses. No aspiration occurred. There was  no cricopharyngeus incoordination. Tertiary contraction waves are seen  in the distal half of the esophagus. There is no  hiatal hernia or  reflux.         Impression IMPRESSION: Disordered esophageal motility in the distal half of the  esophagus. No hiatal hernia or reflux. Normal swallowing occurred.    Fluoroscopy time 1.5 minutes    NANCY MARTINEZ MD     ROS:  Pertinent items are noted in HPI.  All other systems are negative.    PHYSICAL EXAM:     Vital signs: /70 (BP Location: Left arm, Patient Position: Sitting, Cuff Size: Adult Large)   Pulse 79   Temp 97.7  F (36.5  C) (Tympanic)   Wt 108.9 kg (240 lb)   SpO2 100%   BMI 33.47 kg/m     Weight: [unfilled]   BMI: Body mass index is 33.47 kg/m .   General: Normal, healthy, cooperative, in no acute distress   Skin: no jaundice   HEENT: PERRLA and EOMI   Neck: supple   Lungs: clear to auscultation   CV: Regular rate and rhythm without murmer   Abdominal: abdomen is soft without significant tenderness, masses, organomegaly or guarding   Extremities: No cyanosis, clubbing or edema noted bilaterally in Upper and Lower Extremities   Neurological: without deficit    Assessment:   65 year old female with dysphagia.  Barium swallow does show esophageal dysmotility.    Plan:   Will schedule an esophagogastroduodenoscopy with possible dilation.  The procedures with their risks, benefits and alternatives were explained.  Risks include but are not limited to bleeding, perforation, missing lesions, need for additional procedures, reaction to anesthesia.  All the patients questions were answered.  The patient consents to proceed.  The procedure will be scheduled.    Have a discussion with her about esophageal dysmotility's.  Answered all of her questions.  She does understand that we may or may not help her symptoms given that she does have an esophageal dysmotility.

## 2021-03-31 NOTE — NURSING NOTE
"Chief Complaint   Patient presents with     Dysphagia     2 week followup       Initial /68 (BP Location: Right arm, Patient Position: Sitting, Cuff Size: Adult Large)   Pulse 79   Temp 98.4  F (36.9  C) (Tympanic)   Ht 1.803 m (5' 11\")   Wt 108.9 kg (240 lb)   SpO2 98%   BMI 33.47 kg/m   Estimated body mass index is 33.47 kg/m  as calculated from the following:    Height as of this encounter: 1.803 m (5' 11\").    Weight as of this encounter: 108.9 kg (240 lb).  Medication Reconciliation: complete  Tati Houser LPN    "

## 2021-03-31 NOTE — PROGRESS NOTES
Otolaryngology Note         Chief Complaint:     Patient presents with:  Dysphagia: 2 week followup           History of Present Illness:     Mary J Lesch is a 65 year old female seen today for follow up dysphagia and tongue irritation.  She feels she has improvement in the tongue irritation.  She feels about 50% improvement.      She notes right sided ear pain and itchy feeling.  Reports it feels thick.  She feels decreased hearing in the left ear that started about 1 week ago.  She has constant ringing in both ears.  She denies right ear pain, she has frequent left sided ear pain.   She reports she has fluctuation in hearing on the right.  She has noted this for many years.  No known previous history of Meniere's.  She has associated feeling of off balance.  No khadar rotary vertigo or nausea.  She has not taken anything for her symptoms.  She last had hearing test completed at [facility] about 6 years ago.  She has flux hearing in the right ear that she notices about one time per month and it lasts 1-2 days.  This time it has been a week.  She feels that ringing is louder than normal since decrease in hearing.      Audiogram completed today:  Tympanograms are Type A for left ear suggesting normal eardrum mobility and Type AD right-hypermobility.  Acoustic Reflex Thresholds were attempted and couldn not obtain likely due to Type AD tympanogram.  Thresholds are moderate-severe sloping to profound primarily sensorineural hearing loss-slight mixed component.  Speech reception thresholds are in good agreement with pure tone average.  Word discrimination scores are excellent at supra-thresholds level. She reports the levels of WRS are like her hearing aid setting.    In reviewing her previous audiogram that I have access to from [facility] on 1/22/13, there is no significant changes.  She previously has moderately severe bilateral SNHL with speech thresholds of 60 dB and excellent work recognition.     She has nasal  congestion, constant sinus pain and pressure.   She uses flonase inconsistently with improvement.      She has seasonal allergies - spring is the worst season.  She has previous allergy testing with immunotherapy.  This was about 40 years ago.   She feels she is allergic to snow mold, budding trees and grass.  She does not currently take an allergy pill     Water - drinks 4 -16 ounce bottles of water per day  Caffeine - 1 cup per day  Alcohol - very seldom  Tobacco - never          Medications:     Current Outpatient Rx   Medication Sig Dispense Refill     ascorbic acid (VITAMIN C) 500 MG tablet Take 500 mg by mouth daily        azelastine (ASTELIN) 0.1 % nasal spray Spray 2 sprays into both nostrils 2 times daily 30 mL 1     budesonide (PULMICORT) 0.5 MG/2ML neb solution Mix 240 ml vishal med sinus rinse, add 0.5 mg budesonide, rinse 1/2 bottle in each nostril twice daily 30 mL 4     Budesonide-Formoterol Fumarate (SYMBICORT IN) Inhale 2 puffs into the lungs daily as needed        cholecalciferol (VITAMIN  -D) 1000 UNITS capsule Take 1 capsule by mouth daily       clonazePAM (KLONOPIN) 0.5 MG tablet Take 0.25 mg by mouth 2 times daily        levothyroxine (SYNTHROID/LEVOTHROID) 88 MCG tablet Take 1 tablet by mouth daily        metFORMIN (GLUCOPHAGE-XR) 500 MG 24 hr tablet Take 1 tablet by mouth daily       methylphenidate (RITALIN) 20 MG tablet Take 30 mg by mouth 2 times daily        prednisoLONE (ORAPRED/PRELONE) 15 MG/5ML solution Rinse and spit 15 ml twice daily x 10 days 300 mL 1     solifenacin (VESICARE) 10 MG tablet Take 10 mg by mouth daily        traZODone (DESYREL) 100 MG tablet Take 1 tablet (100 mg) by mouth At Bedtime Take 2 at bedtime (Patient taking differently: Take 100 mg by mouth At Bedtime ) 90 tablet      venlafaxine (EFFEXOR) 37.5 MG tablet Take 37.5 mg by mouth daily       venlafaxine (EFFEXOR-XR) 150 MG 24 hr capsule Take 150 mg by mouth daily       ammonium lactate (AMLACTIN) 12 % cream Apply  topically 2 times daily as needed for dry skin        atorvastatin (LIPITOR) 80 MG tablet Take 80 mg by mouth daily       betamethasone dipropionate (DIPROSONE) 0.05 % external cream Apply topically 2 times daily       bisacodyl (DULCOLAX) 5 MG EC tablet Take 5 mg by mouth daily as needed for constipation       EPINEPHrine (ANY BX GENERIC EQUIV) 0.3 MG/0.3ML injection 2-pack Inject 0.3 mg into the muscle as needed for anaphylaxis       famotidine (PEPCID) 20 MG tablet Take 20 mg by mouth At Bedtime       gabapentin (NEURONTIN) 600 MG tablet Take 600 mg by mouth 2 times daily       guaiFENesin (MUCINEX) 600 MG 12 hr tablet Take 1,200 mg by mouth 2 times daily       levocetirizine (XYZAL) 5 MG tablet Take 5 mg by mouth every evening       levothyroxine (SYNTHROID/LEVOTHROID) 100 MCG tablet Take 100 mcg by mouth daily       lidocaine (LIDODERM) 5 % patch Place 2 patches onto the skin every 24 hours To prevent lidocaine toxicity, patient should be patch free for 12 hrs daily.       linaclotide (LINZESS) 145 MCG capsule Take 145 mcg by mouth every morning (before breakfast)       meclizine (ANTIVERT) 25 MG tablet Take 1 tablet by mouth 3 times daily        montelukast (SINGULAIR) 10 MG tablet Take 10 mg by mouth At Bedtime       Multiple Vitamins-Minerals (CEROVITE SENIOR PO) Take 1 tablet by mouth daily       naproxen sodium (ANAPROX) 220 MG tablet Take 220 mg by mouth 2 times daily (with meals)       omeprazole (PRILOSEC) 40 MG DR capsule Take 40 mg by mouth daily       oxybutynin ER (DITROPAN XL) 15 MG 24 hr tablet Take 15 mg by mouth daily       polyethylene glycol-propylene glycol (SYSTANE ULTRA) 0.4-0.3 % SOLN ophthalmic solution Place 1 drop into both eyes 4 times daily as needed for dry eyes       tiZANidine (ZANAFLEX) 4 MG capsule Take 4 mg by mouth 3 times daily as needed for muscle spasms              Allergies:     Allergies: Strawberry, Aspirin, and No clinical screening - see comments          Past Medical  "History:     Past Medical History:   Diagnosis Date     Presence of both artificial knee joints     6/10/2016            Past Surgical History:     Past Surgical History:   Procedure Laterality Date     APPENDECTOMY       ARTHROPLASTY KNEE BILATERAL       CA ANESTH,SHOULDER REPLACEMENT Left      CHOLECYSTECTOMY       COLONOSCOPY       COLONOSCOPY N/A 3/7/2019    Procedure: DIAGNOSTIC COLONOSCOPY;  Surgeon: Thor Spencer MD;  Location: [facility]     COLONOSCOPY - HIM SCAN  10/07/2013    Colonoscopy with internal hemorrhoidectomy with Dr. Adela Marshall at [facility] [place] - 10 year repeat recommended  10/2013     ESOPHAGOSCOPY, GASTROSCOPY, DUODENOSCOPY (EGD), DILATATION, COMBINED N/A 4/22/2021    Procedure: Upper Endoscopy with BIOPSY;  Surgeon: Juan Alonso MD;  Location: [facility]     HYSTERECTOMY       REPAIR TENDON ACHILLES      x4       ENT family history reviewed         Social History:     Social History     Tobacco Use     Smoking status: Never Smoker     Smokeless tobacco: Never Used   Substance Use Topics     Alcohol use: None     Drug use: None            Review of Systems:     ROS: See HPI         Physical Exam:     /68 (BP Location: Right arm, Patient Position: Sitting, Cuff Size: Adult Large)   Pulse 79   Temp 98.4  F (36.9  C) (Tympanic)   Ht 1.803 m (5' 11\")   Wt 108.9 kg (240 lb)   SpO2 98%   BMI 33.47 kg/m      General - The patient is well nourished and well developed, and appears to have good nutritional status.  Alert and oriented to person and place, answers questions and cooperates with examination appropriately.   Head and Face - Normocephalic and atraumatic, with no gross asymmetry noted.  The facial nerve is intact, with strong symmetric movements.  Voice and Breathing - The patient was breathing comfortably without the use of accessory muscles. There was no wheezing, stridor. The patients voice was clear and strong, and had appropriate pitch and quality.  Ears - External ear normal. " Canals are patent. Right tympanic membrane is intact without effusion, retraction or mass. Left tympanic membrane is intact without effusion, retraction or mass.  Eyes - Extraocular movements intact, sclera were not icteric or injected.  Mouth - Examination of the oral cavity showed dry mucous membranes. No lesions or ulcerations noted. The tongue was mobile and midline, dry with symmetric fissured appearance.   Throat - The walls of the oropharynx were smooth, pink, moist, symmetric, and had no lesions or ulcerations.  The tonsillar pillars and soft palate were symmetric. The uvula was midline on elevation.    Neck - Normal midline excursion of the laryngotracheal complex during swallowing.  Full range of motion on passive movement.  Palpation of the occipital, submental, submandibular, internal jugular chain, and supraclavicular nodes did not demonstrate any abnormal lymph nodes or masses.  Palpation of the thyroid was soft and smooth, with no nodules or goiter appreciated.  The trachea was mobile and midline.  Nose - External contour is symmetric, no gross deflection or scars.  Nasal mucosa is pink and moist with no abnormal mucus.  The septum and turbinates were evaluated with nasal speculum.  No polyps, masses, or purulence noted on examination.         Assessment and Plan:       ICD-10-CM    1. Oral dryness  R68.2 SSA Ro JOANIE Antibody IgG     SSB La JOANIE Antibody IgG     ESR: Erythrocyte sedimentation rate     Rheumatoid factor     Anti Nuclear Maria Luisa IgG by IFA with Reflex     CANCELED: Anti Nuclear Maria Luisa IgG by IFA with Reflex     CANCELED: ESR: Erythrocyte sedimentation rate     CANCELED: Rheumatoid factor   2. 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 Post-nasal drip  R09.82    5. Dysphasia  R47.02    6. Gastroesophageal reflux disease without esophagitis  K21.9        Labs for Sjogrens panel    See PCP for med review to possibly get off meds that cause dry  mouth    See Dentist every 6 month and practice good oral care    Return in 3-4 weeks for follow up with Teresita Gavin NP    Keep scheduled appointment for EGD    No evidence of SSNHL    Scope # [id number]

## 2021-04-02 LAB
ENA SS-A IGG SER IA-ACNC: <0.2 AI (ref 0–0.9)
ENA SS-B IGG SER IA-ACNC: <0.2 AI (ref 0–0.9)

## 2021-04-13 ENCOUNTER — TRANSFERRED RECORDS (OUTPATIENT)
Dept: HEALTH INFORMATION MANAGEMENT | Facility: HOSPITAL | Age: 66
End: 2021-04-13

## 2021-04-13 ENCOUNTER — ANESTHESIA EVENT (OUTPATIENT)
Dept: SURGERY | Facility: HOSPITAL | Age: 66
End: 2021-04-13
Payer: COMMERCIAL

## 2021-04-13 ASSESSMENT — COPD QUESTIONNAIRES: COPD: 1

## 2021-04-13 NOTE — ANESTHESIA PREPROCEDURE EVALUATION
Anesthesia Pre-Procedure Evaluation    Patient: Mary J Lesch   MRN: [id number] : 1955        Preoperative Diagnosis: Dysphagia [R13.10]  Gastroesophageal reflux disease without esophagitis [K21.9]   Procedure : Procedure(s):  Upper Endoscopy with dilation     Past Medical History:   Diagnosis Date     Presence of both artificial knee joints     6/10/2016      Past Surgical History:   Procedure Laterality Date     APPENDECTOMY       ARTHROPLASTY KNEE BILATERAL       CA ANESTH,SHOULDER REPLACEMENT Left      CHOLECYSTECTOMY       COLONOSCOPY       COLONOSCOPY N/A 3/7/2019    Procedure: DIAGNOSTIC COLONOSCOPY;  Surgeon: Thor Spencer MD;  Location: [facility]     COLONOSCOPY - HIM SCAN  10/07/2013    Colonoscopy with internal hemorrhoidectomy with Dr. Adela Marshall at [facility] [place] 10 [place] repeat recommended  10/2013     HYSTERECTOMY       REPAIR TENDON ACHILLES      x4      Allergies   Allergen Reactions     Strawberry Anaphylaxis     Aspirin      No Clinical Screening - See Comments Hives     Trees, dandelions, pussy willows, and flowers      Social History     Tobacco Use     Smoking status: Never Smoker     Smokeless tobacco: Never Used   Substance Use Topics     Alcohol use: Not on file      Wt Readings from Last 1 Encounters:   21 108.9 kg (240 lb)        Anesthesia Evaluation   Pt has had prior anesthetic. Type: MAC and General.        ROS/MED HX  ENT/Pulmonary: Comment: Last rescue inhaler use summer 2020    (+) sleep apnea, DIMITRY risk factors, hypertension, daytime somnolence, mild,  COPD,     Neurologic: Comment: TBI  BPPV    (+) peripheral neuropathy, - bilateral feet. 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 thyroid problem, hypothyroidism, Obesity,     Psychiatric/Substance Use:     (+) psychiatric history anxiety, depression and bipolar     Infectious Disease: Comment: In nares    (+) MRSA,     Malignancy:  - neg malignancy ROS     Other:      (+) , H/O Chronic Pain,        Physical Exam    Airway  airway exam normal      Mallampati: II   TM distance: > 3 FB   Neck ROM: limited   Mouth opening: > 3 cm    Respiratory Devices and Support         Dental  no notable dental history         Cardiovascular   cardiovascular exam normal       Rhythm and rate: regular and normal     Pulmonary   pulmonary exam normal        breath sounds clear to auscultation           OUTSIDE LABS:  CBC:   Lab Results   Component Value Date    WBC 12.8 (H) 09/09/2019    WBC 9.1 02/26/2019    HGB 15.7 09/09/2019    HGB 13.5 02/26/2019    HCT 46.5 09/09/2019    HCT 39.8 02/26/2019     09/09/2019     02/26/2019     BMP:   Lab Results   Component Value Date     09/09/2019     01/16/2019    POTASSIUM 3.5 09/09/2019    POTASSIUM 3.8 01/16/2019    CHLORIDE 104 09/09/2019    CHLORIDE 103 01/16/2019    CO2 27 09/09/2019    CO2 30 01/16/2019    BUN 23 09/09/2019    BUN 18 01/16/2019    CR 0.94 09/09/2019    CR 0.68 01/16/2019    GLC 91 09/09/2019    GLC 91 01/16/2019     COAGS:   Lab Results   Component Value Date    PTT 30 05/28/2014    INR 1.02 05/28/2014     POC:   Lab Results   Component Value Date    BGM 76 01/18/2019     HEPATIC:   Lab Results   Component Value Date    ALBUMIN 4.2 09/09/2019    PROTTOTAL 8.0 09/09/2019    ALT 26 09/09/2019    AST 31 09/09/2019    ALKPHOS 122 09/09/2019     BILITOTAL 0.7 09/09/2019     OTHER:   Lab Results   Component Value Date    LACT 1.2 01/16/2019    A1C 5.8 (H) 01/16/2019    KENDALL 9.3 09/09/2019    MAG 1.7 (L) 05/28/2014    TSH 2.27 01/16/2019    CRP 5.0 (H) 05/28/2014       Anesthesia Plan    ASA Status:  3   NPO Status:  NPO Appropriate    Anesthesia Type: MAC.     - Reason for MAC: chronic cardiopulmonary disease, straight local not clinically adequate              Consents    Anesthesia Plan(s) and associated risks, benefits, and realistic alternatives discussed. Questions answered and patient/representative(s) expressed understanding.     - Discussed with:  Patient      - Specific Concerns: arrhythmia, seizure, stroke, MI, cardio/pulmonary collapse, death .     - Extended Intubation/Ventilatory Support Discussed: No.      - Patient is DNR/DNI Status: No    Use of blood products discussed: No .     Postoperative Care            Comments:    Gavin 3/31/21            GUZMAN White CRNA

## 2021-04-14 RX ORDER — OMEPRAZOLE 40 MG/1
40 CAPSULE, DELAYED RELEASE ORAL DAILY
COMMUNITY
End: 2023-09-26

## 2021-04-14 RX ORDER — LIDOCAINE 50 MG/G
2 PATCH TOPICAL EVERY 24 HOURS
COMMUNITY

## 2021-04-14 RX ORDER — LEVOCETIRIZINE DIHYDROCHLORIDE 5 MG/1
5 TABLET, FILM COATED ORAL EVERY EVENING
COMMUNITY
End: 2023-09-26

## 2021-04-14 RX ORDER — NAPROXEN SODIUM 220 MG
220 TABLET ORAL 2 TIMES DAILY WITH MEALS
COMMUNITY

## 2021-04-14 RX ORDER — EPINEPHRINE 0.3 MG/.3ML
0.3 INJECTION SUBCUTANEOUS PRN
COMMUNITY

## 2021-04-14 RX ORDER — GABAPENTIN 600 MG/1
600 TABLET ORAL 2 TIMES DAILY
COMMUNITY

## 2021-04-14 RX ORDER — BISACODYL 5 MG/1
5 TABLET, DELAYED RELEASE ORAL DAILY PRN
COMMUNITY
End: 2022-04-25

## 2021-04-14 RX ORDER — BETAMETHASONE DIPROPIONATE 0.5 MG/G
CREAM TOPICAL 2 TIMES DAILY
COMMUNITY
End: 2023-09-26

## 2021-04-14 RX ORDER — ATORVASTATIN CALCIUM 80 MG/1
80 TABLET, FILM COATED ORAL DAILY
COMMUNITY

## 2021-04-14 RX ORDER — MONTELUKAST SODIUM 10 MG/1
10 TABLET ORAL AT BEDTIME
COMMUNITY
End: 2021-06-18

## 2021-04-14 RX ORDER — TIZANIDINE HYDROCHLORIDE 4 MG/1
4 CAPSULE, GELATIN COATED ORAL 3 TIMES DAILY PRN
COMMUNITY

## 2021-04-14 RX ORDER — GUAIFENESIN 600 MG/1
1200 TABLET, EXTENDED RELEASE ORAL 2 TIMES DAILY
COMMUNITY
End: 2023-09-26

## 2021-04-14 RX ORDER — OXYBUTYNIN CHLORIDE 15 MG/1
15 TABLET, EXTENDED RELEASE ORAL DAILY
COMMUNITY

## 2021-04-14 RX ORDER — FAMOTIDINE 20 MG/1
20 TABLET, FILM COATED ORAL AT BEDTIME
COMMUNITY

## 2021-04-14 RX ORDER — LEVOTHYROXINE SODIUM 100 UG/1
100 TABLET ORAL DAILY
COMMUNITY
End: 2022-04-25

## 2021-04-16 ENCOUNTER — ANCILLARY PROCEDURE (OUTPATIENT)
Dept: GENERAL RADIOLOGY | Facility: OTHER | Age: 66
End: 2021-04-16
Attending: PODIATRIST
Payer: COMMERCIAL

## 2021-04-16 ENCOUNTER — OFFICE VISIT (OUTPATIENT)
Dept: PODIATRY | Facility: OTHER | Age: 66
End: 2021-04-16
Attending: PODIATRIST
Payer: COMMERCIAL

## 2021-04-16 VITALS
BODY MASS INDEX: 33.47 KG/M2 | HEART RATE: 86 BPM | OXYGEN SATURATION: 98 % | WEIGHT: 240 LBS | TEMPERATURE: 97.6 F | DIASTOLIC BLOOD PRESSURE: 78 MMHG | SYSTOLIC BLOOD PRESSURE: 130 MMHG

## 2021-04-16 DIAGNOSIS — L60.3 ONYCHODYSTROPHY: ICD-10-CM

## 2021-04-16 DIAGNOSIS — M77.41 METATARSALGIA OF RIGHT FOOT: ICD-10-CM

## 2021-04-16 DIAGNOSIS — E11.9 DIABETES MELLITUS TYPE 2, NONINSULIN DEPENDENT (H): ICD-10-CM

## 2021-04-16 DIAGNOSIS — R20.8 LOSS OF PROTECTIVE SENSATION OF SKIN OF DEFORMED FOOT: ICD-10-CM

## 2021-04-16 DIAGNOSIS — S92.514A CLOSED NONDISPLACED FRACTURE OF PROXIMAL PHALANX OF LESSER TOE OF RIGHT FOOT, INITIAL ENCOUNTER: Primary | ICD-10-CM

## 2021-04-16 DIAGNOSIS — M21.969 LOSS OF PROTECTIVE SENSATION OF SKIN OF DEFORMED FOOT: ICD-10-CM

## 2021-04-16 DIAGNOSIS — M79.671 RIGHT FOOT PAIN: ICD-10-CM

## 2021-04-16 DIAGNOSIS — E11.42 DIABETIC POLYNEUROPATHY ASSOCIATED WITH TYPE 2 DIABETES MELLITUS (H): ICD-10-CM

## 2021-04-16 DIAGNOSIS — M79.674 TOE PAIN, RIGHT: ICD-10-CM

## 2021-04-16 DIAGNOSIS — M20.42 HAMMER TOE OF LEFT FOOT: ICD-10-CM

## 2021-04-16 DIAGNOSIS — L84 CALLUS OF FOOT: ICD-10-CM

## 2021-04-16 PROCEDURE — G0463 HOSPITAL OUTPT CLINIC VISIT: HCPCS

## 2021-04-16 PROCEDURE — 99214 OFFICE O/P EST MOD 30 MIN: CPT | Performed by: PODIATRIST

## 2021-04-16 PROCEDURE — 73630 X-RAY EXAM OF FOOT: CPT | Mod: TC,RT

## 2021-04-16 ASSESSMENT — PAIN SCALES - GENERAL: PAINLEVEL: MODERATE PAIN (5)
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
## 2021-04-16 NOTE — NURSING NOTE
"Chief Complaint   Patient presents with     Fracture       Initial /78   Pulse 86   Temp 97.6  F (36.4  C) (Tympanic)   Wt 108.9 kg (240 lb)   SpO2 98%   BMI 33.47 kg/m   Estimated body mass index is 33.47 kg/m  as calculated from the following:    Height as of 3/31/21: 1.803 m (5' 11\").    Weight as of this encounter: 108.9 kg (240 lb).  Medication Reconciliation: complete  Ailyn Will LPN    "

## 2021-04-17 ENCOUNTER — OFFICE VISIT (OUTPATIENT)
Dept: FAMILY MEDICINE | Facility: OTHER | Age: 66
End: 2021-04-17
Attending: SURGERY
Payer: COMMERCIAL

## 2021-04-17 DIAGNOSIS — Z01.818 PREOP TESTING: ICD-10-CM

## 2021-04-17 LAB
SARS-COV-2 RNA RESP QL NAA+PROBE: NORMAL
SPECIMEN SOURCE: NORMAL

## 2021-04-17 PROCEDURE — U0005 INFEC AGEN DETEC AMPLI PROBE: HCPCS | Mod: ZL | Performed by: SURGERY

## 2021-04-17 PROCEDURE — U0003 INFECTIOUS AGENT DETECTION BY NUCLEIC ACID (DNA OR RNA); SEVERE ACUTE RESPIRATORY SYNDROME CORONAVIRUS 2 (SARS-COV-2) (CORONAVIRUS DISEASE [COVID-19]), AMPLIFIED PROBE TECHNIQUE, MAKING USE OF HIGH THROUGHPUT TECHNOLOGIES AS DESCRIBED BY CMS-2020-01-R: HCPCS | Mod: ZL | Performed by: SURGERY

## 2021-04-18 LAB
LABORATORY COMMENT REPORT: NORMAL
SARS-COV-2 RNA RESP QL NAA+PROBE: NEGATIVE
SPECIMEN SOURCE: NORMAL

## 2021-04-22 ENCOUNTER — ANESTHESIA (OUTPATIENT)
Dept: SURGERY | Facility: HOSPITAL | Age: 66
End: 2021-04-22
Payer: COMMERCIAL

## 2021-04-22 ENCOUNTER — HOSPITAL ENCOUNTER (OUTPATIENT)
Facility: HOSPITAL | Age: 66
Discharge: HOME | End: 2021-04-22
Attending: SURGERY | Admitting: SURGERY
Payer: COMMERCIAL

## 2021-04-22 VITALS
OXYGEN SATURATION: 93 % | SYSTOLIC BLOOD PRESSURE: 148 MMHG | HEART RATE: 77 BPM | TEMPERATURE: 97.9 F | HEIGHT: 71 IN | DIASTOLIC BLOOD PRESSURE: 70 MMHG | RESPIRATION RATE: 18 BRPM | BODY MASS INDEX: 34.75 KG/M2 | WEIGHT: 248.2 LBS

## 2021-04-22 DIAGNOSIS — K21.9 GASTROESOPHAGEAL REFLUX DISEASE WITHOUT ESOPHAGITIS: ICD-10-CM

## 2021-04-22 DIAGNOSIS — R13.10 DYSPHAGIA: ICD-10-CM

## 2021-04-22 PROCEDURE — 360N000076 HC SURGERY LEVEL 3, PER MIN: Performed by: SURGERY

## 2021-04-22 PROCEDURE — 370N000017 HC ANESTHESIA TECHNICAL FEE, PER MIN: Performed by: SURGERY

## 2021-04-22 PROCEDURE — 43239 EGD BIOPSY SINGLE/MULTIPLE: CPT | Performed by: NURSE ANESTHETIST, CERTIFIED REGISTERED

## 2021-04-22 PROCEDURE — 710N000012 HC RECOVERY PHASE 2, PER MINUTE: Performed by: SURGERY

## 2021-04-22 PROCEDURE — 88305 TISSUE EXAM BY PATHOLOGIST: CPT | Mod: TC | Performed by: SURGERY

## 2021-04-22 PROCEDURE — 272N000001 HC OR GENERAL SUPPLY STERILE: Performed by: SURGERY

## 2021-04-22 PROCEDURE — 258N000003 HC RX IP 258 OP 636: Performed by: NURSE ANESTHETIST, CERTIFIED REGISTERED

## 2021-04-22 PROCEDURE — 250N000009 HC RX 250: Performed by: NURSE ANESTHETIST, CERTIFIED REGISTERED

## 2021-04-22 PROCEDURE — 43239 EGD BIOPSY SINGLE/MULTIPLE: CPT | Performed by: SURGERY

## 2021-04-22 PROCEDURE — 250N000011 HC RX IP 250 OP 636: Performed by: NURSE ANESTHETIST, CERTIFIED REGISTERED

## 2021-04-22 PROCEDURE — 999N000141 HC STATISTIC PRE-PROCEDURE NURSING ASSESSMENT: Performed by: SURGERY

## 2021-04-22 RX ORDER — ALBUTEROL SULFATE 0.83 MG/ML
2.5 SOLUTION RESPIRATORY (INHALATION) EVERY 4 HOURS PRN
Status: DISCONTINUED | OUTPATIENT
Start: 2021-04-22 | End: 2021-04-22 | Stop reason: HOSPADM

## 2021-04-22 RX ORDER — ONDANSETRON 2 MG/ML
4 INJECTION INTRAMUSCULAR; INTRAVENOUS EVERY 30 MIN PRN
Status: DISCONTINUED | OUTPATIENT
Start: 2021-04-22 | End: 2021-04-22 | Stop reason: HOSPADM

## 2021-04-22 RX ORDER — NALOXONE HYDROCHLORIDE 0.4 MG/ML
0.4 INJECTION, SOLUTION INTRAMUSCULAR; INTRAVENOUS; SUBCUTANEOUS
Status: DISCONTINUED | OUTPATIENT
Start: 2021-04-22 | End: 2021-04-22 | Stop reason: HOSPADM

## 2021-04-22 RX ORDER — NALOXONE HYDROCHLORIDE 0.4 MG/ML
0.2 INJECTION, SOLUTION INTRAMUSCULAR; INTRAVENOUS; SUBCUTANEOUS
Status: DISCONTINUED | OUTPATIENT
Start: 2021-04-22 | End: 2021-04-22 | Stop reason: HOSPADM

## 2021-04-22 RX ORDER — LIDOCAINE HYDROCHLORIDE 20 MG/ML
INJECTION, SOLUTION INFILTRATION; PERINEURAL PRN
Status: DISCONTINUED | OUTPATIENT
Start: 2021-04-22 | End: 2021-04-22

## 2021-04-22 RX ORDER — LABETALOL 20 MG/4 ML (5 MG/ML) INTRAVENOUS SYRINGE
10
Status: DISCONTINUED | OUTPATIENT
Start: 2021-04-22 | End: 2021-04-22 | Stop reason: HOSPADM

## 2021-04-22 RX ORDER — SODIUM CHLORIDE, SODIUM LACTATE, POTASSIUM CHLORIDE, CALCIUM CHLORIDE 600; 310; 30; 20 MG/100ML; MG/100ML; MG/100ML; MG/100ML
INJECTION, SOLUTION INTRAVENOUS CONTINUOUS
Status: DISCONTINUED | OUTPATIENT
Start: 2021-04-22 | End: 2021-04-22 | Stop reason: HOSPADM

## 2021-04-22 RX ORDER — PROPOFOL 10 MG/ML
INJECTION, EMULSION INTRAVENOUS PRN
Status: DISCONTINUED | OUTPATIENT
Start: 2021-04-22 | End: 2021-04-22

## 2021-04-22 RX ORDER — MEPERIDINE HYDROCHLORIDE 25 MG/ML
12.5 INJECTION INTRAMUSCULAR; INTRAVENOUS; SUBCUTANEOUS
Status: DISCONTINUED | OUTPATIENT
Start: 2021-04-22 | End: 2021-04-22 | Stop reason: HOSPADM

## 2021-04-22 RX ORDER — ONDANSETRON 4 MG/1
4 TABLET, ORALLY DISINTEGRATING ORAL EVERY 30 MIN PRN
Status: DISCONTINUED | OUTPATIENT
Start: 2021-04-22 | End: 2021-04-22 | Stop reason: HOSPADM

## 2021-04-22 RX ORDER — HYDROMORPHONE HYDROCHLORIDE 1 MG/ML
.3-.5 INJECTION, SOLUTION INTRAMUSCULAR; INTRAVENOUS; SUBCUTANEOUS EVERY 10 MIN PRN
Status: DISCONTINUED | OUTPATIENT
Start: 2021-04-22 | End: 2021-04-22 | Stop reason: HOSPADM

## 2021-04-22 RX ORDER — LIDOCAINE 40 MG/G
CREAM TOPICAL
Status: DISCONTINUED | OUTPATIENT
Start: 2021-04-22 | End: 2021-04-22 | Stop reason: HOSPADM

## 2021-04-22 RX ORDER — FENTANYL CITRATE 50 UG/ML
25-50 INJECTION, SOLUTION INTRAMUSCULAR; INTRAVENOUS EVERY 5 MIN PRN
Status: DISCONTINUED | OUTPATIENT
Start: 2021-04-22 | End: 2021-04-22 | Stop reason: HOSPADM

## 2021-04-22 RX ADMIN — PROPOFOL 50 MG: 10 INJECTION, EMULSION INTRAVENOUS at 14:36

## 2021-04-22 RX ADMIN — SODIUM CHLORIDE, POTASSIUM CHLORIDE, SODIUM LACTATE AND CALCIUM CHLORIDE: 600; 310; 30; 20 INJECTION, SOLUTION INTRAVENOUS at 13:38

## 2021-04-22 RX ADMIN — PROPOFOL 50 MG: 10 INJECTION, EMULSION INTRAVENOUS at 14:34

## 2021-04-22 RX ADMIN — LIDOCAINE HYDROCHLORIDE 40 MG: 20 INJECTION, SOLUTION INFILTRATION; PERINEURAL at 14:33

## 2021-04-22 ASSESSMENT — MIFFLIN-ST. JEOR: SCORE: 1756.96

## 2021-04-22 ASSESSMENT — COPD QUESTIONNAIRES: CAT_SEVERITY: MILD

## 2021-04-22 NOTE — DISCHARGE INSTRUCTIONS
UPPER ENDOSCOPY    AFTER THE PROCEDURE    You will return to Same Day Surgery to rest for about an hour before you go home.    The doctor will talk with you and your family.    A family member/friend may visit with you.    You may burp up any air remaining in your stomach.    You may feel dizzy or light-headed from the medicine.    Your nurse will go over the discharge instructions with you and your caregiver and answer any of your questions.      You will be contacted the next day to check on how you are doing.    If biopsies were taken, you will be contacted with the results usually within 3 days.  BACK AT HOME    Rest for an hour or two after you get home.    When your throat is no longer numb and you have a gag reflex, take a few sips of cool water.  If you can swallow comfortably, you may start eating again.    You may have a mild sore throat for the rest of the day.    You will be contacted with results by the surgeons office within a week. If not contacted in one week, call the surgeons office.  WHAT TO WATCH FOR:  Problems rarely occur after the exam, but it is important to be aware of the early signs of a complication.  Call your doctor immediately if you have:    Difficulty swallowing or breathing    Unusual pain in your stomach or chest    Vomiting blood or dark material that looks like coffee grounds    Black or tarry stools    Temperature over 101.5 degrees    MORE QUESTIONS?  Please ask your doctor or nurse before the exam begins  or call your doctor at the clinic.    IF YOU MUST CANCEL YOUR PROCEDURE THE EVENING/NIGHT BEFORE, PLEASE CALL HOSPITAL ADMITTING -[phone] OR TOLL FREE [phone], EXT. [phone].    Phone Numbers:  [facility] - [phone] [phone]  [facility] - [phone]  Surgery Patient Education - [phone] or toll free [phone]    Post-Anesthesia Patient Instructions    IMMEDIATELY FOLLOWING SURGERY:  Do not drive or operate machinery for the first twenty  four hours after surgery.  Do not make any important decisions for twenty four hours after surgery or while taking narcotic pain medications or sedatives.  If you develop intractable nausea and vomiting or a severe headache please notify your doctor immediately.    FOLLOW-UP:  Please make an appointment with your surgeon as instructed. You do not need to follow up with anesthesia unless specifically instructed to do so.    WOUND CARE INSTRUCTIONS (if applicable):  Keep a dry clean dressing on the anesthesia/puncture wound site if there is drainage.  Once the wound has quit draining you may leave it open to air.  Generally you should leave the bandage intact for twenty four hours unless there is drainage.  If the epidural site drains for more than 36-48 hours please call the anesthesia department.    QUESTIONS?:  Please feel free to call your physician or the hospital  if you have any questions, and they will be happy to assist you.

## 2021-04-22 NOTE — ANESTHESIA CARE TRANSFER NOTE
Patient: Mary J Lesch    Procedure(s):  Upper Endoscopy with BIOPSY    Diagnosis: Dysphagia [R13.10]  Gastroesophageal reflux disease without esophagitis [K21.9]  Diagnosis Additional Information: No value filed.    Anesthesia Type:   MAC     Note:      Level of Consciousness: drowsy  Oxygen Supplementation: nasal cannula  Level of Supplemental Oxygen (L/min / FiO2): 2  Independent Airway: airway patency satisfactory and stable  Dentition: dentition unchanged  Vital Signs Stable: post-procedure vital signs reviewed and stable  Report to RN Given: handoff report given  Patient transferred to: Phase II    Handoff Report: Identifed the Patient, Identified the Reponsible Provider, Reviewed the pertinent medical history, Discussed the surgical course, Reviewed Intra-OP anesthesia mangement and issues during anesthesia, Set expectations for post-procedure period and Allowed opportunity for questions and acknowledgement of understanding      Vitals: (Last set prior to Anesthesia Care Transfer)  CRNA VITALS  4/22/2021 1407 - 4/22/2021 1448      4/22/2021             Resp Rate (set):  8        Electronically Signed By: GUZMAN Tadeo CRNA  April 22, 2021  2:48 PM

## 2021-04-22 NOTE — INTERVAL H&P NOTE
I have reviewed the surgical (or preoperative) H&P that is linked to this encounter, and examined the patient. There are no significant changes  
no back pain, no musculoskeletal pain, no neck pain, and no weakness.

## 2021-04-22 NOTE — ANESTHESIA POSTPROCEDURE EVALUATION
Patient: Mary J Lesch    Procedure(s):  Upper Endoscopy with BIOPSY    Diagnosis:Dysphagia [R13.10]  Gastroesophageal reflux disease without esophagitis [K21.9]  Diagnosis Additional Information: No value filed.    Anesthesia Type:  MAC    Note:  Disposition: Outpatient   Postop Pain Control: Uneventful            Sign Out: Well controlled pain   PONV: No   Neuro/Psych: Uneventful            Sign Out: Acceptable/Baseline neuro status   Airway/Respiratory: Uneventful            Sign Out: Acceptable/Baseline resp. status   CV/Hemodynamics: Uneventful            Sign Out: Acceptable CV status; No obvious hypovolemia; No obvious fluid overload   Other NRE: NONE   DID A NON-ROUTINE EVENT OCCUR? No           Last vitals:  Vitals:    04/22/21 1322 04/22/21 1441 04/22/21 1446   BP: 120/61 129/56 129/56   Pulse: 68 80 80   Resp: 18 16 16   Temp: 97.9  F (36.6  C)     SpO2: 98% 94% 94%       Last vitals prior to Anesthesia Care Transfer:  CRNA VITALS  4/22/2021 1407 - 4/22/2021 1450      4/22/2021             Resp Rate (set):  8          Electronically Signed By: GUZMAN Tadeo CRNA  April 22, 2021  2:50 PM

## 2021-04-22 NOTE — OR NURSING
Patient and responsible adult given discharge instructions with no questions regarding instructions. Espinoza score 18. Pain level 0/10.  Discharged from unit via ambulatory. Patient discharged to home.

## 2021-04-22 NOTE — OP NOTE
REPORT OF OPERATION  DATE OF PROCEDURE: 4/22/2021    PATIENT: Mary J Lesch    SURGERY PREFORMED: Esophagogastroduodenoscopy with biopsies     PREOPERATIVE DIAGNOSIS: Dysphagia    POSTOPERATIVE DIAGNOSIS:    Normal Esophagogastroduodenoscopy   Squamous columnar junction at 40    SURGEON: Juan Alonso MD    ASSISTANTS: None    ANESTHESIA: Monitored Anesthesia Care    COMPLICATIONS: None apparent    TRANSFUSIONS: None    TISSUE TO PATHOLOGY: Duodenal, Antral and Distal Esophageal    FINDINGS: Normal Esophagogastroduodenoscopy    INDICATIONS: This is a 65 year old female in need of an Esophagogastroduodenoscopy for Dysphagia.  The patient will be taken to the endoscopy suite for that procedure.    DESCRIPTIONS OF PROCEDURE IN DETAIL: After consent was obtained the patient was taken to the operative suite and nancy in the left lateral decubitus position.  The patient was identified and the correct patient was confirmed. Monitored Anesthesia Care was given by anesthesia. A time out was preformed verifying the correct patient and the correct procedure.  The entire operative team was in agreement.  All necessary equipment and supplies were in the room.    The endoscope was inserted into the mouth and passed without difficulty to the third portion of the duodenum.  Duodenal biopsies were taken.  The endoscope was then withdrawn through the duodenum, the duodenal bulb and pyloric channel and no abnormalities were noted.  The endoscope was brought back into the stomach and antral biopsies were obtained.  The endoscope was then retroflexed and no lesions of the fundus body or antrum were seen.  The endoscope was straightened back out and brought into the distal esophagus and a well-defined squamocolumnar junction was identified at 40 cm. Biopsies of the distal esophagus were taken.  The endoscope was slowly withdrawn through the remaining esophagus no other abnormalities are seen,  The endoscope was withdrawn from the  patient the patient was then taken to the recovery room in stable condition tolerated the procedure well.

## 2021-04-27 LAB — COPATH REPORT: NORMAL

## 2021-05-03 ENCOUNTER — OFFICE VISIT (OUTPATIENT)
Dept: OTOLARYNGOLOGY | Facility: OTHER | Age: 66
End: 2021-05-03
Attending: NURSE PRACTITIONER
Payer: COMMERCIAL

## 2021-05-03 VITALS
SYSTOLIC BLOOD PRESSURE: 130 MMHG | TEMPERATURE: 98.1 F | DIASTOLIC BLOOD PRESSURE: 62 MMHG | BODY MASS INDEX: 34.72 KG/M2 | OXYGEN SATURATION: 99 % | HEIGHT: 71 IN | HEART RATE: 79 BPM | WEIGHT: 248 LBS

## 2021-05-03 DIAGNOSIS — R09.82 POST-NASAL DRIP: ICD-10-CM

## 2021-05-03 DIAGNOSIS — J30.2 SEASONAL ALLERGIC RHINITIS, UNSPECIFIED TRIGGER: Primary | ICD-10-CM

## 2021-05-03 DIAGNOSIS — R47.02 DYSPHASIA: ICD-10-CM

## 2021-05-03 DIAGNOSIS — K22.4 ESOPHAGEAL DYSMOTILITY: ICD-10-CM

## 2021-05-03 DIAGNOSIS — K14.6 BURNING MOUTH SYNDROME: ICD-10-CM

## 2021-05-03 PROCEDURE — G0463 HOSPITAL OUTPT CLINIC VISIT: HCPCS

## 2021-05-03 PROCEDURE — 99213 OFFICE O/P EST LOW 20 MIN: CPT | Performed by: NURSE PRACTITIONER

## 2021-05-03 RX ORDER — MONTELUKAST SODIUM 10 MG/1
10 TABLET ORAL AT BEDTIME
Qty: 90 TABLET | Refills: 3 | Status: SHIPPED | OUTPATIENT
Start: 2021-05-03 | End: 2022-05-20

## 2021-05-03 RX ORDER — PREDNISOLONE 15 MG/5 ML
SOLUTION, ORAL ORAL
Qty: 300 ML | Refills: 1 | Status: SHIPPED | OUTPATIENT
Start: 2021-05-03 | End: 2022-04-25

## 2021-05-03 ASSESSMENT — PAIN SCALES - GENERAL: PAINLEVEL: MILD PAIN (3)

## 2021-05-03 ASSESSMENT — MIFFLIN-ST. JEOR: SCORE: 1756.05

## 2021-05-03 NOTE — NURSING NOTE
"Chief Complaint   Patient presents with     Mouth Problem     4 wk f/u Oral Dryness       Initial /62 (BP Location: Right arm, Patient Position: Sitting, Cuff Size: Adult Large)   Pulse 79   Temp 98.1  F (36.7  C) (Tympanic)   Ht 1.803 m (5' 11\")   Wt 112.5 kg (248 lb)   SpO2 99%   BMI 34.59 kg/m   Estimated body mass index is 34.59 kg/m  as calculated from the following:    Height as of this encounter: 1.803 m (5' 11\").    Weight as of this encounter: 112.5 kg (248 lb).  Medication Reconciliation: complete  Tati Houser LPN    "

## 2021-05-03 NOTE — LETTER
5/3/2021         RE: Mary J Lesch  [address] [facility] [address]        Dear Colleague,    Thank you for referring your patient, Mary J Lesch, to the [facility]. Please see a copy of my visit note below.    Otolaryngology Note         Chief Complaint:     Patient presents with:  Mouth Problem: 4 wk f/u Oral Dryness           History of Present Illness:     Mary J Lesch is a 65 year old female seen today for follow up oral dryness, tongue irritation, fluctuation in hearing, post nasal drip, disordered esophageal motility.      She reports that her tongue has been doing well as long as she avoids exacerbating foods.  She has been drinking plenty of fluids, avoiding exacerbating foods, and using natural toothpaste.  She drinks adequate water.  She uses prednisolone swish and spit as needed for flair in tongue irritation.      She reports that her ears have been doing well.  She has not had any further flux hearing.  She feels like her ears are plugged off and on, usually with allergy symptoms.  She has hearing aids that are 7 years old.  She has not noted any changes in hearing since last visit.      She has continued with occasional dysphagia.  EGD completed 4/22/21 normal per report.   She has a follow up with Gen surgery in 2 days, will see what recommendations they have for esophageal dysmotility.  Previous flexible laryngoscopy was negative for mass or abnormality.      Esophagram completed 3/15/21 showed disordered esophageal motility in the distal half of the esophagus with normal swallowing.      Post nasal drip is well controlled at this time.  No concerns for recurrent cough, frequent throat clearing.  She reports astelin has improved symptoms     She has a history of COPD, currently on symbicort, feels that symptoms are well controlled most of the time.  She was previously on singulair and did well.  We will restart that today.      We discussed possible repeat allergy  testing, she is not interested in this at this time but will consider in the future.           Medications:     Current Outpatient Rx   Medication Sig Dispense Refill     ammonium lactate (AMLACTIN) 12 % cream Apply topically 2 times daily as needed for dry skin        ascorbic acid (VITAMIN C) 500 MG tablet Take 500 mg by mouth daily        atorvastatin (LIPITOR) 80 MG tablet Take 80 mg by mouth daily       azelastine (ASTELIN) 0.1 % nasal spray Spray 2 sprays into both nostrils 2 times daily 30 mL 1     betamethasone dipropionate (DIPROSONE) 0.05 % external cream Apply topically 2 times daily       bisacodyl (DULCOLAX) 5 MG EC tablet Take 5 mg by mouth daily as needed for constipation       budesonide (PULMICORT) 0.5 MG/2ML neb solution Mix 240 ml vishal med sinus rinse, add 0.5 mg budesonide, rinse 1/2 bottle in each nostril twice daily 30 mL 4     Budesonide-Formoterol Fumarate (SYMBICORT IN) Inhale 2 puffs into the lungs daily as needed        cholecalciferol (VITAMIN  -D) 1000 UNITS capsule Take 1 capsule by mouth daily       clonazePAM (KLONOPIN) 0.5 MG tablet Take 0.25 mg by mouth 2 times daily        EPINEPHrine (ANY BX GENERIC EQUIV) 0.3 MG/0.3ML injection 2-pack Inject 0.3 mg into the muscle as needed for anaphylaxis       famotidine (PEPCID) 20 MG tablet Take 20 mg by mouth At Bedtime       gabapentin (NEURONTIN) 600 MG tablet Take 600 mg by mouth 2 times daily       guaiFENesin (MUCINEX) 600 MG 12 hr tablet Take 1,200 mg by mouth 2 times daily       levocetirizine (XYZAL) 5 MG tablet Take 5 mg by mouth every evening       levothyroxine (SYNTHROID/LEVOTHROID) 100 MCG tablet Take 100 mcg by mouth daily       levothyroxine (SYNTHROID/LEVOTHROID) 88 MCG tablet Take 1 tablet by mouth daily        lidocaine (LIDODERM) 5 % patch Place 2 patches onto the skin every 24 hours To prevent lidocaine toxicity, patient should be patch free for 12 hrs daily.       linaclotide (LINZESS) 145 MCG capsule Take 145 mcg by  mouth every morning (before breakfast)       meclizine (ANTIVERT) 25 MG tablet Take 1 tablet by mouth 3 times daily        metFORMIN (GLUCOPHAGE-XR) 500 MG 24 hr tablet Take 1 tablet by mouth daily       methylphenidate (RITALIN) 20 MG tablet Take 30 mg by mouth 2 times daily        montelukast (SINGULAIR) 10 MG tablet Take 1 tablet (10 mg) by mouth At Bedtime 90 tablet 3     montelukast (SINGULAIR) 10 MG tablet Take 10 mg by mouth At Bedtime       Multiple Vitamins-Minerals (CEROVITE SENIOR PO) Take 1 tablet by mouth daily       naproxen sodium (ANAPROX) 220 MG tablet Take 220 mg by mouth 2 times daily (with meals)       omeprazole (PRILOSEC) 40 MG DR capsule Take 40 mg by mouth daily       oxybutynin ER (DITROPAN XL) 15 MG 24 hr tablet Take 15 mg by mouth daily       polyethylene glycol-propylene glycol (SYSTANE ULTRA) 0.4-0.3 % SOLN ophthalmic solution Place 1 drop into both eyes 4 times daily as needed for dry eyes       prednisoLONE (ORAPRED/PRELONE) 15 MG/5ML solution Rinse and spit 15 ml twice daily x 10 days 300 mL 1     solifenacin (VESICARE) 10 MG tablet Take 10 mg by mouth daily        tiZANidine (ZANAFLEX) 4 MG capsule Take 4 mg by mouth 3 times daily as needed for muscle spasms       traZODone (DESYREL) 100 MG tablet Take 1 tablet (100 mg) by mouth At Bedtime Take 2 at bedtime (Patient taking differently: Take 100 mg by mouth At Bedtime ) 90 tablet      venlafaxine (EFFEXOR) 37.5 MG tablet Take 37.5 mg by mouth daily       venlafaxine (EFFEXOR-XR) 150 MG 24 hr capsule Take 150 mg by mouth daily              Allergies:     Allergies: Strawberry, Aspirin, and No clinical screening - see comments          Past Medical History:     Past Medical History:   Diagnosis Date     Presence of both artificial knee joints     6/10/2016            Past Surgical History:     Past Surgical History:   Procedure Laterality Date     APPENDECTOMY       ARTHROPLASTY KNEE BILATERAL       CA ANESTH,SHOULDER REPLACEMENT Left   "    CHOLECYSTECTOMY       COLONOSCOPY       COLONOSCOPY N/A 3/7/2019    Procedure: DIAGNOSTIC COLONOSCOPY;  Surgeon: Thor Spencer MD;  Location: [facility] OR     COLONOSCOPY - HIM SCAN  10/07/2013    Colonoscopy with internal hemorrhoidectomy with Dr. Adela Marshall at [facility] [place] - 10 year repeat recommended  10/2013     ESOPHAGOSCOPY, GASTROSCOPY, DUODENOSCOPY (EGD), DILATATION, COMBINED N/A 4/22/2021    Procedure: Upper Endoscopy with BIOPSY;  Surgeon: Juan Alonso MD;  Location: [facility] OR     HYSTERECTOMY       REPAIR TENDON ACHILLES      x4       ENT family history reviewed         Social History:     Social History     Tobacco Use     Smoking status: Never Smoker     Smokeless tobacco: Never Used   Substance Use Topics     Alcohol use: None     Drug use: None            Review of Systems:     ROS: See HPI         Physical Exam:     /62 (BP Location: Right arm, Patient Position: Sitting, Cuff Size: Adult Large)   Pulse 79   Temp 98.1  F (36.7  C) (Tympanic)   Ht 1.803 m (5' 11\")   Wt 112.5 kg (248 lb)   SpO2 99%   BMI 34.59 kg/m      General - The patient is well nourished and well developed, and appears to have good nutritional status.  Alert and oriented to person and place, answers questions and cooperates with examination appropriately.   Head and Face - Normocephalic and atraumatic, with no gross asymmetry noted.  The facial nerve is intact, with strong symmetric movements.  Voice and Breathing - The patient was breathing comfortably without the use of accessory muscles. There was no wheezing, stridor. The patients voice was clear and strong, and had appropriate pitch and quality.  Ears - External ear normal. Canals are patent. Right tympanic membrane is intact without effusion, retraction or mass. Left tympanic membrane is intact without effusion, retraction or mass.  Eyes - Extraocular movements intact, sclera were not icteric or injected.  Mouth - Examination of the oral cavity showed pink, dry " oral mucosa.   Dentition in good condition. No lesions or ulcerations noted. The tongue was mobile and midline.   Throat - The walls of the oropharynx were smooth, pink, moist, symmetric, and had no lesions or ulcerations.  The tonsillar pillars and soft palate were symmetric. The uvula was midline on elevation.    Neck - Normal midline excursion of the laryngotracheal complex during swallowing.  Full range of motion on passive movement.  Palpation of the occipital, submental, submandibular, internal jugular chain, and supraclavicular nodes did not demonstrate any abnormal lymph nodes or masses.  Palpation of the thyroid was soft and smooth, with no nodules or goiter appreciated.  The trachea was mobile and midline.           Assessment and Plan:       ICD-10-CM    1. 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 Dysphasia  R47.02    4. Esophageal dysmotility  K22.4    5. Post-nasal drip  R09.82      Continue astelin nasal spray  Continue xyzal  Start singulair 10 mg daily at bedtime  Use rinses as needed   Will follow recommendations from surgery for dysphagia/esophageal dysmotility    Teresita DÍAZ  [facility]  4:56 PM  May 3, 2021        Again, thank you for allowing me to participate in the care of your patient.        Sincerely,        Teresita Gavin NP

## 2021-05-03 NOTE — PATIENT INSTRUCTIONS
Continue astelin nasal spray  Continue xyzal  Start singulair 10 mg daily at bedtime  Use rinses as needed   Will follow recommendations from surgery for dysphagia.      Thank you for allowing Teresita DÍAZ and our ENT team to participate in your care.  If your medications are too expensive, please call my nurse at the number listed below.  We can possibly change this medication.    If you have a scheduling or an appointment question please contact our Health Unit Coordinator at their direct line [phone].   ALL nursing questions or concerns can be directed to my Nurse Tati [phone].

## 2021-05-03 NOTE — Clinical Note
Garrett Eubanks, I see you have an appt with Marisol Hurtado on 5/5.  Do you have any recommendations for esophageal dysmotility?  Thanks Kamryn

## 2021-05-03 NOTE — PROGRESS NOTES
Otolaryngology Note         Chief Complaint:     Patient presents with:  Mouth Problem: 4 wk f/u Oral Dryness           History of Present Illness:     Mary J Lesch is a 65 year old female seen today for follow up oral dryness, tongue irritation, fluctuation in hearing, post nasal drip, disordered esophageal motility.      She reports that her tongue has been doing well as long as she avoids exacerbating foods.  She has been drinking plenty of fluids, avoiding exacerbating foods, and using natural toothpaste.  She drinks adequate water.  She uses prednisolone swish and spit as needed for flair in tongue irritation.      She reports that her ears have been doing well.  She has not had any further flux hearing.  She feels like her ears are plugged off and on, usually with allergy symptoms.  She has hearing aids that are 7 years old.  She has not noted any changes in hearing since last visit.      She has continued with occasional dysphagia.  EGD completed 4/22/21 normal per report.   She has a follow up with Gen surgery in 2 days, will see what recommendations they have for esophageal dysmotility.  Previous flexible laryngoscopy was negative for mass or abnormality.      Esophagram completed 3/15/21 showed disordered esophageal motility in the distal half of the esophagus with normal swallowing.      Post nasal drip is well controlled at this time.  No concerns for recurrent cough, frequent throat clearing.  She reports astelin has improved symptoms     She has a history of COPD, currently on symbicort, feels that symptoms are well controlled most of the time.  She was previously on singulair and did well.  We will restart that today.      We discussed possible repeat allergy testing, she is not interested in this at this time but will consider in the future.           Medications:     Current Outpatient Rx   Medication Sig Dispense Refill     ammonium lactate (AMLACTIN) 12 % cream Apply topically 2 times daily as  needed for dry skin        ascorbic acid (VITAMIN C) 500 MG tablet Take 500 mg by mouth daily        atorvastatin (LIPITOR) 80 MG tablet Take 80 mg by mouth daily       azelastine (ASTELIN) 0.1 % nasal spray Spray 2 sprays into both nostrils 2 times daily 30 mL 1     betamethasone dipropionate (DIPROSONE) 0.05 % external cream Apply topically 2 times daily       bisacodyl (DULCOLAX) 5 MG EC tablet Take 5 mg by mouth daily as needed for constipation       budesonide (PULMICORT) 0.5 MG/2ML neb solution Mix 240 ml vishal med sinus rinse, add 0.5 mg budesonide, rinse 1/2 bottle in each nostril twice daily 30 mL 4     Budesonide-Formoterol Fumarate (SYMBICORT IN) Inhale 2 puffs into the lungs daily as needed        cholecalciferol (VITAMIN  -D) 1000 UNITS capsule Take 1 capsule by mouth daily       clonazePAM (KLONOPIN) 0.5 MG tablet Take 0.25 mg by mouth 2 times daily        EPINEPHrine (ANY BX GENERIC EQUIV) 0.3 MG/0.3ML injection 2-pack Inject 0.3 mg into the muscle as needed for anaphylaxis       famotidine (PEPCID) 20 MG tablet Take 20 mg by mouth At Bedtime       gabapentin (NEURONTIN) 600 MG tablet Take 600 mg by mouth 2 times daily       guaiFENesin (MUCINEX) 600 MG 12 hr tablet Take 1,200 mg by mouth 2 times daily       levocetirizine (XYZAL) 5 MG tablet Take 5 mg by mouth every evening       levothyroxine (SYNTHROID/LEVOTHROID) 100 MCG tablet Take 100 mcg by mouth daily       levothyroxine (SYNTHROID/LEVOTHROID) 88 MCG tablet Take 1 tablet by mouth daily        lidocaine (LIDODERM) 5 % patch Place 2 patches onto the skin every 24 hours To prevent lidocaine toxicity, patient should be patch free for 12 hrs daily.       linaclotide (LINZESS) 145 MCG capsule Take 145 mcg by mouth every morning (before breakfast)       meclizine (ANTIVERT) 25 MG tablet Take 1 tablet by mouth 3 times daily        metFORMIN (GLUCOPHAGE-XR) 500 MG 24 hr tablet Take 1 tablet by mouth daily       methylphenidate (RITALIN) 20 MG tablet  Take 30 mg by mouth 2 times daily        montelukast (SINGULAIR) 10 MG tablet Take 1 tablet (10 mg) by mouth At Bedtime 90 tablet 3     montelukast (SINGULAIR) 10 MG tablet Take 10 mg by mouth At Bedtime       Multiple Vitamins-Minerals (CEROVITE SENIOR PO) Take 1 tablet by mouth daily       naproxen sodium (ANAPROX) 220 MG tablet Take 220 mg by mouth 2 times daily (with meals)       omeprazole (PRILOSEC) 40 MG DR capsule Take 40 mg by mouth daily       oxybutynin ER (DITROPAN XL) 15 MG 24 hr tablet Take 15 mg by mouth daily       polyethylene glycol-propylene glycol (SYSTANE ULTRA) 0.4-0.3 % SOLN ophthalmic solution Place 1 drop into both eyes 4 times daily as needed for dry eyes       prednisoLONE (ORAPRED/PRELONE) 15 MG/5ML solution Rinse and spit 15 ml twice daily x 10 days 300 mL 1     solifenacin (VESICARE) 10 MG tablet Take 10 mg by mouth daily        tiZANidine (ZANAFLEX) 4 MG capsule Take 4 mg by mouth 3 times daily as needed for muscle spasms       traZODone (DESYREL) 100 MG tablet Take 1 tablet (100 mg) by mouth At Bedtime Take 2 at bedtime (Patient taking differently: Take 100 mg by mouth At Bedtime ) 90 tablet      venlafaxine (EFFEXOR) 37.5 MG tablet Take 37.5 mg by mouth daily       venlafaxine (EFFEXOR-XR) 150 MG 24 hr capsule Take 150 mg by mouth daily              Allergies:     Allergies: Strawberry, Aspirin, and No clinical screening - see comments          Past Medical History:     Past Medical History:   Diagnosis Date     Presence of both artificial knee joints     6/10/2016            Past Surgical History:     Past Surgical History:   Procedure Laterality Date     APPENDECTOMY       ARTHROPLASTY KNEE BILATERAL       CA ANESTH,SHOULDER REPLACEMENT Left      CHOLECYSTECTOMY       COLONOSCOPY       COLONOSCOPY N/A 3/7/2019    Procedure: DIAGNOSTIC COLONOSCOPY;  Surgeon: Thor Spencer MD;  Location: [facility]     COLONOSCOPY - HIM SCAN  10/07/2013    [place]  [place] [facility] [place]     ESOPHAGOSCOPY, GASTROSCOPY, DUODENOSCOPY (EGD), DILATATION, COMBINED N/A 4/22/2021    Procedure: Upper Endoscopy with BIOPSY;  Surgeon: Juan Alonso MD;  Location: [facility]     HYSTERECTOMY       REPAIR TENDON ACHILLES      x4       ENT family history reviewed         Social History:     Social History     Tobacco Use     Smoking status: Never Smoker     Smokeless tobacco: Never Used   Substance Use Topics     Alcohol use: None     Drug use: None            Review of Systems:     ROS: See HPI         Physical Exam:     /62 (BP Location: Right arm, Patient Position: Sitting, Cuff Size: Adult Large)   Pulse 79   Temp 98.1  F (36.7  C) (Tympanic)   Ht 1.803 m (5' 11\")   Wt 112.5 kg (248 lb)   SpO2 99%   BMI 34.59 kg/m      General - The patient is well nourished and well developed, and appears to have good nutritional status.  Alert and oriented to person and place, answers questions and cooperates with examination appropriately.   Head and Face - Normocephalic and atraumatic, with no gross asymmetry noted.  The facial nerve is intact, with strong symmetric movements.  Voice and Breathing - The patient was breathing comfortably without the use of accessory muscles. There was no wheezing, stridor. The patients voice was clear and strong, and had appropriate pitch and quality.  Ears - External ear normal. Canals are patent. Right tympanic membrane is intact without effusion, retraction or mass. Left tympanic membrane is intact without effusion, retraction or mass.  Eyes - Extraocular movements intact, sclera were not icteric or injected.  Mouth - Examination of the oral cavity showed pink, dry oral mucosa.   Dentition in good condition. No lesions or ulcerations noted. The tongue was mobile and midline.   Throat - The walls of the oropharynx were smooth, pink, moist, symmetric, and had no lesions or ulcerations.  The tonsillar " pillars and soft palate were symmetric. The uvula was midline on elevation.    Neck - Normal midline excursion of the laryngotracheal complex during swallowing.  Full range of motion on passive movement.  Palpation of the occipital, submental, submandibular, internal jugular chain, and supraclavicular nodes did not demonstrate any abnormal lymph nodes or masses.  Palpation of the thyroid was soft and smooth, with no nodules or goiter appreciated.  The trachea was mobile and midline.           Assessment and Plan:       ICD-10-CM    1. 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 Dysphasia  R47.02    4. Esophageal dysmotility  K22.4    5. Post-nasal drip  R09.82      Continue astelin nasal spray  Continue xyzal  Start singulair 10 mg daily at bedtime  Use rinses as needed   Will follow recommendations from surgery for dysphagia/esophageal dysmotility    Teresita DÍAZ  [facility] ENT  4:56 PM  May 3, 2021

## 2021-05-05 ENCOUNTER — OFFICE VISIT (OUTPATIENT)
Dept: SURGERY | Facility: OTHER | Age: 66
End: 2021-05-05
Attending: NURSE PRACTITIONER
Payer: COMMERCIAL

## 2021-05-05 VITALS
SYSTOLIC BLOOD PRESSURE: 128 MMHG | BODY MASS INDEX: 34.87 KG/M2 | TEMPERATURE: 97.4 F | HEART RATE: 86 BPM | OXYGEN SATURATION: 95 % | DIASTOLIC BLOOD PRESSURE: 70 MMHG | WEIGHT: 250 LBS

## 2021-05-05 DIAGNOSIS — R13.10 DYSPHAGIA, UNSPECIFIED TYPE: Primary | ICD-10-CM

## 2021-05-05 DIAGNOSIS — K22.4 ESOPHAGEAL DYSMOTILITY: ICD-10-CM

## 2021-05-05 PROCEDURE — 99212 OFFICE O/P EST SF 10 MIN: CPT | Performed by: NURSE PRACTITIONER

## 2021-05-05 PROCEDURE — G0463 HOSPITAL OUTPT CLINIC VISIT: HCPCS

## 2021-05-05 ASSESSMENT — PAIN SCALES - GENERAL: PAINLEVEL: NO PAIN (0)

## 2021-05-05 NOTE — NURSING NOTE
"Chief Complaint   Patient presents with     Surgical Followup     4/22 upper endoscopy       Initial /70 (BP Location: Left arm, Patient Position: Sitting, Cuff Size: Adult Large)   Pulse 86   Temp 97.4  F (36.3  C) (Tympanic)   Wt 113.4 kg (250 lb)   SpO2 95%   BMI 34.87 kg/m   Estimated body mass index is 34.87 kg/m  as calculated from the following:    Height as of 5/3/21: 1.803 m (5' 11\").    Weight as of this encounter: 113.4 kg (250 lb).  Medication Reconciliation: complete  Desiree John LPN  "

## 2021-05-05 NOTE — PATIENT INSTRUCTIONS
Thank you for allowing Cha Mast CNP and our surgical team to participate in your care. Please call our health unit coordinator at [phone] with scheduling questions or the nurse at [phone] with any other questions or concerns.

## 2021-05-05 NOTE — PROGRESS NOTES
CLINIC NOTE - POST-OP ENDOSCOPY  5/5/2021    Patient:Mary Jean Lesch    Procedure: Esophagogastroduodenoscopy with biopsy    This is a 65 year old female who is 2 weeks s/p Esophagogastroduodenoscopy with biopsy.  The EGD was normal.  The patient continues to have dysphagia which is why she had the EGD.  She did have an esophagram in March which did note disordered esophageal motility in the distal half of the esophagus.    Current Medications:  Current Outpatient Medications   Medication Sig Dispense Refill     ammonium lactate (AMLACTIN) 12 % cream Apply topically 2 times daily as needed for dry skin        ascorbic acid (VITAMIN C) 500 MG tablet Take 500 mg by mouth daily        atorvastatin (LIPITOR) 80 MG tablet Take 80 mg by mouth daily       azelastine (ASTELIN) 0.1 % nasal spray Spray 2 sprays into both nostrils 2 times daily 30 mL 1     betamethasone dipropionate (DIPROSONE) 0.05 % external cream Apply topically 2 times daily       bisacodyl (DULCOLAX) 5 MG EC tablet Take 5 mg by mouth daily as needed for constipation       budesonide (PULMICORT) 0.5 MG/2ML neb solution Mix 240 ml vishal med sinus rinse, add 0.5 mg budesonide, rinse 1/2 bottle in each nostril twice daily 30 mL 4     Budesonide-Formoterol Fumarate (SYMBICORT IN) Inhale 2 puffs into the lungs daily as needed        cholecalciferol (VITAMIN  -D) 1000 UNITS capsule Take 1 capsule by mouth daily       clonazePAM (KLONOPIN) 0.5 MG tablet Take 0.25 mg by mouth 2 times daily        EPINEPHrine (ANY BX GENERIC EQUIV) 0.3 MG/0.3ML injection 2-pack Inject 0.3 mg into the muscle as needed for anaphylaxis       famotidine (PEPCID) 20 MG tablet Take 20 mg by mouth At Bedtime       gabapentin (NEURONTIN) 600 MG tablet Take 600 mg by mouth 2 times daily       guaiFENesin (MUCINEX) 600 MG 12 hr tablet Take 1,200 mg by mouth 2 times daily       levocetirizine (XYZAL) 5 MG tablet Take 5 mg by mouth every evening       levothyroxine (SYNTHROID/LEVOTHROID) 100  MCG tablet Take 100 mcg by mouth daily       levothyroxine (SYNTHROID/LEVOTHROID) 88 MCG tablet Take 1 tablet by mouth daily        lidocaine (LIDODERM) 5 % patch Place 2 patches onto the skin every 24 hours To prevent lidocaine toxicity, patient should be patch free for 12 hrs daily.       linaclotide (LINZESS) 145 MCG capsule Take 145 mcg by mouth every morning (before breakfast)       meclizine (ANTIVERT) 25 MG tablet Take 1 tablet by mouth 3 times daily        metFORMIN (GLUCOPHAGE-XR) 500 MG 24 hr tablet Take 1 tablet by mouth daily       methylphenidate (RITALIN) 20 MG tablet Take 30 mg by mouth 2 times daily        montelukast (SINGULAIR) 10 MG tablet Take 1 tablet (10 mg) by mouth At Bedtime 90 tablet 3     montelukast (SINGULAIR) 10 MG tablet Take 10 mg by mouth At Bedtime       Multiple Vitamins-Minerals (CEROVITE SENIOR PO) Take 1 tablet by mouth daily       naproxen sodium (ANAPROX) 220 MG tablet Take 220 mg by mouth 2 times daily (with meals)       omeprazole (PRILOSEC) 40 MG DR capsule Take 40 mg by mouth daily       oxybutynin ER (DITROPAN XL) 15 MG 24 hr tablet Take 15 mg by mouth daily       polyethylene glycol-propylene glycol (SYSTANE ULTRA) 0.4-0.3 % SOLN ophthalmic solution Place 1 drop into both eyes 4 times daily as needed for dry eyes       prednisoLONE (ORAPRED/PRELONE) 15 MG/5ML solution Rinse and spit 15 ml twice daily x 10 days 300 mL 1     solifenacin (VESICARE) 10 MG tablet Take 10 mg by mouth daily        tiZANidine (ZANAFLEX) 4 MG capsule Take 4 mg by mouth 3 times daily as needed for muscle spasms       traZODone (DESYREL) 100 MG tablet Take 1 tablet (100 mg) by mouth At Bedtime Take 2 at bedtime (Patient taking differently: Take 100 mg by mouth At Bedtime ) 90 tablet      venlafaxine (EFFEXOR) 37.5 MG tablet Take 37.5 mg by mouth daily       venlafaxine (EFFEXOR-XR) 150 MG 24 hr capsule Take 150 mg by mouth daily         Allergies:  Allergies   Allergen Reactions     Strawberry  "Anaphylaxis     Aspirin      No Clinical Screening - See Comments Hives     Trees, dandelions, pussy willows, and flowers, strawberry       PHYSICAL EXAM:   Vital signs: /70 (BP Location: Left arm, Patient Position: Sitting, Cuff Size: Adult Large)   Pulse 86   Temp 97.4  F (36.3  C) (Tympanic)   Wt 113.4 kg (250 lb)   SpO2 95%   BMI 34.87 kg/m     BMI: Body mass index is 34.87 kg/m .   General: Normal, healthy, cooperative, in no acute distress, alert   Lungs: respirations are non-labored   Abdominal: non-distended    PATHOLOGY:  Copath Report   Date Value Ref Range Status   04/22/2021   Final    Patient Name: LESCH, MARY J  MR#: [id number]  Specimen #:   Collected: 4/22/2021  Received: 4/26/2021  Reported: 4/27/2021 13:15  Ordering Phy(s): RAMON GUERRERO    For improved result formatting, select 'View Enhanced Report Format' under   Linked Documents section.    SPECIMEN(S):  A: Duodenal biopsy  B: Antral biopsy  C: Esophageal biopsy, distal    FINAL DIAGNOSIS:  A: Small intestine (duodenum), biopsy:  - Small bowel mucosa without diagnostic abnormality.  - No diagnostic histologic features of celiac disease/gluten-sensitive   enteropathy.    B: Stomach, antrum, biopsy:  - Gastric antrum mucosa without diagnostic abnormality.  - Negative for Helicobacter pylori.    C: Esophagus, distal, biopsy:  - Esophageal squamous mucosa without diagnostic abnormality.  - Negative for intraepithelial eosinophils.    I have personally reviewed all specimens and/or slides, including the   listed special stains, and used them  with my medical judgement to determine or confirm the final diagnosis.    Electronically signed out by:    Russell Harvey M.D.    GROSS:  A. The specimen is received in formalin with the proper patient   identification, labeled \"duodenum\". The  specimen consists of a 0.3 cm mucosal fragment, entirely submitted in one   cassette.    B. The specimen is received in formalin with the " "proper patient   identification, labeled \"stomach, antrum\". The  specimen consists of a 0.5 cm mucosal fragment, entirely submitted in one   cassette.    C. The specimen is received in formalin with the proper patient   identification, labeled \"distal esophagus\".  The specimen consists of a 0.5 cm tan-white mucosal fragment, entirely   submitted in one cassette. (Dictated  by: SANTY Kovacs 4/26/2021 12:04 PM)    MICROSCOPIC:  A: Sections demonstrate small bowel mucosa without histologic abnormality.    There is no significant increase  in intraepithelial lymphocytes.  Where the villi are well-oriented, there   is no villous blunting to suggest  celiac disease/gluten-sensitive enteropathy.    B: Sections demonstrate gastric antrum mucosa without histologic   abnormality.  There are no Helicobacter  pylori identified on H&E.    C: Sections demonstrate esophageal squamous mucosa without histologic   abnormality or increased intraepithelial  eosinophils.    CPT Codes  A: 97347-IO3  B: 86384-VJ5  C: 01166-PQ3    COLLECTION SITE:  Client: [facility]  Location: [facility] (B)    The technical component of this testing was completed at the [facility], with the professional component completed   at the [facility], [address] [place] [id number] ([phone])           ASSESSMENT:    65 year old female who is 2 weeks s/p Esophagogastroduodenoscopy with biopsy.  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  She at this time will work on her swallowing technique and with follow up with worsening symptoms or problems/concerns.      Recommend follow-up Esophagogastroduodenoscopy as needed in the future with problems.   "

## 2021-05-14 ENCOUNTER — MEDICAL CORRESPONDENCE (OUTPATIENT)
Dept: INTERVENTIONAL RADIOLOGY/VASCULAR | Facility: HOSPITAL | Age: 66
End: 2021-05-14

## 2021-05-18 ENCOUNTER — DOCUMENTATION ONLY (OUTPATIENT)
Dept: INTERVENTIONAL RADIOLOGY/VASCULAR | Facility: HOSPITAL | Age: 66
End: 2021-05-18

## 2021-05-18 NOTE — PROGRESS NOTES
Inquired with pharmacy regarding patients order for prednisolone swish and spit BID.  Per pharmacy, Rekha, there is little documentation regarding how much steroid is absorbed, but her feeling is that it is a minimal amount of steroid absorbed.  Patient called and message left ([phone]) regarding whether she is actively taking the swish and spit.

## 2021-05-20 ENCOUNTER — TELEPHONE (OUTPATIENT)
Dept: INTERVENTIONAL RADIOLOGY/VASCULAR | Facility: HOSPITAL | Age: 66
End: 2021-05-20

## 2021-05-20 NOTE — TELEPHONE ENCOUNTER
Patient is on an antibiotic transferred her to DI nurse to inform her of when she can be rescheduled for steroid injection.

## 2021-06-16 ENCOUNTER — HOSPITAL ENCOUNTER (OUTPATIENT)
Facility: HOSPITAL | Age: 66
Discharge: HOME | End: 2021-06-16
Attending: RADIOLOGY | Admitting: RADIOLOGY
Payer: COMMERCIAL

## 2021-06-16 ENCOUNTER — HOSPITAL ENCOUNTER (OUTPATIENT)
Dept: INTERVENTIONAL RADIOLOGY/VASCULAR | Facility: HOSPITAL | Age: 66
Discharge: HOME | End: 2021-06-16
Attending: PAIN MEDICINE | Admitting: PAIN MEDICINE
Payer: COMMERCIAL

## 2021-06-16 DIAGNOSIS — M47.816 SPONDYLOSIS WITHOUT MYELOPATHY OR RADICULOPATHY, LUMBAR REGION: ICD-10-CM

## 2021-06-16 DIAGNOSIS — M54.16 RADICULOPATHY, LUMBAR REGION: ICD-10-CM

## 2021-06-16 PROCEDURE — C1751 CATH, INF, PER/CENT/MIDLINE: HCPCS

## 2021-06-16 PROCEDURE — 250N000011 HC RX IP 250 OP 636: Performed by: RADIOLOGY

## 2021-06-16 RX ORDER — METHYLPREDNISOLONE ACETATE 80 MG/ML
80 INJECTION, SUSPENSION INTRA-ARTICULAR; INTRALESIONAL; INTRAMUSCULAR; SOFT TISSUE ONCE
Status: COMPLETED | OUTPATIENT
Start: 2021-06-16 | End: 2021-06-16

## 2021-06-16 RX ORDER — IOPAMIDOL 612 MG/ML
15 INJECTION, SOLUTION INTRATHECAL ONCE
Status: CANCELLED | OUTPATIENT
Start: 2021-06-16 | End: 2021-06-16

## 2021-06-16 RX ORDER — METHYLPREDNISOLONE ACETATE 80 MG/ML
80 INJECTION, SUSPENSION INTRA-ARTICULAR; INTRALESIONAL; INTRAMUSCULAR; SOFT TISSUE ONCE
Status: CANCELLED | OUTPATIENT
Start: 2021-06-16 | End: 2021-06-16

## 2021-06-16 RX ORDER — LIDOCAINE HYDROCHLORIDE 10 MG/ML
5 INJECTION, SOLUTION EPIDURAL; INFILTRATION; INTRACAUDAL; PERINEURAL ONCE
Status: CANCELLED | OUTPATIENT
Start: 2021-06-16 | End: 2021-06-16

## 2021-06-16 RX ORDER — LIDOCAINE HYDROCHLORIDE 10 MG/ML
5 INJECTION, SOLUTION EPIDURAL; INFILTRATION; INTRACAUDAL; PERINEURAL ONCE
Status: DISCONTINUED | OUTPATIENT
Start: 2021-06-16 | End: 2021-06-17 | Stop reason: HOSPADM

## 2021-06-16 RX ORDER — IOPAMIDOL 612 MG/ML
15 INJECTION, SOLUTION INTRATHECAL ONCE
Status: COMPLETED | OUTPATIENT
Start: 2021-06-16 | End: 2021-06-16

## 2021-06-16 RX ADMIN — IOPAMIDOL 4 ML: 612 INJECTION, SOLUTION INTRATHECAL at 10:35

## 2021-06-16 RX ADMIN — METHYLPREDNISOLONE ACETATE 80 MG: 80 INJECTION, SUSPENSION INTRA-ARTICULAR; INTRALESIONAL; INTRAMUSCULAR; SOFT TISSUE at 10:36

## 2021-06-16 NOTE — IP AVS SNAPSHOT
[facility]  [address] [address]  Phone: [phone]  Fax: [phone]                                    After Visit Summary   6/16/2021    Mary Jean Lesch    MRN: [id number]           After Visit Summary Signature Page    I have received my discharge instructions, and my questions have been answered. I have discussed any challenges I see with this plan with the nurse or doctor.    ..........................................................................................................................................  Patient/Patient Representative Signature      ..........................................................................................................................................  Patient Representative Print Name and Relationship to Patient    ..................................................               ................................................  Date                                   Time    ..........................................................................................................................................  Reviewed by Signature/Title    ...................................................              ..............................................  Date                                               Time          22EPIC Rev 08/18

## 2021-06-18 ENCOUNTER — OFFICE VISIT (OUTPATIENT)
Dept: PODIATRY | Facility: OTHER | Age: 66
End: 2021-06-18
Attending: PODIATRIST
Payer: COMMERCIAL

## 2021-06-18 VITALS
SYSTOLIC BLOOD PRESSURE: 179 MMHG | OXYGEN SATURATION: 97 % | TEMPERATURE: 97.5 F | HEART RATE: 92 BPM | DIASTOLIC BLOOD PRESSURE: 82 MMHG

## 2021-06-18 DIAGNOSIS — R20.8 LOSS OF PROTECTIVE SENSATION OF SKIN OF DEFORMED FOOT: ICD-10-CM

## 2021-06-18 DIAGNOSIS — M21.969 LOSS OF PROTECTIVE SENSATION OF SKIN OF DEFORMED FOOT: ICD-10-CM

## 2021-06-18 DIAGNOSIS — E11.42 DIABETIC POLYNEUROPATHY ASSOCIATED WITH TYPE 2 DIABETES MELLITUS (H): ICD-10-CM

## 2021-06-18 DIAGNOSIS — E11.9 DIABETES MELLITUS TYPE 2, NONINSULIN DEPENDENT (H): ICD-10-CM

## 2021-06-18 DIAGNOSIS — M20.42 HAMMER TOE OF LEFT FOOT: ICD-10-CM

## 2021-06-18 DIAGNOSIS — M79.671 RIGHT FOOT PAIN: ICD-10-CM

## 2021-06-18 DIAGNOSIS — S92.514A CLOSED NONDISPLACED FRACTURE OF PROXIMAL PHALANX OF LESSER TOE OF RIGHT FOOT, INITIAL ENCOUNTER: ICD-10-CM

## 2021-06-18 DIAGNOSIS — G57.61 LESION OF RIGHT PLANTAR NERVE: Primary | ICD-10-CM

## 2021-06-18 DIAGNOSIS — L60.3 ONYCHODYSTROPHY: ICD-10-CM

## 2021-06-18 DIAGNOSIS — M79.674 TOE PAIN, RIGHT: ICD-10-CM

## 2021-06-18 DIAGNOSIS — L84 CALLUS OF FOOT: ICD-10-CM

## 2021-06-18 PROCEDURE — 11721 DEBRIDE NAIL 6 OR MORE: CPT | Performed by: PODIATRIST

## 2021-06-18 PROCEDURE — 20600 DRAIN/INJ JOINT/BURSA W/O US: CPT | Performed by: PODIATRIST

## 2021-06-18 PROCEDURE — 99213 OFFICE O/P EST LOW 20 MIN: CPT | Mod: 25 | Performed by: PODIATRIST

## 2021-06-18 PROCEDURE — G0463 HOSPITAL OUTPT CLINIC VISIT: HCPCS | Mod: 25

## 2021-06-18 RX ORDER — DEXAMETHASONE SODIUM PHOSPHATE 4 MG/ML
4 INJECTION, SOLUTION INTRA-ARTICULAR; INTRALESIONAL; INTRAMUSCULAR; INTRAVENOUS; SOFT TISSUE ONCE
Status: COMPLETED | OUTPATIENT
Start: 2021-06-18 | End: 2021-06-18

## 2021-06-18 RX ADMIN — DEXAMETHASONE SODIUM PHOSPHATE 4 MG: 4 INJECTION, SOLUTION INTRA-ARTICULAR; INTRALESIONAL; INTRAMUSCULAR; INTRAVENOUS; SOFT TISSUE at 14:30

## 2021-06-18 RX ADMIN — DEXAMETHASONE SODIUM PHOSPHATE 4 MG: 4 INJECTION, SOLUTION INTRA-ARTICULAR; INTRALESIONAL; INTRAMUSCULAR; INTRAVENOUS; SOFT TISSUE at 14:31

## 2021-06-18 ASSESSMENT — PAIN SCALES - GENERAL: PAINLEVEL: SEVERE PAIN (6)

## 2021-06-18 NOTE — PATIENT INSTRUCTIONS
-Diabetic Foot Education:  ---Check the feet daily looking for new new blisters or wounds  ---Wear shoes at all times when walking including around the house  ---Avoid lotion application between the toes.   ---If you have any open wounds with signs of infection (redness, swelling, pain, purulence, fever, chills, nausea, vomiting), go to the Emergency Department as soon as possible.    Thank you for allowing  and our Podiatry team to participate in your care. Please call our office at [phone] with scheduling questions or with any other questions or concerns.
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
## 2021-06-18 NOTE — PROGRESS NOTES
Clinic Administered Medication Documentation      Injectable Medication Documentation    Patient was given Dexamethasone Sodium Phosphate . Prior to medication administration, verified patients identity using patient s name and date of birth. Please see MAR and medication order for additional information. Patient instructed to report any adverse reaction to staff immediately .      Was entire vial of medication used? Yes  Vial/Syringe: Single dose vial  Expiration Date:  5/2022  Was this medication supplied by the patient? No     Clinic Administered Medication Documentation      Injectable Medication Documentation    Patient was given Dexamethasone Sodium Phosphate . Prior to medication administration, verified patients identity using patient s name and date of birth. Please see MAR and medication order for additional information. Patient instructed to report any adverse reaction to staff immediately .      Was entire vial of medication used? Yes  Vial/Syringe: Single dose vial  Expiration Date:  5/2022  Was this medication supplied by the patient? No

## 2021-06-18 NOTE — NURSING NOTE
"Chief Complaint   Patient presents with     DFE       Initial BP (!) 179/82   Pulse 92   Temp 97.5  F (36.4  C)   SpO2 97%  Estimated body mass index is 34.87 kg/m  as calculated from the following:    Height as of 5/3/21: 1.803 m (5' 11\").    Weight as of 5/5/21: 113.4 kg (250 lb).  Medication Reconciliation: complete  Katalina Mccoy MA  "

## 2021-06-29 ENCOUNTER — TELEPHONE (OUTPATIENT)
Dept: INTERVENTIONAL RADIOLOGY/VASCULAR | Facility: HOSPITAL | Age: 66
End: 2021-06-29

## 2021-06-29 NOTE — TELEPHONE ENCOUNTER
INJECTION POST CALL    Procedure: Epidural TL L4-5  Radiologist(s): Dr. Jasiel Benitez  Date of Procedure:  6-16-21    Relief of pain from this injection    A = 90%  A- = 85%  B = 80%  B- =75%  C = 70%  C- = 65%  D = 60%  D- = 50%  F < 50%    Do you feel this injection was beneficial? No  If yes, how long did it last? After the Lidocaine wore off pain was back to what it was prior    Where is the pain located?  Across the low back and mainly into the left hip, down entire left leg stopping at the knee. Sometimes pain in right hip.  Can you describe the pain? Sore and an ache.    Does the pain radiate anywhere? yes  If yes, where does the pain stop? Left knee    Is this new pain? No      The patient had no questions or concerns.    Instruct patient to follow up with their provider for any further care they may need. (as Dr. Benitez will no longer be making recommendations)    Lexi Cerrato

## 2021-08-20 ENCOUNTER — OFFICE VISIT (OUTPATIENT)
Dept: PODIATRY | Facility: OTHER | Age: 66
End: 2021-08-20
Attending: PODIATRIST
Payer: COMMERCIAL

## 2021-08-20 VITALS
HEIGHT: 71 IN | DIASTOLIC BLOOD PRESSURE: 78 MMHG | WEIGHT: 250 LBS | TEMPERATURE: 97.9 F | BODY MASS INDEX: 35 KG/M2 | HEART RATE: 93 BPM | SYSTOLIC BLOOD PRESSURE: 142 MMHG | OXYGEN SATURATION: 98 %

## 2021-08-20 DIAGNOSIS — M20.41 ACQUIRED HAMMER TOE DEFORMITY OF LESSER TOE OF BOTH FEET: ICD-10-CM

## 2021-08-20 DIAGNOSIS — G57.61 LESION OF RIGHT PLANTAR NERVE: Primary | ICD-10-CM

## 2021-08-20 DIAGNOSIS — L60.3 ONYCHODYSTROPHY: ICD-10-CM

## 2021-08-20 DIAGNOSIS — L84 CALLUS OF FOOT: ICD-10-CM

## 2021-08-20 DIAGNOSIS — E11.42 DIABETIC POLYNEUROPATHY ASSOCIATED WITH TYPE 2 DIABETES MELLITUS (H): ICD-10-CM

## 2021-08-20 DIAGNOSIS — M20.42 HAMMER TOE OF LEFT FOOT: ICD-10-CM

## 2021-08-20 DIAGNOSIS — M20.42 ACQUIRED HAMMER TOE DEFORMITY OF LESSER TOE OF BOTH FEET: ICD-10-CM

## 2021-08-20 DIAGNOSIS — M20.5X1 ACQUIRED ADDUCTOVARUS ROTATION OF TOE OF RIGHT FOOT: ICD-10-CM

## 2021-08-20 DIAGNOSIS — M21.969 LOSS OF PROTECTIVE SENSATION OF SKIN OF DEFORMED FOOT: ICD-10-CM

## 2021-08-20 DIAGNOSIS — E11.9 DIABETES MELLITUS TYPE 2, NONINSULIN DEPENDENT (H): ICD-10-CM

## 2021-08-20 DIAGNOSIS — M20.5X2 ACQUIRED ADDUCTOVARUS ROTATION OF TOE OF LEFT FOOT: ICD-10-CM

## 2021-08-20 DIAGNOSIS — R20.8 LOSS OF PROTECTIVE SENSATION OF SKIN OF DEFORMED FOOT: ICD-10-CM

## 2021-08-20 DIAGNOSIS — M79.674 TOE PAIN, RIGHT: ICD-10-CM

## 2021-08-20 PROCEDURE — G0463 HOSPITAL OUTPT CLINIC VISIT: HCPCS

## 2021-08-20 PROCEDURE — 99213 OFFICE O/P EST LOW 20 MIN: CPT | Mod: 25 | Performed by: PODIATRIST

## 2021-08-20 PROCEDURE — 20600 DRAIN/INJ JOINT/BURSA W/O US: CPT | Mod: RT | Performed by: PODIATRIST

## 2021-08-20 PROCEDURE — G0463 HOSPITAL OUTPT CLINIC VISIT: HCPCS | Mod: 25

## 2021-08-20 RX ORDER — KETOROLAC TROMETHAMINE 30 MG/ML
60 INJECTION, SOLUTION INTRAMUSCULAR; INTRAVENOUS ONCE
Status: COMPLETED | OUTPATIENT
Start: 2021-08-20 | End: 2021-08-20

## 2021-08-20 RX ADMIN — KETOROLAC TROMETHAMINE 60 MG: 60 INJECTION, SOLUTION INTRAMUSCULAR at 14:45

## 2021-08-20 ASSESSMENT — PAIN SCALES - GENERAL: PAINLEVEL: SEVERE PAIN (6)

## 2021-08-20 ASSESSMENT — MIFFLIN-ST. JEOR: SCORE: 1760.12
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
## 2021-08-20 NOTE — NURSING NOTE
"Chief Complaint   Patient presents with     DFE       Initial BP (!) 142/78   Pulse 93   Temp 97.9  F (36.6  C) (Tympanic)   Ht 1.803 m (5' 11\")   Wt 113.4 kg (250 lb)   SpO2 98%   BMI 34.87 kg/m   Estimated body mass index is 34.87 kg/m  as calculated from the following:    Height as of this encounter: 1.803 m (5' 11\").    Weight as of this encounter: 113.4 kg (250 lb).  Medication Reconciliation: complete  Ailyn Will LPN    "

## 2021-08-20 NOTE — PROGRESS NOTES
Clinic Administered Medication Documentation    Administrations This Visit     ketorolac (TORADOL) injection 60 mg     Admin Date  08/20/2021 Action  Given Dose  60 mg Route  Intramuscular Site   Administered By  Gladys Vasquez LPN    Ordering Provider: Lexi Felix DPM    Patient Supplied?: No                Wasted 30 mg of 60 mg for right foot injection of two joints.

## 2021-10-18 ENCOUNTER — OFFICE VISIT (OUTPATIENT)
Dept: PODIATRY | Facility: OTHER | Age: 66
End: 2021-10-18
Attending: PODIATRIST
Payer: COMMERCIAL

## 2021-10-18 VITALS
HEART RATE: 92 BPM | SYSTOLIC BLOOD PRESSURE: 130 MMHG | TEMPERATURE: 97.9 F | OXYGEN SATURATION: 93 % | DIASTOLIC BLOOD PRESSURE: 83 MMHG

## 2021-10-18 DIAGNOSIS — M20.42 HAMMER TOE OF LEFT FOOT: ICD-10-CM

## 2021-10-18 DIAGNOSIS — M20.42 ACQUIRED HAMMER TOE DEFORMITY OF LESSER TOE OF BOTH FEET: ICD-10-CM

## 2021-10-18 DIAGNOSIS — L60.3 ONYCHODYSTROPHY: ICD-10-CM

## 2021-10-18 DIAGNOSIS — E11.9 DIABETES MELLITUS TYPE 2, NONINSULIN DEPENDENT (H): ICD-10-CM

## 2021-10-18 DIAGNOSIS — M20.41 ACQUIRED HAMMER TOE DEFORMITY OF LESSER TOE OF BOTH FEET: ICD-10-CM

## 2021-10-18 DIAGNOSIS — M20.5X2 ACQUIRED ADDUCTOVARUS ROTATION OF TOE OF LEFT FOOT: ICD-10-CM

## 2021-10-18 DIAGNOSIS — M79.674 TOE PAIN, RIGHT: ICD-10-CM

## 2021-10-18 DIAGNOSIS — M20.5X1 ACQUIRED ADDUCTOVARUS ROTATION OF TOE OF RIGHT FOOT: ICD-10-CM

## 2021-10-18 DIAGNOSIS — G57.61 LESION OF RIGHT PLANTAR NERVE: Primary | ICD-10-CM

## 2021-10-18 DIAGNOSIS — E11.42 DIABETIC POLYNEUROPATHY ASSOCIATED WITH TYPE 2 DIABETES MELLITUS (H): ICD-10-CM

## 2021-10-18 DIAGNOSIS — R20.8 LOSS OF PROTECTIVE SENSATION OF SKIN OF DEFORMED FOOT: ICD-10-CM

## 2021-10-18 DIAGNOSIS — L84 CALLUS OF FOOT: ICD-10-CM

## 2021-10-18 DIAGNOSIS — M21.969 LOSS OF PROTECTIVE SENSATION OF SKIN OF DEFORMED FOOT: ICD-10-CM

## 2021-10-18 PROCEDURE — G0463 HOSPITAL OUTPT CLINIC VISIT: HCPCS | Mod: 25

## 2021-10-18 PROCEDURE — 11721 DEBRIDE NAIL 6 OR MORE: CPT | Performed by: PODIATRIST

## 2021-10-18 PROCEDURE — 20600 DRAIN/INJ JOINT/BURSA W/O US: CPT | Mod: RT | Performed by: PODIATRIST

## 2021-10-18 RX ORDER — DEXAMETHASONE SODIUM PHOSPHATE 4 MG/ML
4 INJECTION, SOLUTION INTRA-ARTICULAR; INTRALESIONAL; INTRAMUSCULAR; INTRAVENOUS; SOFT TISSUE ONCE
Status: COMPLETED | OUTPATIENT
Start: 2021-10-18 | End: 2021-10-18

## 2021-10-18 RX ADMIN — DEXAMETHASONE SODIUM PHOSPHATE 4 MG: 4 INJECTION, SOLUTION INTRA-ARTICULAR; INTRALESIONAL; INTRAMUSCULAR; INTRAVENOUS; SOFT TISSUE at 16:22

## 2021-10-18 ASSESSMENT — PAIN SCALES - GENERAL: PAINLEVEL: SEVERE PAIN (7)

## 2021-10-18 NOTE — PROGRESS NOTES
Clinic Administered Medication Documentation    Administrations This Visit     dexamethasone (DECADRON) injection 4 mg     Admin Date  10/18/2021 Action  Given Dose  4 mg Route  INTRA-ARTICULAR Site   Administered By  Gladys Vasquez LPN    Ordering Provider: Lexi Felix DPM    Patient Supplied?: No    Admin Date  10/18/2021 Action  Given Dose  4 mg Route  INTRA-ARTICULAR Site   Administered By  Gladys Vasquez LPN    Ordering Provider: Lexi Felix DPM    Patient Supplied?: No                Right foot steroid injection in the right fifth metatarsophalangeal jojint and the second distal intermetatarsal interspace

## 2021-10-18 NOTE — NURSING NOTE
"Chief Complaint   Patient presents with     Toenail     DFE       Initial /83 (BP Location: Left arm, Patient Position: Sitting, Cuff Size: Adult Regular)   Pulse 92   Temp 97.9  F (36.6  C) (Tympanic)   SpO2 93%  Estimated body mass index is 34.87 kg/m  as calculated from the following:    Height as of 8/20/21: 1.803 m (5' 11\").    Weight as of 8/20/21: 113.4 kg (250 lb).  Medication Reconciliation: erna Villalobos  "
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
## 2021-10-21 ENCOUNTER — MEDICAL CORRESPONDENCE (OUTPATIENT)
Dept: MRI IMAGING | Facility: HOSPITAL | Age: 66
End: 2021-10-21

## 2021-10-22 ENCOUNTER — HOSPITAL ENCOUNTER (OUTPATIENT)
Dept: MRI IMAGING | Facility: HOSPITAL | Age: 66
Discharge: HOME | End: 2021-10-22
Attending: FAMILY MEDICINE | Admitting: FAMILY MEDICINE
Payer: COMMERCIAL

## 2021-10-22 DIAGNOSIS — R41.3 OTHER AMNESIA: ICD-10-CM

## 2021-10-22 PROCEDURE — A9585 GADOBUTROL INJECTION: HCPCS | Performed by: RADIOLOGY

## 2021-10-22 PROCEDURE — 255N000002 HC RX 255 OP 636: Performed by: RADIOLOGY

## 2021-10-22 PROCEDURE — 70553 MRI BRAIN STEM W/O & W/DYE: CPT

## 2021-10-22 RX ORDER — GADOBUTROL 604.72 MG/ML
10 INJECTION INTRAVENOUS ONCE
Status: COMPLETED | OUTPATIENT
Start: 2021-10-22 | End: 2021-10-22

## 2021-10-22 RX ADMIN — GADOBUTROL 10 ML: 604.72 INJECTION INTRAVENOUS at 12:46

## 2022-01-07 ENCOUNTER — MEDICAL CORRESPONDENCE (OUTPATIENT)
Dept: HEALTH INFORMATION MANAGEMENT | Facility: HOSPITAL | Age: 67
End: 2022-01-07
Payer: COMMERCIAL

## 2022-01-14 ENCOUNTER — HOSPITAL ENCOUNTER (OUTPATIENT)
Dept: BONE DENSITY | Facility: HOSPITAL | Age: 67
Discharge: HOME | End: 2022-01-14
Attending: NURSE PRACTITIONER | Admitting: NURSE PRACTITIONER
Payer: MEDICARE

## 2022-01-14 DIAGNOSIS — Z78.0 ASYMPTOMATIC MENOPAUSAL STATE: ICD-10-CM

## 2022-01-14 PROCEDURE — 77080 DXA BONE DENSITY AXIAL: CPT

## 2022-02-07 ENCOUNTER — OFFICE VISIT (OUTPATIENT)
Dept: PODIATRY | Facility: OTHER | Age: 67
End: 2022-02-07
Attending: PODIATRIST
Payer: MEDICARE

## 2022-02-07 VITALS
OXYGEN SATURATION: 98 % | TEMPERATURE: 96 F | RESPIRATION RATE: 16 BRPM | HEART RATE: 82 BPM | BODY MASS INDEX: 29.71 KG/M2 | SYSTOLIC BLOOD PRESSURE: 142 MMHG | DIASTOLIC BLOOD PRESSURE: 82 MMHG | WEIGHT: 213 LBS

## 2022-02-07 DIAGNOSIS — M21.969 LOSS OF PROTECTIVE SENSATION OF SKIN OF DEFORMED FOOT: ICD-10-CM

## 2022-02-07 DIAGNOSIS — E11.42 DIABETIC POLYNEUROPATHY ASSOCIATED WITH TYPE 2 DIABETES MELLITUS (H): ICD-10-CM

## 2022-02-07 DIAGNOSIS — L60.3 ONYCHODYSTROPHY: ICD-10-CM

## 2022-02-07 DIAGNOSIS — R20.8 LOSS OF PROTECTIVE SENSATION OF SKIN OF DEFORMED FOOT: ICD-10-CM

## 2022-02-07 DIAGNOSIS — E11.9 DIABETES MELLITUS TYPE 2, NONINSULIN DEPENDENT (H): ICD-10-CM

## 2022-02-07 DIAGNOSIS — L60.0 INGROWN TOENAIL: ICD-10-CM

## 2022-02-07 DIAGNOSIS — M20.5X1 ACQUIRED ADDUCTOVARUS ROTATION OF TOE OF RIGHT FOOT: ICD-10-CM

## 2022-02-07 DIAGNOSIS — L84 CALLUS OF FOOT: ICD-10-CM

## 2022-02-07 DIAGNOSIS — M20.42 ACQUIRED HAMMER TOE DEFORMITY OF LESSER TOE OF BOTH FEET: Primary | ICD-10-CM

## 2022-02-07 DIAGNOSIS — M20.5X2 ACQUIRED ADDUCTOVARUS ROTATION OF TOE OF LEFT FOOT: ICD-10-CM

## 2022-02-07 DIAGNOSIS — M20.41 ACQUIRED HAMMER TOE DEFORMITY OF LESSER TOE OF BOTH FEET: Primary | ICD-10-CM

## 2022-02-07 PROCEDURE — 11721 DEBRIDE NAIL 6 OR MORE: CPT | Performed by: PODIATRIST

## 2022-02-07 PROCEDURE — 99213 OFFICE O/P EST LOW 20 MIN: CPT | Mod: 25 | Performed by: PODIATRIST

## 2022-02-07 PROCEDURE — G0463 HOSPITAL OUTPT CLINIC VISIT: HCPCS | Mod: 25

## 2022-02-07 ASSESSMENT — PAIN SCALES - GENERAL: PAINLEVEL: MILD PAIN (3)

## 2022-02-07 NOTE — PATIENT INSTRUCTIONS
Thank you for allowing  and our Podiatry team to participate in your care. Please call our office at [phone] with scheduling questions or with any other questions or concerns.
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
## 2022-02-07 NOTE — NURSING NOTE
"Chief Complaint   Patient presents with     Toenail     trimming       Initial BP (!) 142/82 (BP Location: Left arm, Patient Position: Sitting, Cuff Size: Adult Large)   Pulse 82   Temp (!) 96  F (35.6  C) (Tympanic)   Resp 16   Wt 96.6 kg (213 lb)   SpO2 98%   BMI 29.71 kg/m   Estimated body mass index is 29.71 kg/m  as calculated from the following:    Height as of 8/20/21: 1.803 m (5' 11\").    Weight as of this encounter: 96.6 kg (213 lb).  Medication Reconciliation: complete  Gladys Vasquez LPN  "

## 2022-03-17 ENCOUNTER — TRANSFERRED RECORDS (OUTPATIENT)
Dept: HEALTH INFORMATION MANAGEMENT | Facility: CLINIC | Age: 67
End: 2022-03-17

## 2022-03-18 ENCOUNTER — MEDICAL CORRESPONDENCE (OUTPATIENT)
Dept: MRI IMAGING | Facility: HOSPITAL | Age: 67
End: 2022-03-18
Payer: COMMERCIAL

## 2022-03-25 ENCOUNTER — HOSPITAL ENCOUNTER (OUTPATIENT)
Dept: INTERVENTIONAL RADIOLOGY/VASCULAR | Facility: HOSPITAL | Age: 67
Discharge: HOME | End: 2022-03-25
Attending: NURSE PRACTITIONER
Payer: MEDICARE

## 2022-03-25 ENCOUNTER — HOSPITAL ENCOUNTER (OUTPATIENT)
Dept: MRI IMAGING | Facility: HOSPITAL | Age: 67
Discharge: HOME | End: 2022-03-25
Attending: NURSE PRACTITIONER
Payer: MEDICARE

## 2022-03-25 DIAGNOSIS — M25.511 PAIN IN RIGHT SHOULDER: ICD-10-CM

## 2022-03-25 PROCEDURE — 23350 INJECTION FOR SHOULDER X-RAY: CPT

## 2022-03-25 PROCEDURE — 250N000009 HC RX 250: Performed by: RADIOLOGY

## 2022-03-25 PROCEDURE — 73222 MRI JOINT UPR EXTREM W/DYE: CPT | Mod: RT

## 2022-03-25 PROCEDURE — A9585 GADOBUTROL INJECTION: HCPCS | Performed by: RADIOLOGY

## 2022-03-25 PROCEDURE — 255N000002 HC RX 255 OP 636: Performed by: RADIOLOGY

## 2022-03-25 PROCEDURE — 77002 NEEDLE LOCALIZATION BY XRAY: CPT

## 2022-03-25 RX ORDER — GADOBUTROL 604.72 MG/ML
0.1 INJECTION INTRAVENOUS ONCE
Status: COMPLETED | OUTPATIENT
Start: 2022-03-25 | End: 2022-03-25

## 2022-03-25 RX ORDER — IOPAMIDOL 612 MG/ML
50 INJECTION, SOLUTION INTRAVASCULAR ONCE
Status: COMPLETED | OUTPATIENT
Start: 2022-03-25 | End: 2022-03-25

## 2022-03-25 RX ORDER — LIDOCAINE HYDROCHLORIDE 10 MG/ML
10 INJECTION, SOLUTION EPIDURAL; INFILTRATION; INTRACAUDAL; PERINEURAL ONCE
Status: COMPLETED | OUTPATIENT
Start: 2022-03-25 | End: 2022-03-25

## 2022-03-25 RX ADMIN — GADOBUTROL 0.1 ML: 604.72 INJECTION INTRAVENOUS at 15:43

## 2022-03-25 RX ADMIN — LIDOCAINE HYDROCHLORIDE 5 ML: 10 INJECTION, SOLUTION EPIDURAL; INFILTRATION; INTRACAUDAL; PERINEURAL at 15:44

## 2022-03-25 RX ADMIN — IOPAMIDOL 2 ML: 612 INJECTION, SOLUTION INTRAVENOUS at 15:44

## 2022-04-25 ENCOUNTER — OFFICE VISIT (OUTPATIENT)
Dept: PODIATRY | Facility: OTHER | Age: 67
End: 2022-04-25
Attending: PODIATRIST
Payer: MEDICARE

## 2022-04-25 VITALS
DIASTOLIC BLOOD PRESSURE: 65 MMHG | TEMPERATURE: 97.1 F | SYSTOLIC BLOOD PRESSURE: 115 MMHG | RESPIRATION RATE: 16 BRPM | HEART RATE: 72 BPM | OXYGEN SATURATION: 98 % | HEIGHT: 71 IN | BODY MASS INDEX: 29.26 KG/M2 | WEIGHT: 209 LBS

## 2022-04-25 DIAGNOSIS — M20.42 ACQUIRED HAMMER TOE DEFORMITY OF LESSER TOE OF BOTH FEET: ICD-10-CM

## 2022-04-25 DIAGNOSIS — E11.42 DIABETIC POLYNEUROPATHY ASSOCIATED WITH TYPE 2 DIABETES MELLITUS (H): ICD-10-CM

## 2022-04-25 DIAGNOSIS — M79.674 TOE PAIN, RIGHT: ICD-10-CM

## 2022-04-25 DIAGNOSIS — L84 CALLUS OF FOOT: ICD-10-CM

## 2022-04-25 DIAGNOSIS — M20.5X2 ACQUIRED ADDUCTOVARUS ROTATION OF TOE OF LEFT FOOT: ICD-10-CM

## 2022-04-25 DIAGNOSIS — M20.5X1 ACQUIRED ADDUCTOVARUS ROTATION OF TOE OF RIGHT FOOT: ICD-10-CM

## 2022-04-25 DIAGNOSIS — I78.1 TELANGIECTASIAS: ICD-10-CM

## 2022-04-25 DIAGNOSIS — M21.969 LOSS OF PROTECTIVE SENSATION OF SKIN OF DEFORMED FOOT: ICD-10-CM

## 2022-04-25 DIAGNOSIS — G57.61 LESION OF RIGHT PLANTAR NERVE: Primary | ICD-10-CM

## 2022-04-25 DIAGNOSIS — M79.675 TOE PAIN, LEFT: ICD-10-CM

## 2022-04-25 DIAGNOSIS — L60.3 ONYCHODYSTROPHY: ICD-10-CM

## 2022-04-25 DIAGNOSIS — E11.9 DIABETES MELLITUS TYPE 2, NONINSULIN DEPENDENT (H): ICD-10-CM

## 2022-04-25 DIAGNOSIS — G57.62 LESION OF LEFT PLANTAR NERVE: ICD-10-CM

## 2022-04-25 DIAGNOSIS — M20.41 ACQUIRED HAMMER TOE DEFORMITY OF LESSER TOE OF BOTH FEET: ICD-10-CM

## 2022-04-25 DIAGNOSIS — R20.8 LOSS OF PROTECTIVE SENSATION OF SKIN OF DEFORMED FOOT: ICD-10-CM

## 2022-04-25 PROCEDURE — 20600 DRAIN/INJ JOINT/BURSA W/O US: CPT | Performed by: PODIATRIST

## 2022-04-25 PROCEDURE — 20550 NJX 1 TENDON SHEATH/LIGAMENT: CPT | Performed by: PODIATRIST

## 2022-04-25 PROCEDURE — 11055 PARING/CUTG B9 HYPRKER LES 1: CPT | Performed by: PODIATRIST

## 2022-04-25 PROCEDURE — 64455 NJX AA&/STRD PLTR COM DG NRV: CPT | Performed by: PODIATRIST

## 2022-04-25 PROCEDURE — G0463 HOSPITAL OUTPT CLINIC VISIT: HCPCS

## 2022-04-25 PROCEDURE — 64455 NJX AA&/STRD PLTR COM DG NRV: CPT

## 2022-04-25 PROCEDURE — G0463 HOSPITAL OUTPT CLINIC VISIT: HCPCS | Mod: 25

## 2022-04-25 PROCEDURE — 99213 OFFICE O/P EST LOW 20 MIN: CPT | Mod: 25 | Performed by: PODIATRIST

## 2022-04-25 PROCEDURE — 11721 DEBRIDE NAIL 6 OR MORE: CPT | Performed by: PODIATRIST

## 2022-04-25 PROCEDURE — 64450 NJX AA&/STRD OTHER PN/BRANCH: CPT

## 2022-04-25 RX ORDER — LIDOCAINE HYDROCHLORIDE 20 MG/ML
1.5 INJECTION, SOLUTION INFILTRATION; PERINEURAL ONCE
Status: COMPLETED | OUTPATIENT
Start: 2022-04-25 | End: 2022-04-25

## 2022-04-25 RX ORDER — DEXAMETHASONE SODIUM PHOSPHATE 4 MG/ML
6 INJECTION, SOLUTION INTRA-ARTICULAR; INTRALESIONAL; INTRAMUSCULAR; INTRAVENOUS; SOFT TISSUE ONCE
Status: COMPLETED | OUTPATIENT
Start: 2022-04-25 | End: 2022-04-25

## 2022-04-25 RX ORDER — LEVOTHYROXINE SODIUM 75 UG/1
37 TABLET ORAL
COMMUNITY
Start: 2022-03-14 | End: 2023-09-26

## 2022-04-25 RX ADMIN — LIDOCAINE HYDROCHLORIDE 1.5 ML: 20 INJECTION, SOLUTION INFILTRATION; PERINEURAL at 11:18

## 2022-04-25 RX ADMIN — DEXAMETHASONE SODIUM PHOSPHATE 4 MG: 4 INJECTION, SOLUTION INTRA-ARTICULAR; INTRALESIONAL; INTRAMUSCULAR; INTRAVENOUS; SOFT TISSUE at 11:16

## 2022-04-25 RX ADMIN — DEXAMETHASONE SODIUM PHOSPHATE 6 MG: 4 INJECTION, SOLUTION INTRA-ARTICULAR; INTRALESIONAL; INTRAMUSCULAR; INTRAVENOUS; SOFT TISSUE at 11:17

## 2022-04-25 RX ADMIN — LIDOCAINE HYDROCHLORIDE 1.5 ML: 20 INJECTION, SOLUTION INFILTRATION; PERINEURAL at 11:19

## 2022-04-25 ASSESSMENT — PAIN SCALES - GENERAL: PAINLEVEL: SEVERE PAIN (7)

## 2022-04-25 NOTE — NURSING NOTE
"Chief Complaint   Patient presents with     Nail Problem     DFE and toe nail trimming       Initial /65 (BP Location: Left arm, Cuff Size: Adult Regular)   Pulse 72   Temp 97.1  F (36.2  C) (Tympanic)   Resp 16   Ht 1.803 m (5' 11\")   Wt 94.8 kg (209 lb)   SpO2 98%   BMI 29.15 kg/m   Estimated body mass index is 29.15 kg/m  as calculated from the following:    Height as of this encounter: 1.803 m (5' 11\").    Weight as of this encounter: 94.8 kg (209 lb).  Medication Reconciliation: complete  Mi Maria LPN  "
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
## 2022-05-18 DIAGNOSIS — J30.2 SEASONAL ALLERGIC RHINITIS, UNSPECIFIED TRIGGER: ICD-10-CM

## 2022-05-20 RX ORDER — MONTELUKAST SODIUM 10 MG/1
TABLET ORAL
Qty: 30 TABLET | Refills: 0 | Status: SHIPPED | OUTPATIENT
Start: 2022-05-20 | End: 2022-07-01

## 2022-06-27 ENCOUNTER — DOCUMENTATION ONLY (OUTPATIENT)
Dept: PODIATRY | Facility: OTHER | Age: 67
End: 2022-06-27

## 2022-06-27 DIAGNOSIS — E11.42 DIABETIC POLYNEUROPATHY ASSOCIATED WITH TYPE 2 DIABETES MELLITUS (H): ICD-10-CM

## 2022-06-27 DIAGNOSIS — M20.5X2 ACQUIRED ADDUCTOVARUS ROTATION OF TOE OF LEFT FOOT: ICD-10-CM

## 2022-06-27 DIAGNOSIS — M20.5X1 ACQUIRED ADDUCTOVARUS ROTATION OF TOE OF RIGHT FOOT: ICD-10-CM

## 2022-06-27 DIAGNOSIS — M20.41 ACQUIRED HAMMER TOE DEFORMITY OF LESSER TOE OF BOTH FEET: ICD-10-CM

## 2022-06-27 DIAGNOSIS — M20.42 ACQUIRED HAMMER TOE DEFORMITY OF LESSER TOE OF BOTH FEET: ICD-10-CM

## 2022-06-27 DIAGNOSIS — E11.9 DIABETES MELLITUS TYPE 2, NONINSULIN DEPENDENT (H): Primary | ICD-10-CM

## 2022-06-27 DIAGNOSIS — G57.62 LESION OF LEFT PLANTAR NERVE: ICD-10-CM

## 2022-06-27 DIAGNOSIS — G57.61 LESION OF RIGHT PLANTAR NERVE: ICD-10-CM

## 2022-06-27 NOTE — PROGRESS NOTES
Prior order  22. Please review and sign pended order.    Thank you,  Marian Seo [facility]

## 2022-06-30 ENCOUNTER — OFFICE VISIT (OUTPATIENT)
Dept: PODIATRY | Facility: OTHER | Age: 67
End: 2022-06-30
Attending: PODIATRIST
Payer: MEDICARE

## 2022-06-30 VITALS
TEMPERATURE: 96.4 F | DIASTOLIC BLOOD PRESSURE: 72 MMHG | SYSTOLIC BLOOD PRESSURE: 124 MMHG | HEART RATE: 87 BPM | OXYGEN SATURATION: 98 %

## 2022-06-30 DIAGNOSIS — L60.3 ONYCHODYSTROPHY: Primary | ICD-10-CM

## 2022-06-30 DIAGNOSIS — E11.9 DIABETES MELLITUS TYPE 2, NONINSULIN DEPENDENT (H): ICD-10-CM

## 2022-06-30 DIAGNOSIS — I78.1 TELANGIECTASIAS: ICD-10-CM

## 2022-06-30 DIAGNOSIS — R20.8 LOSS OF PROTECTIVE SENSATION OF SKIN OF DEFORMED FOOT: ICD-10-CM

## 2022-06-30 DIAGNOSIS — M72.2 PLANTAR FASCIAL FIBROMATOSIS: ICD-10-CM

## 2022-06-30 DIAGNOSIS — M20.42 ACQUIRED HAMMER TOE DEFORMITY OF LESSER TOE OF BOTH FEET: ICD-10-CM

## 2022-06-30 DIAGNOSIS — E11.42 DIABETIC POLYNEUROPATHY ASSOCIATED WITH TYPE 2 DIABETES MELLITUS (H): ICD-10-CM

## 2022-06-30 DIAGNOSIS — M20.5X1 ACQUIRED ADDUCTOVARUS ROTATION OF TOE OF RIGHT FOOT: ICD-10-CM

## 2022-06-30 DIAGNOSIS — M20.41 ACQUIRED HAMMER TOE DEFORMITY OF LESSER TOE OF BOTH FEET: ICD-10-CM

## 2022-06-30 DIAGNOSIS — M21.969 LOSS OF PROTECTIVE SENSATION OF SKIN OF DEFORMED FOOT: ICD-10-CM

## 2022-06-30 DIAGNOSIS — M77.51 BURSITIS OF RIGHT FOOT: ICD-10-CM

## 2022-06-30 DIAGNOSIS — M20.5X2 ACQUIRED ADDUCTOVARUS ROTATION OF TOE OF LEFT FOOT: ICD-10-CM

## 2022-06-30 PROCEDURE — 11721 DEBRIDE NAIL 6 OR MORE: CPT | Performed by: PODIATRIST

## 2022-06-30 PROCEDURE — 99212 OFFICE O/P EST SF 10 MIN: CPT | Mod: 25 | Performed by: PODIATRIST

## 2022-06-30 PROCEDURE — G0463 HOSPITAL OUTPT CLINIC VISIT: HCPCS | Mod: 25

## 2022-06-30 RX ORDER — MIRTAZAPINE 15 MG/1
TABLET, FILM COATED ORAL
COMMUNITY
Start: 2022-06-24 | End: 2022-11-10

## 2022-06-30 RX ORDER — CLONAZEPAM 1 MG/1
TABLET ORAL
COMMUNITY
Start: 2022-06-16 | End: 2023-09-26

## 2022-06-30 RX ORDER — CEFPROZIL 500 MG/1
TABLET, FILM COATED ORAL
COMMUNITY
Start: 2022-06-13 | End: 2023-09-26

## 2022-06-30 RX ORDER — METHYLPHENIDATE HYDROCHLORIDE 54 MG/1
TABLET ORAL
COMMUNITY
Start: 2022-06-24 | End: 2023-09-26

## 2022-06-30 ASSESSMENT — PAIN SCALES - GENERAL: PAINLEVEL: SEVERE PAIN (6)

## 2022-06-30 NOTE — NURSING NOTE
"Chief Complaint   Patient presents with     Toenail     DFE       Initial /72 (BP Location: Left arm, Patient Position: Sitting, Cuff Size: Adult Regular)   Pulse 87   Temp (!) 96.4  F (35.8  C) (Tympanic)   SpO2 98%  Estimated body mass index is 29.15 kg/m  as calculated from the following:    Height as of 4/25/22: 1.803 m (5' 11\").    Weight as of 4/25/22: 94.8 kg (209 lb).  Medication Reconciliation: complete  Mary Villalobos  "
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
## 2022-07-01 DIAGNOSIS — J30.2 SEASONAL ALLERGIC RHINITIS, UNSPECIFIED TRIGGER: ICD-10-CM

## 2022-07-01 RX ORDER — MONTELUKAST SODIUM 10 MG/1
TABLET ORAL
Qty: 30 TABLET | Refills: 0 | Status: SHIPPED | OUTPATIENT
Start: 2022-07-01 | End: 2022-08-22

## 2022-07-01 NOTE — TELEPHONE ENCOUNTER
Singulair  Last Written Prescription Date: 5/20//22  Last Fill Quantity: 30 # of Refills: 0  Last Office Visit: 5/3/21

## 2022-08-16 ENCOUNTER — HOSPITAL ENCOUNTER (EMERGENCY)
Facility: HOSPITAL | Age: 67
Discharge: HOME | End: 2022-08-16
Attending: EMERGENCY MEDICINE | Admitting: EMERGENCY MEDICINE
Payer: MEDICARE

## 2022-08-16 ENCOUNTER — APPOINTMENT (OUTPATIENT)
Dept: CT IMAGING | Facility: HOSPITAL | Age: 67
End: 2022-08-16
Attending: EMERGENCY MEDICINE
Payer: MEDICARE

## 2022-08-16 VITALS
WEIGHT: 208 LBS | BODY MASS INDEX: 29.01 KG/M2 | SYSTOLIC BLOOD PRESSURE: 200 MMHG | DIASTOLIC BLOOD PRESSURE: 134 MMHG | HEART RATE: 79 BPM | TEMPERATURE: 97.5 F | RESPIRATION RATE: 24 BRPM | OXYGEN SATURATION: 100 %

## 2022-08-16 DIAGNOSIS — R11.10 VOMITING AND DIARRHEA: ICD-10-CM

## 2022-08-16 DIAGNOSIS — R19.7 VOMITING AND DIARRHEA: ICD-10-CM

## 2022-08-16 DIAGNOSIS — U07.1 INFECTION DUE TO 2019 NOVEL CORONAVIRUS: ICD-10-CM

## 2022-08-16 LAB
ALBUMIN SERPL-MCNC: 3.8 G/DL (ref 3.4–5)
ALP SERPL-CCNC: 144 U/L (ref 40–150)
ALT SERPL W P-5'-P-CCNC: 14 U/L (ref 0–50)
ANION GAP SERPL CALCULATED.3IONS-SCNC: 7 MMOL/L (ref 3–14)
AST SERPL W P-5'-P-CCNC: 17 U/L (ref 0–45)
BASOPHILS # BLD AUTO: 0 10E3/UL (ref 0–0.2)
BASOPHILS NFR BLD AUTO: 0 %
BILIRUB SERPL-MCNC: 0.8 MG/DL (ref 0.2–1.3)
BUN SERPL-MCNC: 12 MG/DL (ref 7–30)
CALCIUM SERPL-MCNC: 10.3 MG/DL (ref 8.5–10.1)
CHLORIDE BLD-SCNC: 106 MMOL/L (ref 94–109)
CO2 SERPL-SCNC: 26 MMOL/L (ref 20–32)
CREAT SERPL-MCNC: 0.71 MG/DL (ref 0.52–1.04)
EOSINOPHIL # BLD AUTO: 0.2 10E3/UL (ref 0–0.7)
EOSINOPHIL NFR BLD AUTO: 2 %
ERYTHROCYTE [DISTWIDTH] IN BLOOD BY AUTOMATED COUNT: 12.6 % (ref 10–15)
FLUAV RNA SPEC QL NAA+PROBE: NEGATIVE
FLUBV RNA RESP QL NAA+PROBE: NEGATIVE
GFR SERPL CREATININE-BSD FRML MDRD: >90 ML/MIN/1.73M2
GLUCOSE BLD-MCNC: 106 MG/DL (ref 70–99)
HCT VFR BLD AUTO: 47.3 % (ref 35–47)
HGB BLD-MCNC: 16.1 G/DL (ref 11.7–15.7)
IMM GRANULOCYTES # BLD: 0.1 10E3/UL
IMM GRANULOCYTES NFR BLD: 1 %
INR PPP: 0.98 (ref 0.85–1.15)
LIPASE SERPL-CCNC: 108 U/L (ref 73–393)
LYMPHOCYTES # BLD AUTO: 2.6 10E3/UL (ref 0.8–5.3)
LYMPHOCYTES NFR BLD AUTO: 20 %
MCH RBC QN AUTO: 29.3 PG (ref 26.5–33)
MCHC RBC AUTO-ENTMCNC: 34 G/DL (ref 31.5–36.5)
MCV RBC AUTO: 86 FL (ref 78–100)
MONOCYTES # BLD AUTO: 0.7 10E3/UL (ref 0–1.3)
MONOCYTES NFR BLD AUTO: 6 %
NEUTROPHILS # BLD AUTO: 9.2 10E3/UL (ref 1.6–8.3)
NEUTROPHILS NFR BLD AUTO: 71 %
NRBC # BLD AUTO: 0 10E3/UL
NRBC BLD AUTO-RTO: 0 /100
PLATELET # BLD AUTO: 265 10E3/UL (ref 150–450)
POTASSIUM BLD-SCNC: 3.9 MMOL/L (ref 3.4–5.3)
PROT SERPL-MCNC: 8.2 G/DL (ref 6.8–8.8)
RBC # BLD AUTO: 5.49 10E6/UL (ref 3.8–5.2)
RSV RNA SPEC NAA+PROBE: NEGATIVE
SARS-COV-2 RNA RESP QL NAA+PROBE: POSITIVE
SODIUM SERPL-SCNC: 139 MMOL/L (ref 133–144)
WBC # BLD AUTO: 12.8 10E3/UL (ref 4–11)

## 2022-08-16 PROCEDURE — 85025 COMPLETE CBC W/AUTO DIFF WBC: CPT | Performed by: EMERGENCY MEDICINE

## 2022-08-16 PROCEDURE — 87637 SARSCOV2&INF A&B&RSV AMP PRB: CPT | Performed by: EMERGENCY MEDICINE

## 2022-08-16 PROCEDURE — 36415 COLL VENOUS BLD VENIPUNCTURE: CPT | Performed by: EMERGENCY MEDICINE

## 2022-08-16 PROCEDURE — 80053 COMPREHEN METABOLIC PANEL: CPT | Performed by: EMERGENCY MEDICINE

## 2022-08-16 PROCEDURE — G1010 CDSM STANSON: HCPCS

## 2022-08-16 PROCEDURE — 99284 EMERGENCY DEPT VISIT MOD MDM: CPT | Performed by: EMERGENCY MEDICINE

## 2022-08-16 PROCEDURE — 83690 ASSAY OF LIPASE: CPT | Performed by: EMERGENCY MEDICINE

## 2022-08-16 PROCEDURE — 96374 THER/PROPH/DIAG INJ IV PUSH: CPT | Mod: XU

## 2022-08-16 PROCEDURE — 96361 HYDRATE IV INFUSION ADD-ON: CPT

## 2022-08-16 PROCEDURE — 258N000003 HC RX IP 258 OP 636: Performed by: EMERGENCY MEDICINE

## 2022-08-16 PROCEDURE — C9803 HOPD COVID-19 SPEC COLLECT: HCPCS

## 2022-08-16 PROCEDURE — 99285 EMERGENCY DEPT VISIT HI MDM: CPT | Mod: 25

## 2022-08-16 PROCEDURE — 250N000011 HC RX IP 250 OP 636: Performed by: RADIOLOGY

## 2022-08-16 PROCEDURE — 250N000011 HC RX IP 250 OP 636: Performed by: EMERGENCY MEDICINE

## 2022-08-16 PROCEDURE — 85610 PROTHROMBIN TIME: CPT | Performed by: EMERGENCY MEDICINE

## 2022-08-16 RX ORDER — IOPAMIDOL 755 MG/ML
102 INJECTION, SOLUTION INTRAVASCULAR ONCE
Status: COMPLETED | OUTPATIENT
Start: 2022-08-16 | End: 2022-08-16

## 2022-08-16 RX ORDER — ONDANSETRON 4 MG/1
4 TABLET, ORALLY DISINTEGRATING ORAL EVERY 6 HOURS PRN
Qty: 10 TABLET | Refills: 0 | Status: SHIPPED | OUTPATIENT
Start: 2022-08-16 | End: 2022-08-23

## 2022-08-16 RX ORDER — ONDANSETRON 2 MG/ML
4 INJECTION INTRAMUSCULAR; INTRAVENOUS EVERY 30 MIN PRN
Status: DISCONTINUED | OUTPATIENT
Start: 2022-08-16 | End: 2022-08-16 | Stop reason: HOSPADM

## 2022-08-16 RX ORDER — LOPERAMIDE HYDROCHLORIDE 2 MG/1
2 TABLET ORAL 4 TIMES DAILY PRN
Qty: 20 TABLET | Refills: 0 | Status: SHIPPED | OUTPATIENT
Start: 2022-08-16 | End: 2023-09-26

## 2022-08-16 RX ORDER — SODIUM CHLORIDE 9 MG/ML
INJECTION, SOLUTION INTRAVENOUS CONTINUOUS
Status: DISCONTINUED | OUTPATIENT
Start: 2022-08-16 | End: 2022-08-16 | Stop reason: HOSPADM

## 2022-08-16 RX ADMIN — ONDANSETRON 4 MG: 2 INJECTION INTRAMUSCULAR; INTRAVENOUS at 15:13

## 2022-08-16 RX ADMIN — SODIUM CHLORIDE 1000 ML: 9 INJECTION, SOLUTION INTRAVENOUS at 14:58

## 2022-08-16 RX ADMIN — IOPAMIDOL 102 ML: 755 INJECTION, SOLUTION INTRAVENOUS at 16:36

## 2022-08-16 ASSESSMENT — ENCOUNTER SYMPTOMS
VOMITING: 1
EYES NEGATIVE: 1
ABDOMINAL PAIN: 1
NEUROLOGICAL NEGATIVE: 1
HEMATOLOGIC/LYMPHATIC NEGATIVE: 1
NAUSEA: 1
ENDOCRINE NEGATIVE: 1
CARDIOVASCULAR NEGATIVE: 1
DIARRHEA: 1
RESPIRATORY NEGATIVE: 1
CONSTITUTIONAL NEGATIVE: 1
MUSCULOSKELETAL NEGATIVE: 1

## 2022-08-16 ASSESSMENT — ACTIVITIES OF DAILY LIVING (ADL)
ADLS_ACUITY_SCORE: 37
ADLS_ACUITY_SCORE: 37

## 2022-08-16 NOTE — ED NOTES
Patient discharged home at this time. Patient given written and verbal discharge instructions regarding home care, f/u and medications. Patient verbalized understanding of all discharge instructions.     Pt and provider aware of high blood pressures.  Pt unsure if she has any new meds at home for blood pressure.  Pt advised to have BP recheck with PCP within the next 1-2 days for possible new HTN.

## 2022-08-16 NOTE — DISCHARGE INSTRUCTIONS
What to expect when you have contrast    During your exam, we will inject  contrast  into your vein or artery. (Contrast is a clear liquid with iodine in it. It shows up on X-rays.)    You may feel warm or hot. You may have a metal taste in your mouth and a slight upset stomach. You may also feel pressure near the kidneys and bladder. These effects will last about 1 to 3 minutes.    Please tell us if you have:   Sneezing    Itching   Hives    Swelling in the face   A hoarse voice   Breathing problems   Other new symptoms    Serious problems are rare.  They may include:   Irregular heartbeat    Seizures   Kidney failure             Tissue damage   Shock     Death    If you have any problems during the exam, we  will treat them right away.    When you get home    Call your hospital if you have any new symptoms in the next 2 days, like hives or swelling. (Phone numbers are at the bottom of this page.) Or call your family doctor.     If you have wheezing or trouble breathing, call 911.    Self-care  -Drink at least 4 extra glasses of water today.   This reduces the stress on your kidneys.  -Keep taking your regular medicines.    The contrast will pass out of your body in your  Urine(pee). This will happen in the next 24 hours. You  will not feel this. Your urine will not  change color.    If you have kidney problems or take metformin    Drink 4 to 8 large glasses of water for the next  2 days, if you are not on a fluid restriction.    ?If you take metformin (Glucophage or Glucovance) for diabetes, keep taking it.      ?Your kidney function tests are abnormal.  If you take Metformin, do not take it for 48 hours. Please go to your clinic for a blood test within 3 days after your exam before the restarting this medicine.     (Note to provider:please give patient prescription for lab tests.)    ?Special instructions: -    I have read and understand the above information.    Patient Sign  Here:______________________________________Date:________Time:______    Staff Sign Here:________________________________________Date:_______Time:______      Radiology Departments:     ?[facility]: [phone] ?[place]: [phone]     ?[place]: [phone] ?[place]:[phone]      ?[place]: [phone]  ?[place]: [phone]  ?[place]:[phone]    ?[facility] [place]:[phone]  ?[facility]:[phone]

## 2022-08-16 NOTE — ED TRIAGE NOTES
"\"Here for nausea, vomiting, diarrhea, abdominal pain and generalized weakness.  Diagnosed with Mono about 3-4 weeks ago.\"        "

## 2022-08-16 NOTE — ED NOTES
"Pt presents with NVD.  Pt reports that she has had diarrhea since Friday along with nausea and vomiting Saturday and Sunday.  Pt reports that she has this \"burping feeling.\"  Pt tested positive for mono 3-4 weeks ago, but was not tested for COVID at that time.  Pts  had COVID a month ago, but they live in separate areas of the house she reports.  Pt had a negative home COVID test on Sunday.  "

## 2022-08-18 DIAGNOSIS — J30.2 SEASONAL ALLERGIC RHINITIS, UNSPECIFIED TRIGGER: ICD-10-CM

## 2022-08-22 RX ORDER — MONTELUKAST SODIUM 10 MG/1
TABLET ORAL
Qty: 30 TABLET | Refills: 0 | Status: SHIPPED | OUTPATIENT
Start: 2022-08-22 | End: 2022-09-22

## 2022-08-22 NOTE — TELEPHONE ENCOUNTER
Montelukast      Last Written Prescription Date:  7/1/22  Last Fill Quantity: 30,   # refills: 0  Last Office Visit: 3/10/21  Future Office visit:    Next 5 appointments (look out 90 days)    Sep 06, 2022 10:30 AM  (Arrive by 10:15 AM)  Return Visit with Lexi Felix DPM  [facility] ([facility] [place] ) [address] [place] [id number] [phone]

## 2022-08-30 DIAGNOSIS — R53.83 FATIGUE: Primary | ICD-10-CM

## 2022-09-06 ENCOUNTER — OFFICE VISIT (OUTPATIENT)
Dept: PODIATRY | Facility: OTHER | Age: 67
End: 2022-09-06
Attending: PODIATRIST
Payer: MEDICARE

## 2022-09-06 VITALS
OXYGEN SATURATION: 96 % | HEART RATE: 104 BPM | SYSTOLIC BLOOD PRESSURE: 107 MMHG | TEMPERATURE: 96.6 F | DIASTOLIC BLOOD PRESSURE: 71 MMHG

## 2022-09-06 DIAGNOSIS — I78.1 TELANGIECTASIAS: ICD-10-CM

## 2022-09-06 DIAGNOSIS — M20.5X2 ACQUIRED ADDUCTOVARUS ROTATION OF TOE OF LEFT FOOT: ICD-10-CM

## 2022-09-06 DIAGNOSIS — M20.5X1 ACQUIRED ADDUCTOVARUS ROTATION OF TOE OF RIGHT FOOT: ICD-10-CM

## 2022-09-06 DIAGNOSIS — M21.969 LOSS OF PROTECTIVE SENSATION OF SKIN OF DEFORMED FOOT: ICD-10-CM

## 2022-09-06 DIAGNOSIS — R20.8 LOSS OF PROTECTIVE SENSATION OF SKIN OF DEFORMED FOOT: ICD-10-CM

## 2022-09-06 DIAGNOSIS — M20.42 ACQUIRED HAMMER TOE DEFORMITY OF LESSER TOE OF BOTH FEET: ICD-10-CM

## 2022-09-06 DIAGNOSIS — E11.42 DIABETIC POLYNEUROPATHY ASSOCIATED WITH TYPE 2 DIABETES MELLITUS (H): ICD-10-CM

## 2022-09-06 DIAGNOSIS — L60.3 ONYCHODYSTROPHY: Primary | ICD-10-CM

## 2022-09-06 DIAGNOSIS — E11.9 DIABETES MELLITUS TYPE 2, NONINSULIN DEPENDENT (H): ICD-10-CM

## 2022-09-06 DIAGNOSIS — M20.41 ACQUIRED HAMMER TOE DEFORMITY OF LESSER TOE OF BOTH FEET: ICD-10-CM

## 2022-09-06 DIAGNOSIS — G57.61 LESION OF RIGHT PLANTAR NERVE: ICD-10-CM

## 2022-09-06 PROCEDURE — 11721 DEBRIDE NAIL 6 OR MORE: CPT | Performed by: PODIATRIST

## 2022-09-06 PROCEDURE — G0463 HOSPITAL OUTPT CLINIC VISIT: HCPCS | Mod: 25

## 2022-09-06 RX ORDER — ALBUTEROL SULFATE 90 UG/1
AEROSOL, METERED RESPIRATORY (INHALATION)
COMMUNITY
Start: 2022-06-29

## 2022-09-06 RX ORDER — FLUTICASONE PROPIONATE 50 MCG
SPRAY, SUSPENSION (ML) NASAL
COMMUNITY
Start: 2022-07-11 | End: 2023-09-26

## 2022-09-06 ASSESSMENT — PAIN SCALES - GENERAL: PAINLEVEL: EXTREME PAIN (8)
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
## 2022-09-06 NOTE — NURSING NOTE
"Chief Complaint   Patient presents with     Toenail     DFE       Initial /71 (BP Location: Left arm, Patient Position: Sitting, Cuff Size: Adult Regular)   Pulse 104   Temp (!) 96.6  F (35.9  C) (Tympanic)   SpO2 96%  Estimated body mass index is 29.01 kg/m  as calculated from the following:    Height as of 4/25/22: 1.803 m (5' 11\").    Weight as of 8/16/22: 94.3 kg (208 lb).  Medication Reconciliation: complete  Mary Villalobos  "

## 2022-09-10 NOTE — DISCHARGE INSTRUCTIONS
Home number on file [phone] (home)  Is it ok to leave a message at this number(s)? Yes    Dr. Benitez completed your procedure on 6/16/2021.    Current Pain Level (0-10 Scale): 6/10  Post Pain Level (0-10):  0/10    Radiology Discharge instructions for Steroid Injection    Activity Level:     Do not do any heavy activity or exercise for 24 hours.   Do not drive for 4 hours after your injection.  Diet:   Return to your normal diet.  Medications:   If you have stopped taking your Aspirin, Coumadin/Warfarin, Ibuprofen, or any   other blood thinner for this procedure you may resume in the morning unless   your primary care provider has given you other instructions.    Diabetics may see an increase in blood sugar after steroid injections. If you are concerned about your blood sugar, please contact your family doctor.    Site Care:  Remove the bandage and bathe or shower the morning after the procedure.      Please allow two weeks to experience improvement in your pain.  If you have any further issues, please contact your provider.    Call your Primary Care Provider if you have the following (if your primary care provider is not available please seek emergency care):   Nausea with vomiting   Severe headache   Drowsiness or confusion   Redness or drainage at the injection or puncture site   Temperature over 101 degrees F   Other concerns   Worsening back pain   Stiff neck        
Time-based billing (NON-critical care)

## 2022-09-20 DIAGNOSIS — J30.2 SEASONAL ALLERGIC RHINITIS, UNSPECIFIED TRIGGER: ICD-10-CM

## 2022-09-21 NOTE — TELEPHONE ENCOUNTER
singulair      Last Written Prescription Date:  8/22/22  Last Fill Quantity: 30,   # refills: 0  Last Office Visit: 5/3/21  Future Office visit:    Next 5 appointments (look out 90 days)    Nov 09, 2022 10:00 AM  (Arrive by 9:45 AM)  Return Visit with Lexi Felix DPM  [facility] ([facility] [place] ) [address] [place] [id number] [phone]

## 2022-09-22 RX ORDER — MONTELUKAST SODIUM 10 MG/1
TABLET ORAL
Qty: 30 TABLET | Refills: 0 | Status: SHIPPED | OUTPATIENT
Start: 2022-09-22 | End: 2022-11-01

## 2022-09-28 ENCOUNTER — DOCUMENTATION ONLY (OUTPATIENT)
Dept: SLEEP MEDICINE | Facility: HOSPITAL | Age: 67
End: 2022-09-28

## 2022-09-28 NOTE — PROGRESS NOTES
STOP WILFREDO       Name: Mary Jean Lesch MRN# [id number]   Age: 67 year old YOB: 1955     Stop Bang questionnaire completed with a score of >3 to allow for HST     Have you been told you snore loudly (louder than talking or loud enough to be heard through doors)? NO    Do you often feel tired, fatigued, or sleepy during the daytime? YES    Has anyone observed you stop breathing during your sleep? NO    Do you have or are you being treated for high blood pressure? NO    Is your BMI greater than 35? NO    Is your neck size circumference 16 inches or greater? NO    Are you over 50 years old? YES    Stop Bang Score (# of yes): 1
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
## 2022-11-09 ENCOUNTER — OFFICE VISIT (OUTPATIENT)
Dept: PODIATRY | Facility: OTHER | Age: 67
End: 2022-11-09
Attending: PODIATRIST
Payer: COMMERCIAL

## 2022-11-09 ENCOUNTER — ANCILLARY PROCEDURE (OUTPATIENT)
Dept: GENERAL RADIOLOGY | Facility: OTHER | Age: 67
End: 2022-11-09
Attending: PODIATRIST
Payer: MEDICARE

## 2022-11-09 VITALS
SYSTOLIC BLOOD PRESSURE: 134 MMHG | DIASTOLIC BLOOD PRESSURE: 76 MMHG | OXYGEN SATURATION: 98 % | TEMPERATURE: 96.9 F | HEART RATE: 79 BPM

## 2022-11-09 DIAGNOSIS — R20.8 LOSS OF PROTECTIVE SENSATION OF SKIN OF DEFORMED FOOT: ICD-10-CM

## 2022-11-09 DIAGNOSIS — L60.3 ONYCHODYSTROPHY: ICD-10-CM

## 2022-11-09 DIAGNOSIS — M20.42 ACQUIRED HAMMER TOE DEFORMITY OF LESSER TOE OF BOTH FEET: ICD-10-CM

## 2022-11-09 DIAGNOSIS — M25.571 SINUS TARSI SYNDROME OF RIGHT ANKLE: ICD-10-CM

## 2022-11-09 DIAGNOSIS — E11.9 DIABETES MELLITUS TYPE 2, NONINSULIN DEPENDENT (H): ICD-10-CM

## 2022-11-09 DIAGNOSIS — M77.8 EXTENSOR TENDINITIS OF FOOT: ICD-10-CM

## 2022-11-09 DIAGNOSIS — M77.41 METATARSALGIA OF RIGHT FOOT: ICD-10-CM

## 2022-11-09 DIAGNOSIS — M20.5X1 ACQUIRED ADDUCTOVARUS ROTATION OF TOE OF RIGHT FOOT: ICD-10-CM

## 2022-11-09 DIAGNOSIS — E11.42 DIABETIC POLYNEUROPATHY ASSOCIATED WITH TYPE 2 DIABETES MELLITUS (H): ICD-10-CM

## 2022-11-09 DIAGNOSIS — I78.1 TELANGIECTASIAS: ICD-10-CM

## 2022-11-09 DIAGNOSIS — M21.969 LOSS OF PROTECTIVE SENSATION OF SKIN OF DEFORMED FOOT: ICD-10-CM

## 2022-11-09 DIAGNOSIS — M20.41 ACQUIRED HAMMER TOE DEFORMITY OF LESSER TOE OF BOTH FEET: ICD-10-CM

## 2022-11-09 DIAGNOSIS — M20.5X2 ACQUIRED ADDUCTOVARUS ROTATION OF TOE OF LEFT FOOT: ICD-10-CM

## 2022-11-09 PROCEDURE — G0463 HOSPITAL OUTPT CLINIC VISIT: HCPCS | Mod: 25

## 2022-11-09 PROCEDURE — G0463 HOSPITAL OUTPT CLINIC VISIT: HCPCS

## 2022-11-09 PROCEDURE — 99214 OFFICE O/P EST MOD 30 MIN: CPT | Performed by: PODIATRIST

## 2022-11-09 PROCEDURE — 73630 X-RAY EXAM OF FOOT: CPT | Mod: TC,RT

## 2022-11-09 RX ORDER — METHYLPHENIDATE HYDROCHLORIDE 36 MG/1
TABLET ORAL
COMMUNITY
Start: 2022-05-27 | End: 2022-11-09

## 2022-11-09 RX ORDER — LEVOTHYROXINE SODIUM 150 UG/1
TABLET ORAL
COMMUNITY
Start: 2022-10-26

## 2022-11-09 ASSESSMENT — PAIN SCALES - GENERAL: PAINLEVEL: EXTREME PAIN (8)
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
## 2022-11-09 NOTE — NURSING NOTE
"Chief Complaint   Patient presents with     Toenail     DFE       Initial /76 (BP Location: Left arm, Patient Position: Sitting, Cuff Size: Adult Regular)   Pulse 79   Temp 96.9  F (36.1  C) (Tympanic)   SpO2 98%  Estimated body mass index is 29.01 kg/m  as calculated from the following:    Height as of 4/25/22: 1.803 m (5' 11\").    Weight as of 8/16/22: 94.3 kg (208 lb).  Medication Reconciliation: complete  Mary Villalobos LPN  "

## 2022-11-10 ENCOUNTER — VIRTUAL VISIT (OUTPATIENT)
Dept: PULMONOLOGY | Facility: OTHER | Age: 67
End: 2022-11-10
Attending: FAMILY MEDICINE
Payer: COMMERCIAL

## 2022-11-10 VITALS — BODY MASS INDEX: 30.8 KG/M2 | HEIGHT: 71 IN | WEIGHT: 220 LBS

## 2022-11-10 DIAGNOSIS — E11.69 TYPE 2 DIABETES MELLITUS WITH OTHER SPECIFIED COMPLICATION, UNSPECIFIED WHETHER LONG TERM INSULIN USE (H): Primary | ICD-10-CM

## 2022-11-10 DIAGNOSIS — J96.22 ACUTE ON CHRONIC RESPIRATORY FAILURE WITH HYPERCAPNIA (H): ICD-10-CM

## 2022-11-10 DIAGNOSIS — F31.89 OTHER BIPOLAR DISORDER (H): ICD-10-CM

## 2022-11-10 DIAGNOSIS — I10 BENIGN ESSENTIAL HYPERTENSION: ICD-10-CM

## 2022-11-10 PROCEDURE — 99202 OFFICE O/P NEW SF 15 MIN: CPT | Performed by: FAMILY MEDICINE

## 2022-11-10 ASSESSMENT — SLEEP AND FATIGUE QUESTIONNAIRES
HOW LIKELY ARE YOU TO NOD OFF OR FALL ASLEEP WHILE SITTING AND READING: SLIGHT CHANCE OF DOZING
HOW LIKELY ARE YOU TO NOD OFF OR FALL ASLEEP WHILE LYING DOWN TO REST IN THE AFTERNOON WHEN CIRCUMSTANCES PERMIT: HIGH CHANCE OF DOZING
HOW LIKELY ARE YOU TO NOD OFF OR FALL ASLEEP WHILE SITTING AND TALKING TO SOMEONE: WOULD NEVER DOZE
HOW LIKELY ARE YOU TO NOD OFF OR FALL ASLEEP WHEN YOU ARE A PASSENGER IN A CAR FOR AN HOUR WITHOUT A BREAK: SLIGHT CHANCE OF DOZING
HOW LIKELY ARE YOU TO NOD OFF OR FALL ASLEEP WHILE SITTING INACTIVE IN A PUBLIC PLACE: WOULD NEVER DOZE
HOW LIKELY ARE YOU TO NOD OFF OR FALL ASLEEP WHILE SITTING QUIETLY AFTER LUNCH WITHOUT ALCOHOL: WOULD NEVER DOZE
HOW LIKELY ARE YOU TO NOD OFF OR FALL ASLEEP WHILE WATCHING TV: WOULD NEVER DOZE
HOW LIKELY ARE YOU TO NOD OFF OR FALL ASLEEP IN A CAR, WHILE STOPPED FOR A FEW MINUTES IN TRAFFIC: WOULD NEVER DOZE
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
## 2022-11-10 NOTE — NURSING NOTE
Chief Complaint   Patient presents with     Telephone     Consult       Patient confirms medications and allergies are accurate v and or denies any changes since last reviewed/verified on 11/9    Teresita Bazan, Virtual Facilitator/LPN

## 2022-11-15 ENCOUNTER — HOSPITAL ENCOUNTER (OUTPATIENT)
Dept: MRI IMAGING | Facility: HOSPITAL | Age: 67
Discharge: HOME | End: 2022-11-15
Attending: PODIATRIST | Admitting: PODIATRIST
Payer: MEDICARE

## 2022-11-15 DIAGNOSIS — M77.41 METATARSALGIA OF RIGHT FOOT: ICD-10-CM

## 2022-11-15 DIAGNOSIS — M25.571 SINUS TARSI SYNDROME OF RIGHT ANKLE: ICD-10-CM

## 2022-11-15 DIAGNOSIS — M77.8 EXTENSOR TENDINITIS OF FOOT: ICD-10-CM

## 2022-11-15 PROCEDURE — 73718 MRI LOWER EXTREMITY W/O DYE: CPT | Mod: RT

## 2022-11-17 DIAGNOSIS — M25.571 SINUS TARSI SYNDROME OF RIGHT ANKLE: ICD-10-CM

## 2022-11-17 DIAGNOSIS — M77.41 METATARSALGIA OF RIGHT FOOT: Primary | ICD-10-CM

## 2022-11-17 DIAGNOSIS — M77.8 EXTENSOR TENDINITIS OF FOOT: ICD-10-CM

## 2022-11-30 ENCOUNTER — MEDICAL CORRESPONDENCE (OUTPATIENT)
Dept: NUCLEAR MEDICINE | Facility: HOSPITAL | Age: 67
End: 2022-11-30

## 2022-12-19 ENCOUNTER — OFFICE VISIT (OUTPATIENT)
Dept: PODIATRY | Facility: OTHER | Age: 67
End: 2022-12-19
Attending: PODIATRIST
Payer: OTHER MISCELLANEOUS

## 2022-12-19 VITALS
HEART RATE: 79 BPM | DIASTOLIC BLOOD PRESSURE: 105 MMHG | SYSTOLIC BLOOD PRESSURE: 173 MMHG | OXYGEN SATURATION: 98 % | TEMPERATURE: 96.6 F

## 2022-12-19 DIAGNOSIS — L60.3 ONYCHODYSTROPHY: ICD-10-CM

## 2022-12-19 DIAGNOSIS — M25.571 SINUS TARSI SYNDROME OF RIGHT ANKLE: ICD-10-CM

## 2022-12-19 DIAGNOSIS — E11.9 DIABETES MELLITUS TYPE 2, NONINSULIN DEPENDENT (H): ICD-10-CM

## 2022-12-19 DIAGNOSIS — M77.8 EXTENSOR TENDINITIS OF FOOT: ICD-10-CM

## 2022-12-19 DIAGNOSIS — M20.5X2 ACQUIRED ADDUCTOVARUS ROTATION OF TOE OF LEFT FOOT: ICD-10-CM

## 2022-12-19 DIAGNOSIS — M20.5X1 ACQUIRED ADDUCTOVARUS ROTATION OF TOE OF RIGHT FOOT: ICD-10-CM

## 2022-12-19 DIAGNOSIS — M20.42 ACQUIRED HAMMER TOE DEFORMITY OF LESSER TOE OF BOTH FEET: ICD-10-CM

## 2022-12-19 DIAGNOSIS — R20.8 LOSS OF PROTECTIVE SENSATION OF SKIN OF DEFORMED FOOT: ICD-10-CM

## 2022-12-19 DIAGNOSIS — M20.41 ACQUIRED HAMMER TOE DEFORMITY OF LESSER TOE OF BOTH FEET: ICD-10-CM

## 2022-12-19 DIAGNOSIS — M77.51 BURSITIS OF HEEL, RIGHT: ICD-10-CM

## 2022-12-19 DIAGNOSIS — M21.969 LOSS OF PROTECTIVE SENSATION OF SKIN OF DEFORMED FOOT: ICD-10-CM

## 2022-12-19 DIAGNOSIS — M77.41 METATARSALGIA OF RIGHT FOOT: Primary | ICD-10-CM

## 2022-12-19 DIAGNOSIS — E11.42 DIABETIC POLYNEUROPATHY ASSOCIATED WITH TYPE 2 DIABETES MELLITUS (H): ICD-10-CM

## 2022-12-19 DIAGNOSIS — I78.1 TELANGIECTASIAS: ICD-10-CM

## 2022-12-19 PROCEDURE — G0463 HOSPITAL OUTPT CLINIC VISIT: HCPCS | Mod: 25

## 2022-12-19 PROCEDURE — 99214 OFFICE O/P EST MOD 30 MIN: CPT | Performed by: PODIATRIST

## 2022-12-19 ASSESSMENT — PAIN SCALES - GENERAL: PAINLEVEL: MODERATE PAIN (5)
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
## 2022-12-28 ENCOUNTER — MEDICAL CORRESPONDENCE (OUTPATIENT)
Dept: HEALTH INFORMATION MANAGEMENT | Facility: HOSPITAL | Age: 67
End: 2022-12-28

## 2023-01-09 ENCOUNTER — OFFICE VISIT (OUTPATIENT)
Dept: PODIATRY | Facility: OTHER | Age: 68
End: 2023-01-09
Attending: PODIATRIST
Payer: OTHER MISCELLANEOUS

## 2023-01-09 ENCOUNTER — ANCILLARY PROCEDURE (OUTPATIENT)
Dept: GENERAL RADIOLOGY | Facility: OTHER | Age: 68
End: 2023-01-09
Attending: PODIATRIST
Payer: OTHER MISCELLANEOUS

## 2023-01-09 VITALS
HEART RATE: 109 BPM | OXYGEN SATURATION: 94 % | TEMPERATURE: 97.1 F | SYSTOLIC BLOOD PRESSURE: 146 MMHG | DIASTOLIC BLOOD PRESSURE: 84 MMHG

## 2023-01-09 DIAGNOSIS — M76.71 PERONEAL TENDINITIS OF RIGHT LOWER EXTREMITY: ICD-10-CM

## 2023-01-09 DIAGNOSIS — M77.8 EXTENSOR TENDINITIS OF FOOT: ICD-10-CM

## 2023-01-09 DIAGNOSIS — M77.51 BURSITIS OF HEEL, RIGHT: ICD-10-CM

## 2023-01-09 DIAGNOSIS — M77.41 METATARSALGIA OF RIGHT FOOT: Primary | ICD-10-CM

## 2023-01-09 DIAGNOSIS — M77.41 METATARSALGIA OF RIGHT FOOT: ICD-10-CM

## 2023-01-09 PROCEDURE — 73630 X-RAY EXAM OF FOOT: CPT | Mod: TC | Performed by: RADIOLOGY

## 2023-01-09 PROCEDURE — 73650 X-RAY EXAM OF HEEL: CPT | Mod: TC | Performed by: RADIOLOGY

## 2023-01-09 PROCEDURE — 99214 OFFICE O/P EST MOD 30 MIN: CPT | Performed by: PODIATRIST

## 2023-01-09 NOTE — PATIENT INSTRUCTIONS
-Start slowly increasing weight on the RIGHT foot in a regular tennis shoe. Do not walk without the tennis shoe on your foot. If your foot hurts too much, wear the boot. Do not increase activity if it is causing pain.   -A new appointment will be scheduled for you to see the orthotist, Anat Alonso, for orthotic modifications and to be fit for compression socks.  -Physical therapy will be ordered through [facility] in [place].

## 2023-01-09 NOTE — PROGRESS NOTES
Occupational Visit       SUBJECTIVE:  Mary Jean Lesch, 67 year old, female is seen with a  for follow up of occupational injury of her RIGHT foot. Date of injury is 09/11/2022. Her employer is [employer]    She has been in a long leg CAM boot on the RIGHT foot around 11/17/2022 after an MRI confirmed contusion of the cuboid with a possible ligament avulsion injury.     She says her RIGHT foot feels some pain on the RIGHT lateral ankle and lateral hindfoot when standing on it without the boot. She does not always wear the boot at home. She has been sick for about five days and she gets some pain on the lateral foot and plantar central heel when standing / walking to the bathroom on her foot.     She says she didn't try a gel heel cup in the RIGHT foot but she couldn't find one. Her plantar central heel still hurts.    She was supposed to see the orthotist, Anat Alonso, for a fitting for compression socks, but she missed the appointment because she got sick. She says she wasn't sure how to reschedule the appointment. She is wondering how she can get her foot feeling better.    Additionally, patient says she has a LEFT knee injury from the same fall that caused her RIGHT foot injury. She has had LEFT knee pain and she is out of work still for her LEFT knee pain as well.    No further pedal complaints today.       History of Injury:  Patient says she on 09/11/2022. She was sitting on a sofa at work. She was frightened as someone came into the room abruptly. She quickly tried to stand up and her RIGHT foot got caught beneath the couch. She fell forward and she had pain in her RIGHT foot and RIGHT ankle. She did not have the RIGHT foot evaluated. The ankle pain resolved but the foot pain never improved. She had been wearing a Velcro strap ankle brace on the RIGHT ankle which decreased a lot of pain in her foot. However, when she was home without the splint on her foot, then she has a lot of pain by  dinner time.    Patient says she recently had a follow-up with her PCP who advised her to see podiatry. She had her routine podiatry appointment scheduled already in which she presented with the history of this new injury.      Musculoskeletal problem/pain      Duration: Since injury on 09/11/2022    Description  Location: RIGHT dorsal midfoot and dorsal lateral midfoot pain    Intensity:  Mild-to-moderate depending on activity    Accompanying signs and symptoms: Pain of foot when walking    History  Previous similar problem: no   Previous evaluation:  x-ray and MRI    Precipitating or alleviating factors:  Trauma or overuse: YES  Aggravating factors include: standing and walking    Therapies tried and outcome: immobilization        Allergies   Allergen Reactions     Strawberry Anaphylaxis     Aspirin      No Clinical Screening - See Comments Hives     Trees, dandelions, pussy willows, and flowers, strawberry             ROS: 10 point ROS neg other than the symptoms noted above in the HPI.  EXAM  Constitutional: healthy, alert and no distress    Psychiatric: mentation appears normal and affect normal/bright      OBJECTIVE:  Vitals:    01/09/23 1030   BP: (!) 146/84   Pulse: 109   Temp: 97.1  F (36.2  C)   SpO2: 94%       Exam:      RIGHT FOOT FOCUSED      VASCULAR:  -Dorsalis pedis pulse +2/4   -Posterior tibial pulse +2/4  -Capillary refill time < 3 seconds to hallux   -Telangiectasias to foot and ankle    NEURO:  -Positive for paresthesias and burning to foot  -Epicritic and protective sensation ABSENT to the foot    DERM:  -Toenails normal length, thickened, dystrophic and discolored x 9  ---RIGHT hallux toenail absent (previous matrixectomy)  -Hair growth absent to dorsal foot  -Skin thin to foot    MSK:  -Tenderness on palpation to dorsal and lateral cuboid of the RIGHT foot  -Pain on palpation to the RIGHT plantar central heel  -Tenderness on palpation to the dorsal 4th and 5th TMTJ, RIGHT foot  -Pain on  palpation to the peroneal tendon just distal to the lateral malleolus of the RIGHT foot  -Minimal pain remaining along the RIGHT dorsal metatarsals 3-5    -No pain remaining on the extensor tendons in the midfoot, dorsal proximal foot and following the 3rd, 4th and 5th extensor tendons to the MPTJ of each toe    -No further tenderness on palpation to the ATFL and sinus tarsi of the RIGHT foot    -Mild adductovarus of the bilateral fifth digit with mild bony prominence to the medial IPJ of the RIGHT fifth digit    -Mild DORSIFLEXION contraction of the MTPJs 2-5 of RIGHT foot with 2nd and 3rd digit most prominently dorsiflexed and flexion contracture of DIPJ of digits 2-4 of RIGHT foot    -Muscle strength of ankles +5/5 for dorsiflexion, plantarflexion, ABDUction and ADDuction        Labs: No results found for this or any previous visit (from the past 24 hour(s)).     MRI RIGHT FOOT 11/15/2022                                                                     IMPRESSION:   Examination of the hindfoot and portions of the midfoot are limited by  artifact arising from metallic hardware in the calcaneus. Sinus Tarsi  is not seen.     There is localized marrow edema seen in the lateral aspect of the  cuboid, subjacent to the dorsal tarsometatarsal ligament attachment  without evidence of well-defined fracture or tear of the ligament.  This edema may related to an avulsive type injury involving the dorsal  tarsometatarsal ligament and/or the insertion of the posterior  tibialis tendon.     DMITRIY MUNSON MD          --------------------------------------------    ASSESSMENT/PLAN:    (M77.41) Metatarsalgia of right foot (primary encounter diagnosis)    (M77.8) Extensor tendinitis of foot     (M76.71) Peroneal tendinitis of right lower extremity      (M77.51) Bursitis of heel, right    -Patient in agreement with the above treatment plan and all of patient's questions were answered.    -Patient's overall pain has improved,  but she still gets pain in the plantar central heel when she is walking with and without the boot.    -New radiographs ordered on 01/09/2023 -- patient does have a plantar heel spur. This has likely been present for years and may be more irritated at this time because of the firm boot without a supportive heel. Will see if offloading her insert in her shoe helps reduce this pain and irritation. Patient will be called with the results.  -Patient also has pain along the peroneal tendons and the lateral foot. Physical therapy may help reduce this pain and increase strength in her foot and ankle. She also has a lot of LEFT knee pain which may be placing extra stress on the RIGHT foot and ankle.    -Physical therapy ordered through [facility] in [place] per patient request on 01/09/2023: Will have PT work on ROM of the foot and ankle, strengthening of the ankle and intrinsic muscles of the foot, proprioception, gait training,    -Compression socks: Advised patient to wear compression socks all day (especially when working) to decrease LOWER EXTREMITY edema b/l. Educated patient about applying the socks first thing in the morning before getting out of bed and removing them at night when the feet are elevated in bed. She is being fit for compression socks by the orthotist, Anat Alonso this upcoming week.    -Orthotist: Patient's orthotist appointment was rescheduled for later this week on 01/09/2023. Sent a message to the orthotist, Anat Alonso, to please add a gel heel cup, offload the plantar central heel, possibly tilt the RIGHT lateral foot into a very mild valgus to offload the lateral foot or to consider a cuboid notch to reduce the pressure on the plantar lateral cuboid.  ---Sent message to the orthotist, Anat Alonso and discussed with her in person what is needed for the patient's compression and CMOs.    Offloading: Patient has been in a CAM boot for 7.5 weeks. She is to start slowly transitioning into regular  shoe gear. This should be in firm tennis shoes only. Time should slowly increase in regular shoes. She should go back to a boot whenever her pain suddenly increases. She is in agreement with this plan.    -From podiatry's standpoint, if possible, will see if patient may start working reduced hours in four weeks. This will depend on her pain and improvement with physical therapy and the orthotic adjustment.   -----------------------------------------------------------------  Total time spent preparing to see the patient, review of chart, obtaining history and physical examination, review of x-rays, discussion of treatment options, discussion of CMO adjustments and compression with the orthlsia, education, discussion with patient and documenting in Epic / EMR was 34 minutes. This did not include procedure time.  All time involved was spent on the day of service for the patient (the same day as the patient's appointment).    Patient would like to call as needed for a diabetic foot exam and high risk nail debridement at this time      Return to clinic four weeks to evaluate RIGHT lateral foot pain after starting PT, after orthotic adjustments and after being fit for compression socks      Lexi Felix DPM

## 2023-01-19 ENCOUNTER — HOSPITAL ENCOUNTER (OUTPATIENT)
Dept: NUCLEAR MEDICINE | Facility: HOSPITAL | Age: 68
Discharge: HOME | End: 2023-01-19
Attending: ORTHOPAEDIC SURGERY
Payer: OTHER MISCELLANEOUS

## 2023-01-19 ENCOUNTER — HOSPITAL ENCOUNTER (OUTPATIENT)
Dept: GENERAL RADIOLOGY | Facility: HOSPITAL | Age: 68
Discharge: HOME | End: 2023-01-19
Attending: RADIOLOGY | Admitting: RADIOLOGY
Payer: MEDICARE

## 2023-01-19 DIAGNOSIS — M25.362 INSTABILITY OF LEFT KNEE JOINT: ICD-10-CM

## 2023-01-19 DIAGNOSIS — T14.90XA INJURY: ICD-10-CM

## 2023-01-19 DIAGNOSIS — R52 PAIN: ICD-10-CM

## 2023-01-19 PROCEDURE — 73562 X-RAY EXAM OF KNEE 3: CPT | Mod: LT

## 2023-01-19 PROCEDURE — A9503 TC99M MEDRONATE: HCPCS | Performed by: RADIOLOGY

## 2023-01-19 PROCEDURE — 78315 BONE IMAGING 3 PHASE: CPT

## 2023-01-19 PROCEDURE — 343N000001 HC RX 343: Performed by: RADIOLOGY

## 2023-01-19 RX ORDER — TC 99M MEDRONATE 20 MG/10ML
20-30 INJECTION, POWDER, LYOPHILIZED, FOR SOLUTION INTRAVENOUS ONCE
Status: COMPLETED | OUTPATIENT
Start: 2023-01-19 | End: 2023-01-19

## 2023-01-19 RX ADMIN — TC 99M MEDRONATE 25.3 MCI.: 20 INJECTION, POWDER, LYOPHILIZED, FOR SOLUTION INTRAVENOUS at 09:28

## 2023-01-31 ENCOUNTER — MEDICAL CORRESPONDENCE (OUTPATIENT)
Dept: HEALTH INFORMATION MANAGEMENT | Facility: HOSPITAL | Age: 68
End: 2023-01-31

## 2023-02-01 ENCOUNTER — MEDICAL CORRESPONDENCE (OUTPATIENT)
Dept: MRI IMAGING | Facility: HOSPITAL | Age: 68
End: 2023-02-01

## 2023-02-06 ENCOUNTER — OFFICE VISIT (OUTPATIENT)
Dept: PODIATRY | Facility: OTHER | Age: 68
End: 2023-02-06
Attending: PODIATRIST
Payer: OTHER MISCELLANEOUS

## 2023-02-06 VITALS
DIASTOLIC BLOOD PRESSURE: 66 MMHG | HEART RATE: 86 BPM | SYSTOLIC BLOOD PRESSURE: 160 MMHG | TEMPERATURE: 97.7 F | OXYGEN SATURATION: 97 %

## 2023-02-06 DIAGNOSIS — M77.51 BURSITIS OF HEEL, RIGHT: ICD-10-CM

## 2023-02-06 DIAGNOSIS — M79.671 PAIN OF RIGHT HEEL: ICD-10-CM

## 2023-02-06 DIAGNOSIS — M76.71 PERONEAL TENDINITIS OF RIGHT LOWER EXTREMITY: Primary | ICD-10-CM

## 2023-02-06 PROCEDURE — 99213 OFFICE O/P EST LOW 20 MIN: CPT | Performed by: PODIATRIST

## 2023-02-06 ASSESSMENT — PAIN SCALES - GENERAL: PAINLEVEL: EXTREME PAIN (8)

## 2023-02-06 NOTE — PROGRESS NOTES
Occupational Visit       SUBJECTIVE:  Mary Jean Lesch, 67 year old, female is seen with a  for follow up of occupational injury of her RIGHT foot. Date of injury is 09/11/2022. Her employer is [employer]    She started physical therapy in mid-to-late January, 2023, at [facility] in [place].    She was seen by the orthotist, Anat Alonso and she received her new compression socks on 02/06/2023.    She is wondering if she can get replacement shoes for her DM shoes. She says the heel of the shoe pulled off when her heel got caught under the couch when she fell at work. Her regular DM shoes should be in by the end of the week in early February, 2023.    She still has LEFT knee pain from the same injury.    No further pedal complaints today.       History of Injury:  Patient says she on 09/11/2022. She was sitting on a sofa at work. She was frightened as someone came into the room abruptly. She quickly tried to stand up and her RIGHT foot got caught beneath the couch. She fell forward and she had pain in her RIGHT foot and RIGHT ankle. She did not have the RIGHT foot evaluated. The ankle pain resolved but the foot pain never improved. She had been wearing a Velcro strap ankle brace on the RIGHT ankle which decreased a lot of pain in her foot. However, when she was home without the splint on her foot, then she has a lot of pain by dinner time.    Patient says she recently had a follow-up with her PCP who advised her to see podiatry. She had her routine podiatry appointment scheduled already in which she presented with the history of this new injury.      Musculoskeletal problem/pain      Duration: Since injury on 09/11/2022    Description  Location: RIGHT dorsal midfoot and dorsal lateral midfoot pain    Intensity:  Mild-to-moderate depending on activity    Accompanying signs and symptoms: Pain of foot when walking    History  Previous similar problem: no   Previous evaluation:  x-ray and  MRI    Precipitating or alleviating factors:  Trauma or overuse: YES  Aggravating factors include: standing and walking    Therapies tried and outcome: immobilization        Allergies   Allergen Reactions     Strawberry Anaphylaxis     Aspirin      No Clinical Screening - See Comments Hives     Trees, dandelions, pussy willows, and flowers, strawberry             ROS: 10 point ROS neg other than the symptoms noted above in the HPI.  EXAM  Constitutional: healthy, alert and no distress    Psychiatric: mentation appears normal and affect normal/bright      OBJECTIVE:  Vitals:    02/06/23 1207   BP: (!) 160/66   Pulse: 86   Temp: 97.7  F (36.5  C)   SpO2: 97%       Exam:      RIGHT FOOT FOCUSED      VASCULAR:  -Dorsalis pedis pulse +2/4   -Posterior tibial pulse +2/4  -Capillary refill time < 3 seconds to hallux   -Telangiectasias to foot and ankle    NEURO:  -Positive for paresthesias and burning to foot  -Epicritic and protective sensation ABSENT to the foot    DERM:  -Toenails normal length, thickened, dystrophic and discolored x 9  ---RIGHT hallux toenail absent (previous matrixectomy)  -Hair growth absent to dorsal foot  -Skin thin to foot    MSK:  -Minimal remaining tenderness on palpation to dorsal and lateral cuboid of the RIGHT foot  -Pain on palpation to the RIGHT plantar central heel  -Pain on palpation to the peroneal tendon just distal to the lateral malleolus of the RIGHT foot    -Minimal pain remaining along the RIGHT dorsal metatarsals 3-5    -No pain remaining on the extensor tendons in the midfoot, dorsal proximal foot and following the 3rd, 4th and 5th extensor tendons to the MPTJ of each toe    -Mild adductovarus of the bilateral fifth digit with mild bony prominence to the medial IPJ of the RIGHT fifth digit    -Mild DORSIFLEXION contraction of the MTPJs 2-5 of RIGHT foot with 2nd and 3rd digit most prominently dorsiflexed and flexion contracture of DIPJ of digits 2-4 of RIGHT foot    -Muscle  strength of ankles +5/5 for dorsiflexion, plantarflexion, ABDUction and ADDuction        Labs: No results found for this or any previous visit (from the past 24 hour(s)).     MRI RIGHT FOOT 11/15/2022                                                                     IMPRESSION:   Examination of the hindfoot and portions of the midfoot are limited by  artifact arising from metallic hardware in the calcaneus. Sinus Tarsi  is not seen.     There is localized marrow edema seen in the lateral aspect of the  cuboid, subjacent to the dorsal tarsometatarsal ligament attachment  without evidence of well-defined fracture or tear of the ligament.  This edema may related to an avulsive type injury involving the dorsal  tarsometatarsal ligament and/or the insertion of the posterior  tibialis tendon.     DMITRIY MUNSON MD          --------------------------------------------    ASSESSMENT/PLAN:    (M76.71) Peroneal tendinitis of right lower extremity  (primary encounter diagnosis)    (M79.671) Pain of right heel    (M77.51) Bursitis of heel, right        -Patient in agreement with the above treatment plan and all of patient's questions were answered.    -Patient's overall pain has improved and she thinks she is making baby steps, but she still has moderate foot/ankle pain and she does not think she can stand or walk for even an hour without pain.      -Physical therapy ordered through [facility] in [place] per patient request on 01/09/2023. She should continue working on PT. She is advised to discuss with PT today at her appointment on 02/06/2023 if they could give her stretches and exercises to work on her own at home.    -Compression socks: Patient received through the orthotist, Anat Alonso on 02/06/2023. She should start wearing these all day every day and remove only at night to control edema.    -Orthotist: The orthotist, Anat Alonso, made a heel cut out and added a mild heel valgus tilt on the RIGHT foot inserts on  02/06/2023. She will add these modifications to her new DM shoes and inserts later this week if it helps reduce her pain.  ---Continue carbon fiber plate from mid January, 2023.    Offloading: Patient had been in a CAM boot for 7.5 weeks. She is now in a shoe full time. She should occasionally  Wear the CAM boot only as needed if her pain moderately increases.    -From podiatry's standpoint, patient may walk up to ten minutes for hour and do any sitting jobs for work. She is not yet ready to return to work full time because of pain.  -Patient's compensation for LEFT knee pain may also be affecting pain in the RIGHT foot.  -----------------------------------------------------------------  Total time spent preparing to see the patient, review of chart, obtaining history and physical examination, review of x-rays, discussion of treatment options, discussion of CMO adjustments and compression with the orthlsia, education, discussion with patient and documenting in Epic / EMR was 27 minutes. This did not include procedure time.  All time involved was spent on the day of service for the patient (the same day as the patient's appointment).    Patient would like to call as needed for a diabetic foot exam and high risk nail debridement at this time      Return to clinic four weeks to evaluate RIGHT lateral foot pain after starting PT, after orthotic adjustments and after being fit for compression socks      Lexi Felix DPM

## 2023-02-14 ENCOUNTER — MEDICAL CORRESPONDENCE (OUTPATIENT)
Dept: INTERVENTIONAL RADIOLOGY/VASCULAR | Facility: HOSPITAL | Age: 68
End: 2023-02-14

## 2023-02-16 ENCOUNTER — HOSPITAL ENCOUNTER (OUTPATIENT)
Facility: HOSPITAL | Age: 68
End: 2023-02-16
Attending: RADIOLOGY | Admitting: RADIOLOGY
Payer: MEDICARE

## 2023-02-22 RX ORDER — PREDNISOLONE SODIUM PHOSPHATE 15 MG/5ML
SOLUTION ORAL
COMMUNITY
Start: 2022-12-05

## 2023-02-22 RX ORDER — METHYLPHENIDATE HYDROCHLORIDE 27 MG/1
TABLET ORAL
COMMUNITY
Start: 2022-05-16 | End: 2023-09-26

## 2023-02-22 RX ORDER — MIRTAZAPINE 15 MG/1
TABLET, FILM COATED ORAL
COMMUNITY
Start: 2023-01-19 | End: 2023-09-26

## 2023-02-22 RX ORDER — LEVOTHYROXINE SODIUM 150 UG/1
150 TABLET ORAL
COMMUNITY
Start: 2022-10-26 | End: 2023-09-26

## 2023-02-22 RX ORDER — VENLAFAXINE HYDROCHLORIDE 37.5 MG/1
CAPSULE, EXTENDED RELEASE ORAL
COMMUNITY
Start: 2023-01-19 | End: 2023-09-26

## 2023-02-22 RX ORDER — BUDESONIDE AND FORMOTEROL FUMARATE DIHYDRATE 160; 4.5 UG/1; UG/1
AEROSOL RESPIRATORY (INHALATION)
COMMUNITY
Start: 2022-06-29 | End: 2023-09-26

## 2023-02-22 RX ORDER — LOPERAMIDE HCL 2 MG
CAPSULE ORAL
COMMUNITY
Start: 2022-08-16 | End: 2023-09-26

## 2023-02-22 RX ORDER — METHYLPHENIDATE HYDROCHLORIDE 27 MG/1
54 TABLET ORAL
COMMUNITY
Start: 2022-08-26 | End: 2023-09-26

## 2023-03-07 ENCOUNTER — OFFICE VISIT (OUTPATIENT)
Dept: PODIATRY | Facility: OTHER | Age: 68
End: 2023-03-07
Attending: PODIATRIST
Payer: OTHER MISCELLANEOUS

## 2023-03-07 VITALS
SYSTOLIC BLOOD PRESSURE: 164 MMHG | TEMPERATURE: 97.9 F | DIASTOLIC BLOOD PRESSURE: 76 MMHG | HEART RATE: 94 BPM | OXYGEN SATURATION: 96 %

## 2023-03-07 DIAGNOSIS — M77.51 BURSITIS OF HEEL, RIGHT: ICD-10-CM

## 2023-03-07 DIAGNOSIS — M76.71 PERONEAL TENDINITIS OF RIGHT LOWER EXTREMITY: Primary | ICD-10-CM

## 2023-03-07 DIAGNOSIS — M79.671 PAIN OF RIGHT HEEL: ICD-10-CM

## 2023-03-07 PROCEDURE — 99215 OFFICE O/P EST HI 40 MIN: CPT | Performed by: PODIATRIST

## 2023-03-07 ASSESSMENT — PAIN SCALES - GENERAL: PAINLEVEL: EXTREME PAIN (8)

## 2023-03-07 NOTE — PROGRESS NOTES
Occupational Visit       SUBJECTIVE:  Mary Jean Lesch, 67 year old, female is seen with a  for follow up of occupational injury of her RIGHT foot. Date of injury is 09/11/2022. Her employer is [employer]    She started physical therapy in mid-to-late January, 2023, at [facility] in [place]. She had her final session on Friday, 03/03/2023. Her therapist was Derik, and he has requested more sessions for the patient. She says the PT took time to start working, but she does think it started to improve her pain. He has her working on exercises on her own at home. She tries to do the exercises every day. She was told her balance is better than it was when she started.    She received her new orthotics, shoes and compression socks from the orthotist, Anat Alonso. She received the socks on 02/06/2023 and the shoes around mid February, 2023. She is wearing the shoes and inserts when she is outside the house. At home, she is wearing her other DM shoes. She was told by PT not to wear them at home because she felt like they were falling apart. She had a pair of [employer] tennis shoes at home, so she has been wearing those at home. She wears her orthotics and another carbon fiber plate to wear in her shoes at home.    She feels like her pain has improved, but it is not resolved when she is wearing her orthotics.    She also has noticed a popping of her foot/ankle. She says that at PT has been working on DORSIFLEXION and ABDuction/ADDuction of her ankle. The first time she tried to do her exercises at home, she stood up out of her chair and she felt a snap in her RIGHT lateral ankle and her RIGHT plantar foot. The pain lasted all night. The pain was improved the next day. She said she had tenderness the next day. This occurred maybe three weeks ago around 02/14/2023. This occurred a second time last week around 02/28/2023 after a day on her feet when she was out shopping. It does not feel like the  ankle is rolling, she just feels a sudden snap.     Patient also still has plantar central heel pain, but this has started to improve after she has walked on her foot for few minutes. Pain is usually just first step pain after resting for a period of time.    Overall, patient thinks she has made a little progress to improving. However, she is unable to stand for long periods of time without feeling like she has to sit down because of pain in the foot.     No further pedal complaints today.       History of Injury:  Patient says she on 09/11/2022. She was sitting on a sofa at work. She was frightened as someone came into the room abruptly. She quickly tried to stand up and her RIGHT foot got caught beneath the couch. She fell forward and she had pain in her RIGHT foot and RIGHT ankle. She did not have the RIGHT foot evaluated. The ankle pain resolved but the foot pain never improved. She had been wearing a Velcro strap ankle brace on the RIGHT ankle which decreased a lot of pain in her foot. However, when she was home without the splint on her foot, then she has a lot of pain by dinner time.    Patient says she recently had a follow-up with her PCP who advised her to see podiatry. She had her routine podiatry appointment scheduled already in which she presented with the history of this new injury.      Musculoskeletal problem/pain      Duration: Since injury on 09/11/2022    Description  Location: RIGHT dorsal midfoot and dorsal lateral midfoot pain    Intensity:  Mild-to-moderate depending on activity    Accompanying signs and symptoms: Pain of foot when walking    History  Previous similar problem: no   Previous evaluation:  x-ray and MRI    Precipitating or alleviating factors:  Trauma or overuse: YES  Aggravating factors include: standing and walking    Therapies tried and outcome: immobilization        Allergies   Allergen Reactions     Strawberry Anaphylaxis     Aspirin      No Clinical Screening - See Comments  Hives     Trees, dandelions, pussy willows, and flowers, strawberry           ROS: 10 point ROS neg other than the symptoms noted above in the HPI.  EXAM  Constitutional: healthy, alert and no distress    Psychiatric: mentation appears normal and affect normal/bright      OBJECTIVE:  Vitals:    03/07/23 1420   BP: (!) 164/76   Pulse: 94   Temp: 97.9  F (36.6  C)   SpO2: 96%       Exam:      RIGHT FOOT FOCUSED      VASCULAR:  -Dorsalis pedis pulse +2/4   -Posterior tibial pulse +2/4  -Capillary refill time < 3 seconds to hallux   -Telangiectasias to foot and ankle    NEURO:  -Positive for paresthesias and burning to foot  -Epicritic and protective sensation ABSENT to the foot    DERM:  -Toenails normal length, thickened, dystrophic and discolored x 9  ---RIGHT hallux toenail absent (previous matrixectomy)  -Hair growth absent to dorsal foot  -Skin thin to foot    MSK:  -Tenderness on palpation to dorsal, plantar and lateral cuboid of the RIGHT foot  -Pain on palpation to the RIGHT plantar central heel  -Pain on palpation to the peroneal tendon just distal to the lateral malleolus of the RIGHT foot and extending along the course of the peroneus longus to the plantar central midfoot    -Minimal pain remaining along the RIGHT dorsal metatarsals 3-5    -No pain remaining on the extensor tendons in the midfoot, dorsal proximal foot and following the 3rd, 4th and 5th extensor tendons to the MPTJ of each toe    -Mild adductovarus of the bilateral fifth digit with mild bony prominence to the medial IPJ of the RIGHT fifth digit    -Mild DORSIFLEXION contraction of the MTPJs 2-5 of RIGHT foot with 2nd and 3rd digit most prominently dorsiflexed and flexion contracture of DIPJ of digits 2-4 of RIGHT foot    -Muscle strength of ankles +5/5 for dorsiflexion, plantarflexion, ABDUction and ADDuction        Labs: No results found for this or any previous visit (from the past 24 hour(s)).     MRI RIGHT FOOT 11/15/2022                                                                      IMPRESSION:   Examination of the hindfoot and portions of the midfoot are limited by  artifact arising from metallic hardware in the calcaneus. Sinus Tarsi  is not seen.     There is localized marrow edema seen in the lateral aspect of the  cuboid, subjacent to the dorsal tarsometatarsal ligament attachment  without evidence of well-defined fracture or tear of the ligament.  This edema may related to an avulsive type injury involving the dorsal  tarsometatarsal ligament and/or the insertion of the posterior  tibialis tendon.     DMITRIY MUNSON MD          --------------------------------------------    ASSESSMENT/PLAN:    (M76.71) Peroneal tendinitis of right lower extremity  (primary encounter diagnosis)    (M79.671) Pain of right heel    (M77.51) Bursitis of heel, right        -Patient in agreement with the above treatment plan and all of patient's questions were answered.    -Patient's overall pain has mildly improved, but she has not had a lot of successful pain relief since her last appointment. She thinks PT took a long time to start helping, but it did start to improve her pain. Her last session was on 03/03/2023 and she is waiting to hear if she was approved for more PT. She has a lace-up / strapped ankle brace, but she has not been wearing the brace. She is advised to try wearing a compression ankle sleeve instead to stabilize the ankle structures and provide mild compression without weakening the ankle with a thick ankle brace.  ---Order placed for the ankle brace on 03/07/2023. The orthotist, Anat Alonso, kindly sized the patient for the brace today and she will order the brace if it is approved for the patient.    -Physical therapy re-ordered through [facility] in [place] on 03/p07/2023 (previously ordered on 01/09/2023). Will have PT start working on motions that the patient performs at work such as bending, squatting, walking, and work conditioning  "to see if her pain reduces as she is working more on these activities.     -Orthotics: The orthotist, Anat Alonso, made a heel cut out and added a mild heel valgus tilt on the RIGHT foot inserts on 02/06/2023. She will add these modifications to her new DM shoes and inserts later. Will consider adding a cuboid notch if the patient continues to feel a \"snap\" in her foot/ankle. The snap may be a subluxation of the peroneal tendon near the cuboid. If the PT does not reduce the snapping, a shoe insert modification may be needed. Discussion the ankle sleeve with the orthotist, Anat Alonso, and she was in agreement with the brace. She is in agreement to add the cuboid notch is needed if the \"snapping\" of the patient's foot/ankle continues.  ---Continue carbon fiber plate from mid January, 2023.    Offloading: Patient had been in a CAM boot for 7.5 weeks. She is now in a shoe full time. Will continue to work on strengthening of the foot and akle.    -From podiatry's standpoint, patient may walk up to ten minutes for hour and do any sitting jobs for work. She is not yet ready to return to work full time because of pain.  Goal for patient: To start working on work activities with PT to reduce pain and increase strength in the foot and ankle. The goal will be to see if patient is ready to return to work for at least two hours a shift following her next appointment.    -Patient and her work compensation representative asked if an occupational medicine referral would be helpful once she completes physical therapy. This may be a good option for her, but she is advised to discuss this with her PCP or orthopaedic since the referral could then be made to work on the full body (she complains of shoulder and knee pain from the injury). This provider is limited to treating only her foot and ankle. She will consider her options based on how she is recovering with the above treatment " options.    -----------------------------------------------------------------  Total time spent preparing to see the patient, review of chart, obtaining history and physical examination, review of x-rays, discussion of treatment options, discussion of CMO adjustments and compression with the orthlsia, education, discussion with patient and documenting in Epic / EMR was 50 minutes. Detailed discussion time was spent with the patient to figure out goals and to her treatment options that may reduce her pain. All time involved was spent on the day of service for the patient (the same day as the patient's appointment).    Patient would like to call as needed for a diabetic foot exam and high risk nail debridement at this time      Return to clinic four weeks to evaluate RIGHT lateral foot pain after starting PT, after orthotic adjustments and after being fit for compression socks      Lexi Felix DPM

## 2023-03-08 ENCOUNTER — MEDICAL CORRESPONDENCE (OUTPATIENT)
Dept: HEALTH INFORMATION MANAGEMENT | Facility: HOSPITAL | Age: 68
End: 2023-03-08

## 2023-03-09 RX ORDER — METHYLPHENIDATE HYDROCHLORIDE 20 MG/1
TABLET ORAL
COMMUNITY
Start: 2023-03-03 | End: 2023-09-26

## 2023-03-09 RX ORDER — MIRTAZAPINE 30 MG/1
TABLET, FILM COATED ORAL
COMMUNITY
Start: 2023-03-03

## 2023-03-21 ENCOUNTER — HOSPITAL ENCOUNTER (OUTPATIENT)
Dept: MRI IMAGING | Facility: HOSPITAL | Age: 68
Discharge: HOME | End: 2023-03-21
Attending: ORTHOPAEDIC SURGERY | Admitting: ORTHOPAEDIC SURGERY
Payer: MEDICARE

## 2023-03-21 DIAGNOSIS — R53.1 WEAKNESS: ICD-10-CM
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
## 2023-03-21 PROCEDURE — 73221 MRI JOINT UPR EXTREM W/O DYE: CPT | Mod: RT

## 2023-03-28 ENCOUNTER — MEDICAL CORRESPONDENCE (OUTPATIENT)
Dept: HEALTH INFORMATION MANAGEMENT | Facility: CLINIC | Age: 68
End: 2023-03-28

## 2023-04-03 ENCOUNTER — DOCUMENTATION ONLY (OUTPATIENT)
Dept: INTERVENTIONAL RADIOLOGY/VASCULAR | Facility: HOSPITAL | Age: 68
End: 2023-04-03

## 2023-04-03 NOTE — PROGRESS NOTES
Spoke with patient in regards to med list and had an abx on it. Pt is not currently on an antibiotic.  Informed patient to contact us if she does go on a antibiotic or steroid needs to contact us and we would have to reschedule. Cant be on either one 2 weeks prior to the procedure.

## 2023-04-12 ENCOUNTER — OFFICE VISIT (OUTPATIENT)
Dept: PODIATRY | Facility: OTHER | Age: 68
End: 2023-04-12
Attending: PODIATRIST
Payer: OTHER MISCELLANEOUS

## 2023-04-12 ENCOUNTER — OFFICE VISIT (OUTPATIENT)
Dept: PODIATRY | Facility: OTHER | Age: 68
End: 2023-04-12
Attending: PODIATRIST
Payer: MEDICARE

## 2023-04-12 VITALS
SYSTOLIC BLOOD PRESSURE: 148 MMHG | TEMPERATURE: 98.1 F | DIASTOLIC BLOOD PRESSURE: 59 MMHG | OXYGEN SATURATION: 98 % | HEART RATE: 91 BPM

## 2023-04-12 VITALS
HEART RATE: 91 BPM | TEMPERATURE: 98.1 F | DIASTOLIC BLOOD PRESSURE: 59 MMHG | SYSTOLIC BLOOD PRESSURE: 148 MMHG | OXYGEN SATURATION: 98 %

## 2023-04-12 DIAGNOSIS — M76.71 PERONEAL TENDINITIS OF RIGHT LOWER EXTREMITY: Primary | ICD-10-CM

## 2023-04-12 DIAGNOSIS — E11.9 DIABETES MELLITUS TYPE 2, NONINSULIN DEPENDENT (H): ICD-10-CM

## 2023-04-12 DIAGNOSIS — M25.571 SINUS TARSI SYNDROME OF RIGHT ANKLE: ICD-10-CM

## 2023-04-12 DIAGNOSIS — M77.51 BURSITIS OF HEEL, RIGHT: ICD-10-CM

## 2023-04-12 DIAGNOSIS — M21.969 LOSS OF PROTECTIVE SENSATION OF SKIN OF DEFORMED FOOT: ICD-10-CM

## 2023-04-12 DIAGNOSIS — R20.8 LOSS OF PROTECTIVE SENSATION OF SKIN OF DEFORMED FOOT: ICD-10-CM

## 2023-04-12 DIAGNOSIS — L60.3 ONYCHODYSTROPHY: Primary | ICD-10-CM

## 2023-04-12 DIAGNOSIS — E11.42 DIABETIC POLYNEUROPATHY ASSOCIATED WITH TYPE 2 DIABETES MELLITUS (H): ICD-10-CM

## 2023-04-12 DIAGNOSIS — M79.671 PAIN OF RIGHT HEEL: ICD-10-CM

## 2023-04-12 PROCEDURE — 99213 OFFICE O/P EST LOW 20 MIN: CPT | Mod: 25 | Performed by: PODIATRIST

## 2023-04-12 PROCEDURE — 2894A PR VOIDCORRECT: CPT | Performed by: PODIATRIST

## 2023-04-12 PROCEDURE — 20605 DRAIN/INJ JOINT/BURSA W/O US: CPT | Mod: RT | Performed by: PODIATRIST

## 2023-04-12 PROCEDURE — G0463 HOSPITAL OUTPT CLINIC VISIT: HCPCS | Mod: 25

## 2023-04-12 PROCEDURE — 20550 NJX 1 TENDON SHEATH/LIGAMENT: CPT | Mod: XU | Performed by: PODIATRIST

## 2023-04-12 PROCEDURE — 11721 DEBRIDE NAIL 6 OR MORE: CPT | Performed by: PODIATRIST

## 2023-04-12 RX ORDER — DEXAMETHASONE SODIUM PHOSPHATE 4 MG/ML
4 INJECTION, SOLUTION INTRA-ARTICULAR; INTRALESIONAL; INTRAMUSCULAR; INTRAVENOUS; SOFT TISSUE ONCE
Status: COMPLETED | OUTPATIENT
Start: 2023-04-12 | End: 2023-04-12

## 2023-04-12 RX ORDER — TRIAMCINOLONE ACETONIDE 40 MG/ML
40 INJECTION, SUSPENSION INTRA-ARTICULAR; INTRAMUSCULAR ONCE
Status: COMPLETED | OUTPATIENT
Start: 2023-04-12 | End: 2023-04-12

## 2023-04-12 RX ADMIN — TRIAMCINOLONE ACETONIDE 40 MG: 40 INJECTION, SUSPENSION INTRA-ARTICULAR; INTRAMUSCULAR at 13:04

## 2023-04-12 RX ADMIN — DEXAMETHASONE SODIUM PHOSPHATE 4 MG: 4 INJECTION, SOLUTION INTRA-ARTICULAR; INTRALESIONAL; INTRAMUSCULAR; INTRAVENOUS; SOFT TISSUE at 13:03

## 2023-04-12 ASSESSMENT — PAIN SCALES - GENERAL
PAINLEVEL: MODERATE PAIN (5)
PAINLEVEL: MODERATE PAIN (5)

## 2023-04-12 NOTE — PROGRESS NOTES
Occupational Visit     SUBJECTIVE:  Mary Jean Lesch, 67 year old, female is seen with a  for follow up of occupational injury of her RIGHT foot. Date of injury is 09/11/2022. Her employer is [employer]    She started physical therapy in mid-to-late January, 2023, at [facility] in [place]. She had her final session on Friday, 03/03/2023. She was then approved for more PT and she resumed the therapy in mid March, 2023. She is going twice a week with the therapist, Derik.    She received her new orthotics, shoes and compression socks from the orthotist, Anat Alonso. She received the socks on 02/06/2023 and the shoes around mid February, 2023. She is wearing the shoes and inserts when she is outside the house. At home, she is wearing her other DM shoes.    Overall, she says she has had improvement. She says that PT is working a lot to get her foot to bend and walking. Her PT sessions are 45 minutes. She says she is not in pain at therapy, but it then becomes very painful when she gets home. She ices the foot and it recovers within an hour. She is tolerating the PT session. She is also able to go up and down her stairs with laundry for two loads, twice a week.    Most of her tenderness has been near the sinus tarsi and the plantar heel.    She never heard back about the RIGHT ankle brace.     No further pedal complaints today.         History of Injury:  Patient says she on 09/11/2022. She was sitting on a sofa at work. She was frightened as someone came into the room abruptly. She quickly tried to stand up and her RIGHT foot got caught beneath the couch. She fell forward and she had pain in her RIGHT foot and RIGHT ankle. She did not have the RIGHT foot evaluated. The ankle pain resolved but the foot pain never improved. She had been wearing a Velcro strap ankle brace on the RIGHT ankle which decreased a lot of pain in her foot. However, when she was home without the splint on her foot, then  she has a lot of pain by dinner time.    Patient says she recently had a follow-up with her PCP who advised her to see podiatry. She had her routine podiatry appointment scheduled already in which she presented with the history of this new injury.      Musculoskeletal problem/pain      Duration: Since injury on 09/11/2022    Description  Location: RIGHT dorsal midfoot and dorsal lateral midfoot pain    Intensity:  Mild-to-moderate depending on activity    Accompanying signs and symptoms: Pain of foot when walking    History  Previous similar problem: no   Previous evaluation:  x-ray and MRI    Precipitating or alleviating factors:  Trauma or overuse: YES  Aggravating factors include: standing and walking    Therapies tried and outcome: immobilization        Allergies   Allergen Reactions     Strawberry Anaphylaxis     Aspirin      No Clinical Screening - See Comments Hives     Trees, dandelions, pussy willows, and flowers, strawberry           ROS: 10 point ROS neg other than the symptoms noted above in the HPI.  EXAM  Constitutional: healthy, alert and no distress    Psychiatric: mentation appears normal and affect normal/bright      OBJECTIVE:  Vitals:    04/12/23 1217   BP: (!) 148/59   Pulse: 91   Temp: 98.1  F (36.7  C)   SpO2: 98%       Exam:      RIGHT FOOT FOCUSED      VASCULAR:  -Dorsalis pedis pulse +2/4   -Posterior tibial pulse +2/4  -Capillary refill time < 3 seconds to hallux   -Telangiectasias to foot and ankle    NEURO:  -Positive for paresthesias and burning to foot  -Epicritic and protective sensation ABSENT to the foot    DERM:  -Toenails normal length, thickened, dystrophic and discolored x 9  ---RIGHT hallux toenail absent (previous matrixectomy)  -Hair growth absent to dorsal foot  -Skin thin to foot    MSK:  -Tenderness on palpation to dorsal, plantar and lateral cuboid of the RIGHT foot  -Pain on palpation to the RIGHT plantar central heel  -Pain on palpation to the peroneal tendon just  distal to the lateral malleolus of the RIGHT foot and extending along the course of the peroneus longus to the plantar central midfoot    -Minimal pain remaining along the RIGHT dorsal metatarsals 3-5    -No pain remaining on the extensor tendons in the midfoot, dorsal proximal foot and following the 3rd, 4th and 5th extensor tendons to the MPTJ of each toe    -Mild adductovarus of the bilateral fifth digit with mild bony prominence to the medial IPJ of the RIGHT fifth digit    -Mild DORSIFLEXION contraction of the MTPJs 2-5 of RIGHT foot with 2nd and 3rd digit most prominently dorsiflexed and flexion contracture of DIPJ of digits 2-4 of RIGHT foot    -Muscle strength of ankles +5/5 for dorsiflexion, plantarflexion, ABDUction and ADDuction        Labs: No results found for this or any previous visit (from the past 24 hour(s)).     MRI RIGHT FOOT 11/15/2022                                                                     IMPRESSION:   Examination of the hindfoot and portions of the midfoot are limited by  artifact arising from metallic hardware in the calcaneus. Sinus Tarsi  is not seen.     There is localized marrow edema seen in the lateral aspect of the  cuboid, subjacent to the dorsal tarsometatarsal ligament attachment  without evidence of well-defined fracture or tear of the ligament.  This edema may related to an avulsive type injury involving the dorsal  tarsometatarsal ligament and/or the insertion of the posterior  tibialis tendon.     DMITRIY MUNSON MD          --------------------------------------------    ASSESSMENT/PLAN:    (M76.71) Peroneal tendinitis of right lower extremity  (primary encounter diagnosis)    (M79.481) Pain of right heel    (M77.51) Bursitis of heel, right        -Patient in agreement with the above treatment plan and all of patient's questions were answered.      Sinus tarsi and plantar heel pain:  -patient has had chronic pain off and on in the sinus tarsi and consistently in  the plantar heel. A steroid injection may help reduce this pain and she is in agreement with this plan.    -Steroid  injection x 1 joint into the RIGHT sinus tarsi and x 1 into the RIGHT plantar fascia and plantar bursa of the RIGHT heel: Obtained written and verbal consent from patient for a steroid injection into the heel of the Left foot. Reviewed risks and benefits of the injection. Informed patient that the injection may decrease pain long-term, short-term, or not at all. A steroid injection can potentially weaken the surrounding structures of the injected site such as weaken the insertion of nearby tendons and cartilage or thin cartilage. The goal of the injection, however, is to reduce the inflammation to stop the chronic inflammatory cycle. The injection in combination with other treatment options may help reduce pain. Some people develop an increased flare of pain within a few days of the injection which usually resolves. Patient understands risks and benefits of the injection and is in agreement with an injection today in an effort to slow or reverse the chronic inflammatory process and pain in the foot.   ---Patient signed informed consent and a time-out was performed and there was verification of correct patient, procedure and laterality.  ---Prepped the injection sites with an alcohol swab.  ---Injected a total of 3 mL of 1:1:1 of 4mg Dexamethasone, 40mg Kenalog, and 1mL of 2% Lidocaine plain into the RIGHT sinus tarsi. Patient tolerated the injection well.  ---Injected a total of 3 mL of 1:1:1 of 4mg Dexamethasone, 40mg Kenalog, and 1mL of 2% Lidocaine plain into the RIGHT plantar heel (injected from the medial plantar side of the heel and aimed toward the painful area of the heel). Patient tolerated the injection well.    Peroneal tendon pain:  -Continue compression socks and continue physical therapy until sessions are completed. She feels that PT has helped her make a lot of progress. Continue PT  stretches and exercises at home.    -Patient's overall pain has continued to slowly improve. She did not get the compression ankle sleeve. Discussed this with the orthotist, Anat Alonso. She says that a request for the sleeve was sent to work comp on 03/08/2023, but no reply to Anat's office was given for her to know if the ankle sleeve would be covered by work comp. Patient and her work comp representative will look more into this. The patient may have more pain relief from wearing the ankle sleeve. She is advised to try wearing this while at work and while walking during the day. Remove the sleeve at night.    -Continue orthotics and stability shoe gear at all times when walking, including around the house.      -Physical therapy re-ordered through [facility] in [place] on 03/p07/2023 (previously ordered on 01/09/2023). PT was resumed in March, 2023, and she is continuing to have progress with it.    -Orthotics: The orthotist, Anat Alonso, made a heel cut out and added a mild heel valgus tilt on the RIGHT foot inserts on 02/06/2023. She will continue to wear this with her stability tennis shoes.    Offloading: Patient had been in a CAM boot for 7.5 weeks. She is now in a shoe full time. Will continue to work on strengthening of the foot and ankle and progressing her to more time walking.    ------------------------    -Work restrictions: Patient may slowly start increasing her walking time. She is tolerating 45 minutes of PT, but the extra 15 minutes for the ride home makes her sore for a period of time before she can walk again. Icing helps reduce the pain. Will start with 45 minutes of walking followed by 20 minutes of rest and icing for a total of four hours per shift and for a total of two days per week. After the follow-up appointment, if patient is tolerating this, the goal will be to increase her walking time and decrease her rest time for longer hours and more shifts her week. She is in agreement with this  plan.    -----------------------------------------------------------------  Total time spent preparing to see the patient, review of chart, obtaining history and physical examination, review of x-rays, discussion of treatment options, discussion of CMO adjustments and compression with the orthlsia, education, discussion with patient and documenting in Epic / EMR was 25 minutes. Detailed discussion time was spent with the patient to figure out goals and to her treatment options that may reduce her pain. All time involved was spent on the day of service for the patient (the same day as the patient's appointment).    Patient would like to call as needed for a diabetic foot exam and high risk nail debridement at this time      Return to clinic four weeks to evaluate RIGHT lateral foot pain after starting PT, after orthotic adjustments, after wearing the compression socks and/or compression ankle sleeve, and after starting walking at work      Lexi Felix DPM
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
## 2023-05-18 ENCOUNTER — OFFICE VISIT (OUTPATIENT)
Dept: PODIATRY | Facility: OTHER | Age: 68
End: 2023-05-18
Attending: PODIATRIST
Payer: OTHER MISCELLANEOUS

## 2023-05-18 DIAGNOSIS — M25.571 SINUS TARSI SYNDROME OF RIGHT ANKLE: ICD-10-CM

## 2023-05-18 DIAGNOSIS — M76.71 PERONEAL TENDINITIS OF RIGHT LOWER EXTREMITY: Primary | ICD-10-CM

## 2023-05-18 PROCEDURE — 99213 OFFICE O/P EST LOW 20 MIN: CPT | Performed by: PODIATRIST
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
## 2023-06-21 ENCOUNTER — TELEPHONE (OUTPATIENT)
Dept: SURGERY | Facility: OTHER | Age: 68
End: 2023-06-21

## 2023-07-10 ENCOUNTER — OFFICE VISIT (OUTPATIENT)
Dept: CHIROPRACTIC MEDICINE | Facility: OTHER | Age: 68
End: 2023-07-10
Attending: CHIROPRACTOR
Payer: COMMERCIAL

## 2023-07-10 DIAGNOSIS — M99.03 SEGMENTAL AND SOMATIC DYSFUNCTION OF LUMBAR REGION: Primary | ICD-10-CM

## 2023-07-10 DIAGNOSIS — M99.02 SEGMENTAL AND SOMATIC DYSFUNCTION OF THORACIC REGION: ICD-10-CM

## 2023-07-10 DIAGNOSIS — M54.50 ACUTE RIGHT-SIDED LOW BACK PAIN WITHOUT SCIATICA: ICD-10-CM

## 2023-07-10 PROCEDURE — 98940 CHIROPRACT MANJ 1-2 REGIONS: CPT | Mod: AT | Performed by: CHIROPRACTOR

## 2023-07-11 NOTE — PROGRESS NOTES
Subjective Finding:    Chief compalint: Patient presents with:  Back Pain: Right sided lower back pain and left leg pain.  Started after trouble with her foot    , Pain Scale: 4/10, Intensity: sharp, Duration: 5 months, Radiating: right buttock.    Date of injury:     Activities that the pain restricts:   Home/household/hobbies/social activities: Yes.  Work duties: Yes.  Sleep: No.  Makes symptoms better: rest.  Makes symptoms worse: activity and walking.  Have you seen anyone else for the symptoms? Yes: MD.  Work related: No.  Automobile related injury: No.    Objective and Assessment:    Posture Analysis:   High shoulder: right.  Head tilt: .  High iliac crest: .  Head carriage: neutral.  Thoracic Kyphosis: neutral.  Lumbar Lordosis: forward.    Lumbar Range of Motion: extension decreased.  Cervical Range of Motion: .  Thoracic Range of Motion: .  Extremity Range of Motion: .    Palpation:   Quad lumb: right, referred pain: no    Segmental dysfunction pre-treatment and treatment area: T9, L5 and PSIS Right.    Assessment post-treatment:  Cervical: .  Thoracic: ROM increased.  Lumbar: ROM increased.    Comments: .      Complicating Factors: .    Procedure(s):  CMT:  54505 Chiropractic manipulative treatment 1-2 regions performed   Thoracic: Diversified, See above for level, Prone and Lumbar: Diversified, See above for level, Side posture    Modalities:  None performed this visit    Therapeutic procedures:  None    Plan:  Treatment plan: 2 times per week for 2 weeks.  Instructed patient: stretch as instructed at visit.  Short term goals: increase ROM.  Long term goals: increase ADL.  Prognosis: very good.

## 2023-07-13 ENCOUNTER — OFFICE VISIT (OUTPATIENT)
Dept: CHIROPRACTIC MEDICINE | Facility: OTHER | Age: 68
End: 2023-07-13
Attending: CHIROPRACTOR
Payer: COMMERCIAL

## 2023-07-13 DIAGNOSIS — M99.01 SEGMENTAL AND SOMATIC DYSFUNCTION OF CERVICAL REGION: ICD-10-CM

## 2023-07-13 DIAGNOSIS — M99.03 SEGMENTAL AND SOMATIC DYSFUNCTION OF LUMBAR REGION: Primary | ICD-10-CM

## 2023-07-13 DIAGNOSIS — M99.02 SEGMENTAL AND SOMATIC DYSFUNCTION OF THORACIC REGION: ICD-10-CM

## 2023-07-13 DIAGNOSIS — M54.50 ACUTE BILATERAL LOW BACK PAIN WITHOUT SCIATICA: ICD-10-CM

## 2023-07-13 PROCEDURE — 98941 CHIROPRACT MANJ 3-4 REGIONS: CPT | Mod: AT | Performed by: CHIROPRACTOR

## 2023-07-18 NOTE — PROGRESS NOTES
Subjective Finding:    Chief compalint: Patient presents with:  Back Pain: Back and neck pain are better, less mm spasm in lumbar region    , Pain Scale: 3/10, Intensity: sharp, Duration: 5 months, Radiating: no.    Date of injury:     Activities that the pain restricts:   Home/household/hobbies/social activities: Yes.  Work duties: Yes.  Sleep: No.  Makes symptoms better: rest.  Makes symptoms worse: activity and walking.  Have you seen anyone else for the symptoms? no.  Work related: No.  Automobile related injury: No.    Objective and Assessment:    Posture Analysis:   High shoulder: right.  Head tilt: .  High iliac crest: .  Head carriage: neutral.  Thoracic Kyphosis: neutral.  Lumbar Lordosis: forward.    Lumbar Range of Motion: extension decreased.  Cervical Range of Motion: .  Thoracic Range of Motion: .  Extremity Range of Motion: .    Palpation:   Quad lumb: right, referred pain: no  C spine tightness        Segmental dysfunction pre-treatment and treatment area: T9, L5 and PSIS Right.   C6    Assessment post-treatment:  Cervical: .  Thoracic: ROM increased.  Lumbar: ROM increased.    Comments: .      Complicating Factors: .    Procedure(s):  CMT:  78970 Chiropractic manipulative treatment 3 regions performed   Thoracic: Diversified, See above for level, Prone and Lumbar: Diversified, See above for level, Side posture  Cervical        Modalities:  None performed this visit    Therapeutic procedures:  None    Plan:  Treatment plan: 2 times per week for 2 weeks.  Instructed patient: stretch as instructed at visit.  Short term goals: increase ROM.  Long term goals: increase ADL.  Prognosis: very good.

## 2023-07-20 ENCOUNTER — OFFICE VISIT (OUTPATIENT)
Dept: PODIATRY | Facility: OTHER | Age: 68
End: 2023-07-20
Attending: PODIATRIST
Payer: MEDICARE

## 2023-07-20 VITALS
SYSTOLIC BLOOD PRESSURE: 148 MMHG | DIASTOLIC BLOOD PRESSURE: 84 MMHG | OXYGEN SATURATION: 99 % | TEMPERATURE: 96.8 F | HEART RATE: 77 BPM

## 2023-07-20 DIAGNOSIS — E11.42 DIABETIC POLYNEUROPATHY ASSOCIATED WITH TYPE 2 DIABETES MELLITUS (H): ICD-10-CM

## 2023-07-20 DIAGNOSIS — L60.3 ONYCHODYSTROPHY: Primary | ICD-10-CM

## 2023-07-20 DIAGNOSIS — E11.9 DIABETES MELLITUS TYPE 2, NONINSULIN DEPENDENT (H): ICD-10-CM

## 2023-07-20 PROCEDURE — 11721 DEBRIDE NAIL 6 OR MORE: CPT | Performed by: PODIATRIST

## 2023-07-20 PROCEDURE — G0463 HOSPITAL OUTPT CLINIC VISIT: HCPCS | Mod: 25

## 2023-07-20 ASSESSMENT — PAIN SCALES - GENERAL: PAINLEVEL: SEVERE PAIN (6)
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
## 2023-09-07 ENCOUNTER — TRANSFERRED RECORDS (OUTPATIENT)
Dept: HEALTH INFORMATION MANAGEMENT | Facility: CLINIC | Age: 68
End: 2023-09-07
Payer: COMMERCIAL

## 2023-09-07 LAB — PHQ9 SCORE: 4

## 2023-09-19 PROBLEM — H90.3 SENSORY HEARING LOSS, BILATERAL: Status: ACTIVE | Noted: 2022-01-12

## 2023-09-26 ENCOUNTER — PREP FOR PROCEDURE (OUTPATIENT)
Dept: SURGERY | Facility: OTHER | Age: 68
End: 2023-09-26

## 2023-09-26 ENCOUNTER — OFFICE VISIT (OUTPATIENT)
Dept: SURGERY | Facility: OTHER | Age: 68
End: 2023-09-26
Attending: NURSE PRACTITIONER
Payer: COMMERCIAL

## 2023-09-26 VITALS
HEART RATE: 101 BPM | SYSTOLIC BLOOD PRESSURE: 140 MMHG | TEMPERATURE: 98.2 F | OXYGEN SATURATION: 98 % | DIASTOLIC BLOOD PRESSURE: 60 MMHG | BODY MASS INDEX: 30.1 KG/M2 | WEIGHT: 215 LBS | HEIGHT: 71 IN

## 2023-09-26 DIAGNOSIS — K21.9 GASTROESOPHAGEAL REFLUX DISEASE WITHOUT ESOPHAGITIS: Primary | ICD-10-CM

## 2023-09-26 DIAGNOSIS — K21.9 GERD (GASTROESOPHAGEAL REFLUX DISEASE): ICD-10-CM

## 2023-09-26 DIAGNOSIS — K21.00 GASTROESOPHAGEAL REFLUX DISEASE WITH ESOPHAGITIS: ICD-10-CM

## 2023-09-26 DIAGNOSIS — Z86.0100 HISTORY OF COLONIC POLYPS: ICD-10-CM

## 2023-09-26 DIAGNOSIS — Z86.0100 HISTORY OF COLONIC POLYPS: Primary | ICD-10-CM

## 2023-09-26 PROCEDURE — G0463 HOSPITAL OUTPT CLINIC VISIT: HCPCS

## 2023-09-26 PROCEDURE — 99213 OFFICE O/P EST LOW 20 MIN: CPT | Performed by: NURSE PRACTITIONER

## 2023-09-26 RX ORDER — DIPHENHYDRAMINE HYDROCHLORIDE AND LIDOCAINE HYDROCHLORIDE AND ALUMINUM HYDROXIDE AND MAGNESIUM HYDRO
5-10 KIT EVERY 6 HOURS PRN
COMMUNITY

## 2023-09-26 RX ORDER — BISACODYL 5 MG/1
5 TABLET, DELAYED RELEASE ORAL DAILY PRN
Qty: 4 TABLET | Refills: 0 | Status: SHIPPED | OUTPATIENT
Start: 2023-09-26

## 2023-09-26 RX ORDER — CETIRIZINE HYDROCHLORIDE 10 MG/1
10 TABLET ORAL DAILY
COMMUNITY

## 2023-09-26 ASSESSMENT — PAIN SCALES - GENERAL: PAINLEVEL: MODERATE PAIN (5)

## 2023-09-26 NOTE — PATIENT INSTRUCTIONS
Thank you for allowing Cha Mast CNP and our surgical team to participate in your care. Please call our health unit coordinator at [phone] with scheduling questions or the nurse at [phone] with any other questions or concerns.      You have been scheduled for:  Colonoscopy and EGD with  on 10/26/23.   You will use Golytely bowel prep.  Please see handout for additional instruction.  You will not need a pre-operative appointment with your primary care provider.  You may call [phone] or [phone] with any questions.

## 2023-09-26 NOTE — PROGRESS NOTES
CLINIC NOTE - CONSULT  9/26/2023    Patient : Mary Jean Lesch    Referring Physician :  Pete Flanagan NP    Reason for Referral : Upper and lower endoscopy    This is a 68 year old female with a need for an upper and lower endoscopy.  Upper endoscopy is needed for Gastroesohpageal Reflux.  Lower endoscopy is needed for History of Colon Polyps.      Last EGD : 2021  History of GERD : silent GERD noted    Last colonoscopy : 5 year ago  Family history of colon cancer : NO  Family history of colon polyps : NO  Personal history of colon cancer : NO  Personal history of colon polyps : YES  Rectal bleeding : NO  Changes in bowel habits :NO  Personal history of inflammatory bowel disease : NO    Past Medical History:  Past Medical History:   Diagnosis Date    Presence of both artificial knee joints     6/10/2016       Past Surgical History:  Past Surgical History:   Procedure Laterality Date    APPENDECTOMY      ARTHROPLASTY KNEE BILATERAL      CA ANESTH,SHOULDER REPLACEMENT Left     CHOLECYSTECTOMY      COLONOSCOPY      COLONOSCOPY N/A 3/7/2019    Procedure: DIAGNOSTIC COLONOSCOPY;  Surgeon: Thor Spencer MD;  Location: [facility]    COLONOSCOPY - HIM SCAN  10/07/2013    Colonoscopy with internal hemorrhoidectomy with Dr. Adela Marshall at [facility] [place] - 10 year repeat recommended  10/2013    ESOPHAGOSCOPY, GASTROSCOPY, DUODENOSCOPY (EGD), DILATATION, COMBINED N/A 4/22/2021    Procedure: Upper Endoscopy with BIOPSY;  Surgeon: Juan Alonso MD;  Location: [facility]    HYSTERECTOMY      REPAIR TENDON ACHILLES      x4       Family History History:  History reviewed. No pertinent family history.    History of Tobacco Use:  History   Smoking Status    Never   Smokeless Tobacco    Never       Current Medications:  Current Outpatient Medications   Medication Sig Dispense Refill    albuterol (PROAIR HFA/PROVENTIL HFA/VENTOLIN HFA) 108 (90 Base) MCG/ACT inhaler       atorvastatin (LIPITOR) 80 MG tablet Take 80 mg by mouth daily       Budesonide-Formoterol Fumarate (SYMBICORT IN) Inhale 2 puffs into the lungs daily as needed       cetirizine (ZYRTEC) 10 MG tablet Take 10 mg by mouth daily      cholecalciferol (VITAMIN  -D) 1000 UNITS capsule Take 1 capsule by mouth daily      clonazePAM (KLONOPIN) 0.5 MG tablet Take 0.25 mg by mouth 2 times daily       EPINEPHrine (ANY BX GENERIC EQUIV) 0.3 MG/0.3ML injection 2-pack Inject 0.3 mg into the muscle as needed for anaphylaxis      famotidine (PEPCID) 20 MG tablet Take 20 mg by mouth At Bedtime      gabapentin (NEURONTIN) 600 MG tablet Take 600 mg by mouth 2 times daily      levothyroxine (SYNTHROID/LEVOTHROID) 150 MCG tablet       lidocaine (LIDODERM) 5 % patch Place 2 patches onto the skin every 24 hours To prevent lidocaine toxicity, patient should be patch free for 12 hrs daily.      magic mouthwash suspension, diphenhydrAMINE, lidocaine, aluminum-magnesium & simethicone, (FIRST-MOUTHWASH BLM) compounding kit Swish and swallow 5-10 mLs in mouth every 6 hours as needed for mouth sores      meclizine (ANTIVERT) 25 MG tablet Take 1 tablet by mouth 3 times daily       mirtazapine (REMERON) 30 MG tablet       montelukast (SINGULAIR) 10 MG tablet TAKE ONE TABLET BY MOUTH AT BEDTIME. 90 tablet 3    Multiple Vitamins-Minerals (CEROVITE SENIOR PO) Take 1 tablet by mouth daily      naproxen sodium (ANAPROX) 220 MG tablet Take 220 mg by mouth 2 times daily (with meals)      oxybutynin ER (DITROPAN XL) 15 MG 24 hr tablet Take 15 mg by mouth daily      polyethylene glycol-propylene glycol (SYSTANE ULTRA) 0.4-0.3 % SOLN ophthalmic solution Place 1 drop into both eyes 4 times daily as needed for dry eyes      prednisoLONE (ORAPRED) 15 MG/5 ML solution       tiZANidine (ZANAFLEX) 4 MG capsule Take 4 mg by mouth 3 times daily as needed for muscle spasms      traZODone (DESYREL) 100 MG tablet Take 1 tablet (100 mg) by mouth At Bedtime Take 2 at bedtime (Patient taking differently: Take 100 mg by mouth At  "Bedtime) 90 tablet        Allergies:  Allergies   Allergen Reactions    Strawberry Extract Anaphylaxis    Aspirin     No Clinical Screening - See Comments Hives     Trees, dandelions, pussy willows, and flowers, strawberry       ROS:  Constitutional: negative  Eyes: negative  Ears, nose, mouth, throat, and face: positive for tinnitus  Respiratory: positive for reactive airways  Cardiovascular: negative  Gastrointestinal: positive for reflux symptoms and history of polyps  Genitourinary:negative  Integument/breast: negative  Hematologic/lymphatic: negative  Musculoskeletal: negative  Neurological: positive for headaches  Behavioral/Psych: negative  Endocrine: negative  Allergic/Immunologic: negative    PHYSICAL EXAM:     Vital signs: BP (!) 140/60 (BP Location: Left arm, Cuff Size: Adult Large)   Pulse 101   Temp 98.2  F (36.8  C) (Tympanic)   Ht 1.803 m (5' 11\")   Wt 97.5 kg (215 lb)   SpO2 98%   BMI 29.99 kg/m     BMI: Body mass index is 29.99 kg/m .   General: Normal, healthy, cooperative, in no acute distress, alert   Skin: no rashes   Lungs: clear to auscultation   CV: Regular rate and rhythm    Abdominal: non-distended, soft, non-tender to palpation   Extremities: No cyanosis, clubbing or edema noted bilaterally in Upper and Lower Extremities   Neurological: without deficit    Assessment:   68 year old female with need for upper endoscopy for Gastroesohpageal Reflux and lower endoscopy for History of Colon Polyps:    Plan:   Will schedule an esophagogastroduodenoscopy and colonoscopy.  The procedures with their risks, benefits and alternatives were explained.  Risks include but are not limited to bleeding, perforation, missing lesions, need for additional procedures, reaction to anesthesia.  All the patients questions were answered.  The patient consents to proceed.  The procedures will be scheduled.     "

## 2023-09-26 NOTE — H&P (VIEW-ONLY)
CLINIC NOTE - CONSULT  9/26/2023    Patient : Mary Jean Lesch    Referring Physician :  Pete Flanagan NP    Reason for Referral : Upper and lower endoscopy    This is a 68 year old female with a need for an upper and lower endoscopy.  Upper endoscopy is needed for Gastroesohpageal Reflux.  Lower endoscopy is needed for History of Colon Polyps.      Last EGD : 2021  History of GERD : silent GERD noted    Last colonoscopy : 5 year ago  Family history of colon cancer : NO  Family history of colon polyps : NO  Personal history of colon cancer : NO  Personal history of colon polyps : YES  Rectal bleeding : NO  Changes in bowel habits :NO  Personal history of inflammatory bowel disease : NO    Past Medical History:  Past Medical History:   Diagnosis Date    Presence of both artificial knee joints     6/10/2016       Past Surgical History:  Past Surgical History:   Procedure Laterality Date    APPENDECTOMY      ARTHROPLASTY KNEE BILATERAL      CA ANESTH,SHOULDER REPLACEMENT Left     CHOLECYSTECTOMY      COLONOSCOPY      COLONOSCOPY N/A 3/7/2019    Procedure: DIAGNOSTIC COLONOSCOPY;  Surgeon: Thor Spencer MD;  Location: [facility]    COLONOSCOPY - HIM SCAN  10/07/2013    Colonoscopy with internal hemorrhoidectomy with Dr. Adela Marshall at [facility] - 10 year repeat recommended  10/2013    ESOPHAGOSCOPY, GASTROSCOPY, DUODENOSCOPY (EGD), DILATATION, COMBINED N/A 4/22/2021    Procedure: Upper Endoscopy with BIOPSY;  Surgeon: Juan Alonso MD;  Location: [facility]    HYSTERECTOMY      REPAIR TENDON ACHILLES      x4       Family History History:  History reviewed. No pertinent family history.    History of Tobacco Use:  History   Smoking Status    Never   Smokeless Tobacco    Never       Current Medications:  Current Outpatient Medications   Medication Sig Dispense Refill    albuterol (PROAIR HFA/PROVENTIL HFA/VENTOLIN HFA) 108 (90 Base) MCG/ACT inhaler       atorvastatin (LIPITOR) 80 MG tablet Take 80 mg by mouth daily       Budesonide-Formoterol Fumarate (SYMBICORT IN) Inhale 2 puffs into the lungs daily as needed       cetirizine (ZYRTEC) 10 MG tablet Take 10 mg by mouth daily      cholecalciferol (VITAMIN  -D) 1000 UNITS capsule Take 1 capsule by mouth daily      clonazePAM (KLONOPIN) 0.5 MG tablet Take 0.25 mg by mouth 2 times daily       EPINEPHrine (ANY BX GENERIC EQUIV) 0.3 MG/0.3ML injection 2-pack Inject 0.3 mg into the muscle as needed for anaphylaxis      famotidine (PEPCID) 20 MG tablet Take 20 mg by mouth At Bedtime      gabapentin (NEURONTIN) 600 MG tablet Take 600 mg by mouth 2 times daily      levothyroxine (SYNTHROID/LEVOTHROID) 150 MCG tablet       lidocaine (LIDODERM) 5 % patch Place 2 patches onto the skin every 24 hours To prevent lidocaine toxicity, patient should be patch free for 12 hrs daily.      magic mouthwash suspension, diphenhydrAMINE, lidocaine, aluminum-magnesium & simethicone, (FIRST-MOUTHWASH BLM) compounding kit Swish and swallow 5-10 mLs in mouth every 6 hours as needed for mouth sores      meclizine (ANTIVERT) 25 MG tablet Take 1 tablet by mouth 3 times daily       mirtazapine (REMERON) 30 MG tablet       montelukast (SINGULAIR) 10 MG tablet TAKE ONE TABLET BY MOUTH AT BEDTIME. 90 tablet 3    Multiple Vitamins-Minerals (CEROVITE SENIOR PO) Take 1 tablet by mouth daily      naproxen sodium (ANAPROX) 220 MG tablet Take 220 mg by mouth 2 times daily (with meals)      oxybutynin ER (DITROPAN XL) 15 MG 24 hr tablet Take 15 mg by mouth daily      polyethylene glycol-propylene glycol (SYSTANE ULTRA) 0.4-0.3 % SOLN ophthalmic solution Place 1 drop into both eyes 4 times daily as needed for dry eyes      prednisoLONE (ORAPRED) 15 MG/5 ML solution       tiZANidine (ZANAFLEX) 4 MG capsule Take 4 mg by mouth 3 times daily as needed for muscle spasms      traZODone (DESYREL) 100 MG tablet Take 1 tablet (100 mg) by mouth At Bedtime Take 2 at bedtime (Patient taking differently: Take 100 mg by mouth At  "Bedtime) 90 tablet        Allergies:  Allergies   Allergen Reactions    Strawberry Extract Anaphylaxis    Aspirin     No Clinical Screening - See Comments Hives     Trees, dandelions, pussy willows, and flowers, strawberry       ROS:  Constitutional: negative  Eyes: negative  Ears, nose, mouth, throat, and face: positive for tinnitus  Respiratory: positive for reactive airways  Cardiovascular: negative  Gastrointestinal: positive for reflux symptoms and history of polyps  Genitourinary:negative  Integument/breast: negative  Hematologic/lymphatic: negative  Musculoskeletal: negative  Neurological: positive for headaches  Behavioral/Psych: negative  Endocrine: negative  Allergic/Immunologic: negative    PHYSICAL EXAM:     Vital signs: BP (!) 140/60 (BP Location: Left arm, Cuff Size: Adult Large)   Pulse 101   Temp 98.2  F (36.8  C) (Tympanic)   Ht 1.803 m (5' 11\")   Wt 97.5 kg (215 lb)   SpO2 98%   BMI 29.99 kg/m     BMI: Body mass index is 29.99 kg/m .   General: Normal, healthy, cooperative, in no acute distress, alert   Skin: no rashes   Lungs: clear to auscultation   CV: Regular rate and rhythm    Abdominal: non-distended, soft, non-tender to palpation   Extremities: No cyanosis, clubbing or edema noted bilaterally in Upper and Lower Extremities   Neurological: without deficit    Assessment:   68 year old female with need for upper endoscopy for Gastroesohpageal Reflux and lower endoscopy for History of Colon Polyps:    Plan:   Will schedule an esophagogastroduodenoscopy and colonoscopy.  The procedures with their risks, benefits and alternatives were explained.  Risks include but are not limited to bleeding, perforation, missing lesions, need for additional procedures, reaction to anesthesia.  All the patients questions were answered.  The patient consents to proceed.  The procedures will be scheduled.     "

## 2023-10-23 ENCOUNTER — ANESTHESIA EVENT (OUTPATIENT)
Dept: SURGERY | Facility: HOSPITAL | Age: 68
End: 2023-10-23
Payer: MEDICARE

## 2023-10-23 ASSESSMENT — COPD QUESTIONNAIRES
CAT_SEVERITY: MILD
COPD: 1

## 2023-10-23 NOTE — ANESTHESIA PREPROCEDURE EVALUATION
Anesthesia Pre-Procedure Evaluation    Patient: Mary Jean Lesch   MRN: [id number] : 1955        Procedure : Procedure(s):  Upper endoscopy and colonoscopy          Past Medical History:   Diagnosis Date     Presence of both artificial knee joints     6/10/2016      Past Surgical History:   Procedure Laterality Date     APPENDECTOMY       ARTHROPLASTY KNEE BILATERAL       CA ANESTH,SHOULDER REPLACEMENT Left      CHOLECYSTECTOMY       COLONOSCOPY       COLONOSCOPY N/A 3/7/2019    Procedure: DIAGNOSTIC COLONOSCOPY;  Surgeon: Thor Spencer MD;  Location: [facility]     COLONOSCOPY - HIM SCAN  10/07/2013    [place] [facility] [place]  [place]     ESOPHAGOSCOPY, GASTROSCOPY, DUODENOSCOPY (EGD), DILATATION, COMBINED N/A 2021    Procedure: Upper Endoscopy with BIOPSY;  Surgeon: Juan Alonso MD;  Location: [facility]     HYSTERECTOMY       REPAIR TENDON ACHILLES      x4      Allergies   Allergen Reactions     Strawberry Extract Anaphylaxis     Aspirin      No Clinical Screening - See Comments Hives     Trees, dandelions, pussy willows, and flowers, strawberry      Social History     Tobacco Use     Smoking status: Never     Smokeless tobacco: Never   Substance Use Topics     Alcohol use: Not on file      Wt Readings from Last 1 Encounters:   23 97.5 kg (215 lb)        Anesthesia Evaluation   Pt has had prior anesthetic. Type: MAC and General.    No history of anesthetic complications       ROS/MED HX  ENT/Pulmonary: Comment: Seasonal prn inhaler use    (+) sleep apnea, doesn't use CPAP,                      mild,  COPD,              Neurologic: Comment: TBI  BPPV    (+)    peripheral neuropathy, - bilateral feet. 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    thyroid problem, hypothyroidism,    Obesity,       Psychiatric/Substance Use:     (+) psychiatric history anxiety, depression and bipolar       Infectious Disease: Comment: In nares    (+)   MRSA,         Malignancy:  - neg malignancy ROS     Other:  - neg other ROS    (+)  , H/O Chronic Pain,         Physical Exam    Airway  airway exam normal      Mallampati: III   TM distance: > 3 FB   Neck ROM: full   Mouth opening: > 3 cm    Respiratory Devices and Support         Dental       (+) Minor Abnormalities - some fillings, tiny chips      Cardiovascular   cardiovascular exam normal       Rhythm and rate: regular and normal     Pulmonary   pulmonary exam normal        breath sounds clear to auscultation       OUTSIDE LABS:  CBC:   Lab Results   Component Value Date    WBC 12.8 (H) 08/16/2022    WBC 12.8 (H) 09/09/2019    HGB 16.1 (H) 08/16/2022    HGB 15.7 09/09/2019    HCT 47.3 (H) 08/16/2022    HCT 46.5 09/09/2019     08/16/2022     09/09/2019     BMP:   Lab Results   Component Value Date     08/16/2022     09/09/2019    POTASSIUM 3.9 08/16/2022    POTASSIUM 3.5 09/09/2019    CHLORIDE 106 08/16/2022    CHLORIDE 104 09/09/2019    CO2 26 08/16/2022    CO2 27 09/09/2019    BUN 12 08/16/2022    BUN 23 09/09/2019    CR 0.71 08/16/2022    CR 0.94 09/09/2019     (H)  08/16/2022    GLC 91 09/09/2019     COAGS:   Lab Results   Component Value Date    PTT 30 05/28/2014    INR 0.98 08/16/2022     POC:   Lab Results   Component Value Date    BGM 76 01/18/2019     HEPATIC:   Lab Results   Component Value Date    ALBUMIN 3.8 08/16/2022    PROTTOTAL 8.2 08/16/2022    ALT 14 08/16/2022    AST 17 08/16/2022    ALKPHOS 144 08/16/2022    BILITOTAL 0.8 08/16/2022     OTHER:   Lab Results   Component Value Date    LACT 1.2 01/16/2019    A1C 5.8 (H) 01/16/2019    KENDALL 10.3 (H) 08/16/2022    MAG 1.7 (L) 05/28/2014    LIPASE 108 08/16/2022    TSH 2.27 01/16/2019    CRP 5.0 (H) 05/28/2014       Anesthesia Plan    ASA Status:  3    NPO Status:  NPO Appropriate    Anesthesia Type: MAC.     - Reason for MAC: straight local not clinically adequate              Consents    Anesthesia Plan(s) and associated risks, benefits, and realistic alternatives discussed. Questions answered and patient/representative(s) expressed understanding.     - Discussed: Risks, Benefits and Alternatives for BOTH SEDATION and the PROCEDURE were discussed     - Discussed with:  Patient      - Extended Intubation/Ventilatory Support Discussed: No.      - Patient is DNR/DNI Status: No     Use of blood products discussed: No .     Postoperative Care            Comments:    Other Comments:              GUZMAN Bravo CRNA

## 2023-10-26 ENCOUNTER — HOSPITAL ENCOUNTER (OUTPATIENT)
Facility: HOSPITAL | Age: 68
Discharge: HOME | End: 2023-10-26
Attending: SURGERY | Admitting: SURGERY
Payer: MEDICARE

## 2023-10-26 ENCOUNTER — ANESTHESIA (OUTPATIENT)
Dept: SURGERY | Facility: HOSPITAL | Age: 68
End: 2023-10-26
Payer: MEDICARE

## 2023-10-26 VITALS
RESPIRATION RATE: 18 BRPM | HEIGHT: 71 IN | TEMPERATURE: 97 F | DIASTOLIC BLOOD PRESSURE: 62 MMHG | OXYGEN SATURATION: 98 % | BODY MASS INDEX: 29.65 KG/M2 | HEART RATE: 77 BPM | SYSTOLIC BLOOD PRESSURE: 143 MMHG | WEIGHT: 211.8 LBS

## 2023-10-26 PROCEDURE — 370N000017 HC ANESTHESIA TECHNICAL FEE, PER MIN: Performed by: SURGERY

## 2023-10-26 PROCEDURE — 999N000141 HC STATISTIC PRE-PROCEDURE NURSING ASSESSMENT: Performed by: SURGERY

## 2023-10-26 PROCEDURE — 272N000001 HC OR GENERAL SUPPLY STERILE: Performed by: SURGERY

## 2023-10-26 PROCEDURE — 43239 EGD BIOPSY SINGLE/MULTIPLE: CPT | Performed by: SURGERY

## 2023-10-26 PROCEDURE — 250N000009 HC RX 250: Performed by: NURSE ANESTHETIST, CERTIFIED REGISTERED

## 2023-10-26 PROCEDURE — 45380 COLONOSCOPY AND BIOPSY: CPT | Performed by: NURSE ANESTHETIST, CERTIFIED REGISTERED

## 2023-10-26 PROCEDURE — 360N000075 HC SURGERY LEVEL 2, PER MIN: Performed by: SURGERY

## 2023-10-26 PROCEDURE — 88305 TISSUE EXAM BY PATHOLOGIST: CPT | Mod: 26 | Performed by: PATHOLOGY

## 2023-10-26 PROCEDURE — 88305 TISSUE EXAM BY PATHOLOGIST: CPT | Mod: TC | Performed by: SURGERY

## 2023-10-26 PROCEDURE — 250N000011 HC RX IP 250 OP 636: Performed by: NURSE ANESTHETIST, CERTIFIED REGISTERED

## 2023-10-26 PROCEDURE — 710N000012 HC RECOVERY PHASE 2, PER MINUTE: Performed by: SURGERY

## 2023-10-26 PROCEDURE — 45380 COLONOSCOPY AND BIOPSY: CPT | Mod: PT | Performed by: SURGERY

## 2023-10-26 PROCEDURE — 258N000003 HC RX IP 258 OP 636: Performed by: NURSE ANESTHETIST, CERTIFIED REGISTERED

## 2023-10-26 RX ORDER — ONDANSETRON 4 MG/1
4 TABLET, ORALLY DISINTEGRATING ORAL EVERY 30 MIN PRN
Status: DISCONTINUED | OUTPATIENT
Start: 2023-10-26 | End: 2023-10-26 | Stop reason: HOSPADM

## 2023-10-26 RX ORDER — SODIUM CHLORIDE, SODIUM LACTATE, POTASSIUM CHLORIDE, CALCIUM CHLORIDE 600; 310; 30; 20 MG/100ML; MG/100ML; MG/100ML; MG/100ML
INJECTION, SOLUTION INTRAVENOUS CONTINUOUS
Status: DISCONTINUED | OUTPATIENT
Start: 2023-10-26 | End: 2023-10-26 | Stop reason: HOSPADM

## 2023-10-26 RX ORDER — NALOXONE HYDROCHLORIDE 0.4 MG/ML
0.4 INJECTION, SOLUTION INTRAMUSCULAR; INTRAVENOUS; SUBCUTANEOUS
Status: DISCONTINUED | OUTPATIENT
Start: 2023-10-26 | End: 2023-10-26 | Stop reason: HOSPADM

## 2023-10-26 RX ORDER — FENTANYL CITRATE 50 UG/ML
50 INJECTION, SOLUTION INTRAMUSCULAR; INTRAVENOUS EVERY 5 MIN PRN
Status: DISCONTINUED | OUTPATIENT
Start: 2023-10-26 | End: 2023-10-26 | Stop reason: HOSPADM

## 2023-10-26 RX ORDER — NALOXONE HYDROCHLORIDE 0.4 MG/ML
0.2 INJECTION, SOLUTION INTRAMUSCULAR; INTRAVENOUS; SUBCUTANEOUS
Status: DISCONTINUED | OUTPATIENT
Start: 2023-10-26 | End: 2023-10-26 | Stop reason: HOSPADM

## 2023-10-26 RX ORDER — LIDOCAINE HYDROCHLORIDE 20 MG/ML
INJECTION, SOLUTION INFILTRATION; PERINEURAL PRN
Status: DISCONTINUED | OUTPATIENT
Start: 2023-10-26 | End: 2023-10-26

## 2023-10-26 RX ORDER — PROPOFOL 10 MG/ML
INJECTION, EMULSION INTRAVENOUS PRN
Status: DISCONTINUED | OUTPATIENT
Start: 2023-10-26 | End: 2023-10-26

## 2023-10-26 RX ORDER — ONDANSETRON 2 MG/ML
4 INJECTION INTRAMUSCULAR; INTRAVENOUS EVERY 30 MIN PRN
Status: DISCONTINUED | OUTPATIENT
Start: 2023-10-26 | End: 2023-10-26 | Stop reason: HOSPADM

## 2023-10-26 RX ORDER — LIDOCAINE 40 MG/G
CREAM TOPICAL
Status: DISCONTINUED | OUTPATIENT
Start: 2023-10-26 | End: 2023-10-26 | Stop reason: HOSPADM

## 2023-10-26 RX ORDER — OXYCODONE HYDROCHLORIDE 5 MG/1
5 TABLET ORAL
Status: DISCONTINUED | OUTPATIENT
Start: 2023-10-26 | End: 2023-10-26 | Stop reason: HOSPADM

## 2023-10-26 RX ORDER — PROPOFOL 10 MG/ML
INJECTION, EMULSION INTRAVENOUS CONTINUOUS PRN
Status: DISCONTINUED | OUTPATIENT
Start: 2023-10-26 | End: 2023-10-26

## 2023-10-26 RX ORDER — LABETALOL 20 MG/4 ML (5 MG/ML) INTRAVENOUS SYRINGE
10
Status: DISCONTINUED | OUTPATIENT
Start: 2023-10-26 | End: 2023-10-26 | Stop reason: HOSPADM

## 2023-10-26 RX ORDER — HYDRALAZINE HYDROCHLORIDE 20 MG/ML
2.5-5 INJECTION INTRAMUSCULAR; INTRAVENOUS EVERY 10 MIN PRN
Status: DISCONTINUED | OUTPATIENT
Start: 2023-10-26 | End: 2023-10-26 | Stop reason: HOSPADM

## 2023-10-26 RX ADMIN — PROPOFOL 30 MG: 10 INJECTION, EMULSION INTRAVENOUS at 10:15

## 2023-10-26 RX ADMIN — SODIUM CHLORIDE, POTASSIUM CHLORIDE, SODIUM LACTATE AND CALCIUM CHLORIDE: 600; 310; 30; 20 INJECTION, SOLUTION INTRAVENOUS at 09:25

## 2023-10-26 RX ADMIN — LIDOCAINE HYDROCHLORIDE 100 MG: 20 INJECTION, SOLUTION INFILTRATION; PERINEURAL at 10:11

## 2023-10-26 RX ADMIN — PROPOFOL 40 MG: 10 INJECTION, EMULSION INTRAVENOUS at 10:13

## 2023-10-26 RX ADMIN — PROPOFOL 175 MCG/KG/MIN: 10 INJECTION, EMULSION INTRAVENOUS at 10:32

## 2023-10-26 RX ADMIN — PROPOFOL 250 MCG/KG/MIN: 10 INJECTION, EMULSION INTRAVENOUS at 10:11

## 2023-10-26 ASSESSMENT — ACTIVITIES OF DAILY LIVING (ADL)
ADLS_ACUITY_SCORE: 35
ADLS_ACUITY_SCORE: 37

## 2023-10-26 NOTE — ANESTHESIA CARE TRANSFER NOTE
Patient: Mary Jean Lesch    Procedure: Procedure(s):  Upper endoscopy with biopsies and colonoscopy with polypectomy       Diagnosis: History of colonic polyps [Z86.010]  GERD (gastroesophageal reflux disease) [K21.9]  Diagnosis Additional Information: No value filed.    Anesthesia Type:   MAC     Note:    Oropharynx: spontaneously breathing  Level of Consciousness: awake  Oxygen Supplementation: room air    Independent Airway: airway patency satisfactory and stable  Dentition: dentition unchanged  Vital Signs Stable: post-procedure vital signs reviewed and stable  Report to RN Given: handoff report given  Patient transferred to: Phase II    Handoff Report: Identifed the Patient, Identified the Reponsible Provider, Reviewed the pertinent medical history, Discussed the surgical course, Reviewed Intra-OP anesthesia mangement and issues during anesthesia, Set expectations for post-procedure period and Allowed opportunity for questions and acknowledgement of understanding  Vitals:  Vitals Value Taken Time   BP     Temp     Pulse     Resp     SpO2         Electronically Signed By: GUZMAN Anderson CRNA  October 26, 2023  10:44 AM

## 2023-10-26 NOTE — INTERVAL H&P NOTE
"I have reviewed the surgical (or preoperative) H&P that is linked to this encounter, and examined the patient. There are no significant changes    Clinical Conditions Present on Arrival:  Clinically Significant Risk Factors Present on Admission                  # Overweight: Estimated body mass index is 29.54 kg/m  as calculated from the following:    Height as of this encounter: 1.803 m (5' 11\").    Weight as of this encounter: 96.1 kg (211 lb 12.8 oz).       "

## 2023-10-26 NOTE — OP NOTE
REPORT OF OPERATION  DATE OF PROCEDURE: 10/26/2023    PATIENT: Mary Jean Lesch    SURGERY PERFORMED:    Esophagogastroduodenoscopy with biopsies   Colonoscopy     with biopsy    without use of cauterized snare    without tattooing    PREOPERATIVE DIAGNOSIS:   Gastroesohpageal Reflux   Screening colonoscopy and History of Colon Polyps    POSTOPERATIVE DIAGNOSIS:    Normal Esophagogastroduodenoscopy   Squamous columnar junction at 40   Colon polyps, 20 cm   Diverticulosis was not identified.   Hemorrhoids  were  identified.    SURGEON: Juan Alonso MD    ASSISTANTS: None    ANESTHESIA: Monitored Anesthesia Care    COMPLICATIONS: None apparent    TRANSFUSIONS: None    TISSUE TO PATHOLOGY:    Duodenal, Antral, and Distal Esophageal   Polyps from  20 cm    FINDINGS:   Normal Esophagogastroduodenoscopy   Colon polyps, 20 cm   Diverticulosis was not identified.   Hemorrhoids  were  identified.    INDICATIONS: This is a 68 year old female in need of an Esophagogastroduodenoscopy for Gastroesohpageal Reflux and a colonoscopy for Screening colonoscopy and History of Colon Polyps.  The patient will be taken to the endoscopy suite for those procedures.    DESCRIPTIONS OF PROCEDURE IN DETAIL: After consent was obtained the patient was taken to the operative suite and nancy in the left lateral decubitus position.  The patient was identified and the correct patient was confirmed. Monitored Anesthesia Care was given by anesthesia. A time out was performed verifying the correct patient and the correct procedure.  The entire operative team was in agreement.  All necessary equipment and supplies were in the room.    The endoscope was inserted into the mouth and passed without difficulty to the third portion of the duodenum.  Duodenal biopsies were taken.  The endoscope was then withdrawn through the duodenum, the duodenal bulb and pyloric channel and no abnormalities were noted.  The endoscope was brought back into the stomach and  antral biopsies were obtained.  The endoscope was then retroflexed and no lesions of the fundus body or antrum were seen.  The endoscope was straightened back out and brought into the distal esophagus and a well-defined squamocolumnar junction was identified at 40 cm. Biopsies of the distal esophagus were taken.  The endoscope was slowly withdrawn through the remaining esophagus no other abnormalities are seen,  The endoscope was withdrawn from the patient and the patient was positioned for colonoscopy.    Rectal exam was performed and no lesions of the anal canal were noted.  The colonoscope was inserted into the anus and passed without difficulty to the cecum.  The cecum was identified by the ileocecal valve, the coalescence of the tinea and the appendiceal orifice.  Upon withdrawal all walls of the colon were visualized.  Polyps were identified at  20 cm.  These was not removed by cold biopsy forceps.  Tattooing their of the location was not done.  Large colon masses and colitis were not seen.colon  Diverticulosis was not seen.  Upon reaching the rectum the scope was retroflexed and internal hemorrhoids  were  seen.  The scope was straightened back out and removed from the patient.  The patient was then taken to the recovery room in stable condition tolerating the procedure well.      Prep: fair    Withdrawal time was 6 minutes.    It is recommended that the patient have another colonoscopy in 5 years.

## 2023-10-26 NOTE — ANESTHESIA POSTPROCEDURE EVALUATION
Patient: Mary Jean Lesch    Procedure: Procedure(s):  Upper endoscopy with biopsies and colonoscopy with polypectomy       Anesthesia Type:  MAC    Note:  Disposition: Outpatient   Postop Pain Control: Uneventful            Sign Out: Well controlled pain   PONV: No   Neuro/Psych: Uneventful            Sign Out: Acceptable/Baseline neuro status   Airway/Respiratory: Uneventful            Sign Out: Acceptable/Baseline resp. status   CV/Hemodynamics: Uneventful            Sign Out: Acceptable CV status; No obvious hypovolemia; No obvious fluid overload   Other NRE: NONE   DID A NON-ROUTINE EVENT OCCUR? No         Last vitals:  Vitals Value Taken Time   /62 10/26/23 1100   Temp 97  F (36.1  C) 10/26/23 1043   Pulse 77 10/26/23 1100   Resp 18 10/26/23 1043   SpO2 98 % 10/26/23 1107   Vitals shown include unfiled device data.    Electronically Signed By: GUZMAN De La Garza CRNA  October 26, 2023  11:12 AM

## 2023-10-26 NOTE — OR NURSING
Patient and responsible adult given discharge instructions with no questions regarding instructions. Espinoza score 20/20. Pain level 0/10.  Discharged from unit via ambulation. Patient discharged to home with friend.

## 2023-10-30 LAB
PATH REPORT.COMMENTS IMP SPEC: NORMAL
PATH REPORT.FINAL DX SPEC: NORMAL
PATH REPORT.GROSS SPEC: NORMAL
PATH REPORT.MICROSCOPIC SPEC OTHER STN: NORMAL
PATH REPORT.RELEVANT HX SPEC: NORMAL
PHOTO IMAGE: NORMAL

## 2024-02-06 ENCOUNTER — OFFICE VISIT (OUTPATIENT)
Dept: PODIATRY | Facility: OTHER | Age: 69
End: 2024-02-06
Attending: PODIATRIST
Payer: COMMERCIAL

## 2024-02-06 VITALS
HEART RATE: 79 BPM | SYSTOLIC BLOOD PRESSURE: 144 MMHG | OXYGEN SATURATION: 99 % | TEMPERATURE: 97.2 F | DIASTOLIC BLOOD PRESSURE: 63 MMHG

## 2024-02-06 DIAGNOSIS — E11.9 DIABETES MELLITUS TYPE 2, NONINSULIN DEPENDENT (H): ICD-10-CM

## 2024-02-06 DIAGNOSIS — L60.3 ONYCHODYSTROPHY: Primary | ICD-10-CM

## 2024-02-06 DIAGNOSIS — Z13.89 SCREENING FOR DIABETIC PERIPHERAL NEUROPATHY: ICD-10-CM

## 2024-02-06 DIAGNOSIS — E11.42 DIABETIC POLYNEUROPATHY ASSOCIATED WITH TYPE 2 DIABETES MELLITUS (H): ICD-10-CM

## 2024-02-06 PROCEDURE — G0463 HOSPITAL OUTPT CLINIC VISIT: HCPCS

## 2024-02-06 PROCEDURE — 99207 PR FOOT EXAM NO CHARGE: CPT | Performed by: PODIATRIST

## 2024-02-06 PROCEDURE — 11721 DEBRIDE NAIL 6 OR MORE: CPT | Performed by: PODIATRIST

## 2024-02-06 ASSESSMENT — PAIN SCALES - GENERAL: PAINLEVEL: MODERATE PAIN (4)
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
## 2024-03-12 ENCOUNTER — TRANSFERRED RECORDS (OUTPATIENT)
Dept: HEALTH INFORMATION MANAGEMENT | Facility: HOSPITAL | Age: 69
End: 2024-03-12

## 2024-03-12 LAB
ALBUMIN (URINE) MG/SPEC: 4 MG/L
ALBUMIN/CREATININE RATIO: 2 MG/GM (ref 0–30)
CREATININE (URINE): 177.2 MG/DL

## 2024-05-13 ENCOUNTER — OFFICE VISIT (OUTPATIENT)
Dept: CHIROPRACTIC MEDICINE | Facility: OTHER | Age: 69
End: 2024-05-13
Payer: COMMERCIAL

## 2024-05-13 DIAGNOSIS — M99.02 SEGMENTAL AND SOMATIC DYSFUNCTION OF THORACIC REGION: ICD-10-CM

## 2024-05-13 DIAGNOSIS — M54.50 ACUTE BILATERAL LOW BACK PAIN WITHOUT SCIATICA: ICD-10-CM

## 2024-05-13 DIAGNOSIS — M99.01 SEGMENTAL AND SOMATIC DYSFUNCTION OF CERVICAL REGION: ICD-10-CM

## 2024-05-13 DIAGNOSIS — M99.03 SEGMENTAL AND SOMATIC DYSFUNCTION OF LUMBAR REGION: Primary | ICD-10-CM

## 2024-05-13 PROCEDURE — 98941 CHIROPRACT MANJ 3-4 REGIONS: CPT | Mod: AT | Performed by: CHIROPRACTOR

## 2024-05-13 NOTE — PROGRESS NOTES
Subjective Finding:    Chief compalint: Patient presents with:  Back Pain: With neck pain   , Pain Scale: 3/10, Intensity: dull, Duration: 2 days, Radiating: bilateral buttock.    Date of injury:     Activities that the pain restricts:   Home/household/hobbies/social activities: Yes.  Work duties: Yes.  Sleep: No.  Makes symptoms better: rest.  Makes symptoms worse: lumbar extension and lumbar flexion.  Have you seen anyone else for the symptoms? No.  Work related: No.  Automobile related injury: No.    Objective and Assessment:    Posture Analysis:   High shoulder: right.  Head tilt: .  High iliac crest: .  Head carriage: forward.  Thoracic Kyphosis: neutral.  Lumbar Lordosis: neutral.    Lumbar Range of Motion: flexion decreased and extension decreased.  Cervical Range of Motion: left rotation decreased and right rotation decreased.  Thoracic Range of Motion: .  Extremity Range of Motion: .    Palpation:   Quad lumb: bilateral, referred pain: no  Traps: dull pain, referred pain: no    Segmental dysfunction pre-treatment and treatment area: C6, T1, L4, and L5.    Assessment post-treatment:  Cervical: ROM increased.  Thoracic: ROM increased.  Lumbar: ROM increased.    Comments: .      Complicating Factors: .    Procedure(s):  CMT:  66874 Chiropractic manipulative treatment 3-4 regions performed   Cervical: Diversified, See above for level, Supine, Thoracic: Diversified, See above for level, Prone, and Lumbar: Diversified, See above for level, Side posture    Modalities:  None performed this visit    Therapeutic procedures:  None    Plan:  Treatment plan: PRN.  Instructed patient: stretch as instructed at visit.  Short term goals: reduce muscle spasm.  Long term goals: restore normal function.  Prognosis: very good.

## 2024-06-06 ENCOUNTER — OFFICE VISIT (OUTPATIENT)
Dept: PODIATRY | Facility: OTHER | Age: 69
End: 2024-06-06
Attending: PODIATRIST
Payer: COMMERCIAL

## 2024-06-06 VITALS
DIASTOLIC BLOOD PRESSURE: 72 MMHG | TEMPERATURE: 97 F | OXYGEN SATURATION: 98 % | HEART RATE: 94 BPM | SYSTOLIC BLOOD PRESSURE: 128 MMHG

## 2024-06-06 DIAGNOSIS — L60.3 ONYCHODYSTROPHY: Primary | ICD-10-CM

## 2024-06-06 DIAGNOSIS — E11.9 DIABETES MELLITUS TYPE 2, NONINSULIN DEPENDENT (H): ICD-10-CM

## 2024-06-06 DIAGNOSIS — Z13.89 SCREENING FOR DIABETIC PERIPHERAL NEUROPATHY: ICD-10-CM

## 2024-06-06 DIAGNOSIS — E11.42 DIABETIC POLYNEUROPATHY ASSOCIATED WITH TYPE 2 DIABETES MELLITUS (H): ICD-10-CM

## 2024-06-06 PROCEDURE — 99207 PR FOOT EXAM NO CHARGE: CPT | Performed by: PODIATRIST

## 2024-06-06 PROCEDURE — G0463 HOSPITAL OUTPT CLINIC VISIT: HCPCS

## 2024-06-06 PROCEDURE — 11721 DEBRIDE NAIL 6 OR MORE: CPT | Performed by: PODIATRIST

## 2024-06-06 ASSESSMENT — PAIN SCALES - GENERAL: PAINLEVEL: MODERATE PAIN (4)
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
## 2024-07-15 ENCOUNTER — OFFICE VISIT (OUTPATIENT)
Dept: CHIROPRACTIC MEDICINE | Facility: OTHER | Age: 69
End: 2024-07-15
Attending: CHIROPRACTOR
Payer: COMMERCIAL

## 2024-07-15 DIAGNOSIS — M99.02 SEGMENTAL AND SOMATIC DYSFUNCTION OF THORACIC REGION: ICD-10-CM

## 2024-07-15 DIAGNOSIS — M99.03 SEGMENTAL AND SOMATIC DYSFUNCTION OF LUMBAR REGION: Primary | ICD-10-CM

## 2024-07-15 DIAGNOSIS — M54.50 ACUTE RIGHT-SIDED LOW BACK PAIN WITHOUT SCIATICA: ICD-10-CM

## 2024-07-15 PROCEDURE — 98940 CHIROPRACT MANJ 1-2 REGIONS: CPT | Mod: AT | Performed by: CHIROPRACTOR

## 2024-07-17 ENCOUNTER — OFFICE VISIT (OUTPATIENT)
Dept: CHIROPRACTIC MEDICINE | Facility: OTHER | Age: 69
End: 2024-07-17
Attending: CHIROPRACTOR
Payer: COMMERCIAL

## 2024-07-17 DIAGNOSIS — M99.02 SEGMENTAL AND SOMATIC DYSFUNCTION OF THORACIC REGION: ICD-10-CM

## 2024-07-17 DIAGNOSIS — M54.50 ACUTE BILATERAL LOW BACK PAIN WITHOUT SCIATICA: ICD-10-CM

## 2024-07-17 DIAGNOSIS — M99.03 SEGMENTAL AND SOMATIC DYSFUNCTION OF LUMBAR REGION: Primary | ICD-10-CM

## 2024-07-17 PROCEDURE — 98940 CHIROPRACT MANJ 1-2 REGIONS: CPT | Mod: AT | Performed by: CHIROPRACTOR

## 2024-07-18 NOTE — PROGRESS NOTES
Subjective Finding:    Chief compalint: Patient presents with:  Back Pain , Pain Scale: 6/10, Intensity: sharp, Duration: 2 weeks, Radiating: bilateral buttock.    Date of injury:     Activities that the pain restricts:   Home/household/hobbies/social activities: Yes.  Work duties: No.  Sleep: No.  Makes symptoms better: rest.  Makes symptoms worse: walking.  Have you seen anyone else for the symptoms? No.  Work related: No.  Automobile related injury: No.    Objective and Assessment:    Posture Analysis:   High shoulder: .  Head tilt: .  High iliac crest: left.  Head carriage: neutral.  Thoracic Kyphosis: neutral.  Lumbar Lordosis: forward.    Lumbar Range of Motion: extension decreased and left lateral flexion decreased.  Cervical Range of Motion: .  Thoracic Range of Motion: .  Extremity Range of Motion: .    Palpation:   Quad lumb: left, referred pain: no    Segmental dysfunction pre-treatment and treatment area: T4, L4, and L5.    Assessment post-treatment:  Cervical: .  Thoracic: ROM increased.  Lumbar: ROM increased.    Comments: .      Complicating Factors: .    Procedure(s):  CMT:  35600 Chiropractic manipulative treatment 1-2 regions performed   Thoracic: Diversified, See above for level, Prone and Lumbar: Diversified, See above for level, Side posture    Modalities:  None performed this visit    Therapeutic procedures:  None    Plan:  Treatment plan: PRN.  Instructed patient: stretch as instructed at visit.  Short term goals: reduce pain.  Long term goals: increase ADL.  Prognosis: very good.

## 2024-07-22 ENCOUNTER — OFFICE VISIT (OUTPATIENT)
Dept: CHIROPRACTIC MEDICINE | Facility: OTHER | Age: 69
End: 2024-07-22
Attending: CHIROPRACTOR
Payer: COMMERCIAL

## 2024-07-22 DIAGNOSIS — M54.50 ACUTE BILATERAL LOW BACK PAIN WITHOUT SCIATICA: ICD-10-CM

## 2024-07-22 DIAGNOSIS — M99.03 SEGMENTAL AND SOMATIC DYSFUNCTION OF LUMBAR REGION: Primary | ICD-10-CM

## 2024-07-22 DIAGNOSIS — M99.02 SEGMENTAL AND SOMATIC DYSFUNCTION OF THORACIC REGION: ICD-10-CM

## 2024-07-22 PROCEDURE — 98940 CHIROPRACT MANJ 1-2 REGIONS: CPT | Mod: AT | Performed by: CHIROPRACTOR

## 2024-07-22 NOTE — PROGRESS NOTES
Subjective Finding:    Chief compalint: Patient presents with:  Back Pain: Tightness in lower back , Pain Scale: 4/10, Intensity: sharp, Duration: 2 weeks, Radiating: bilateral buttock.    Date of injury:     Activities that the pain restricts:   Home/household/hobbies/social activities: Yes.  Work duties: Yes.  Sleep: No.  Makes symptoms better: rest.  Makes symptoms worse: lumbar flexion.  Have you seen anyone else for the symptoms? No.  Work related: No.  Automobile related injury: No.    Objective and Assessment:    Posture Analysis:   High shoulder: .  Head tilt: .  High iliac crest: .  Head carriage: neutral.  Thoracic Kyphosis: neutral.  Lumbar Lordosis: forward.    Lumbar Range of Motion: extension decreased.  Cervical Range of Motion: .  Thoracic Range of Motion: .  Extremity Range of Motion: .    Palpation:   Quad lumb: bilateral, referred pain: no    Segmental dysfunction pre-treatment and treatment area: T7, L4, and L5.    Assessment post-treatment:  Cervical: .  Thoracic: ROM increased.  Lumbar: ROM increased.    Comments: .      Complicating Factors: .    Procedure(s):  CMT:    Cervical: Diversified, See above for level, Supine, Thoracic: Diversified, See above for level, Prone, and Lumbar: Diversified, See above for level, Side posture    Modalities:  None performed this visit    Therapeutic procedures:  None    Plan:  Treatment plan: PRN.  Instructed patient: stretch as instructed at visit.  Short term goals: increase ROM.  Long term goals: increase ADL.  Prognosis: very good.

## 2024-07-25 NOTE — PROGRESS NOTES
Subjective Finding:    Chief compalint: Patient presents with:  Back Pain , Pain Scale: 2/10, Intensity: dull, Duration: 2 weeks, Radiating: no.    Date of injury:     Activities that the pain restricts:   Home/household/hobbies/social activities: Yes.  Work duties: No.  Sleep: No.  Makes symptoms better: rest.  Makes symptoms worse: walking.  Have you seen anyone else for the symptoms? No.  Work related: No.  Automobile related injury: No.    Objective and Assessment:    Posture Analysis:   High shoulder: .  Head tilt: .  High iliac crest: .  Head carriage: neutral.  Thoracic Kyphosis: neutral.  Lumbar Lordosis: forward.    Lumbar Range of Motion: extension decreased.  Cervical Range of Motion: .  Thoracic Range of Motion: .  Extremity Range of Motion: .    Palpation:   Quad lumb: bilateral, referred pain: yes    Segmental dysfunction pre-treatment and treatment area: T5, L4, and L5.    Assessment post-treatment:  Cervical: ROM increased.  Thoracic: ROM increased.  Lumbar: ROM increased.    Comments: .      Complicating Factors: .    Procedure(s):  CMT:  04037 Chiropractic manipulative treatment 1-2 regions performed   Thoracic: Diversified, See above for level, Prone and Lumbar: Diversified, See above for level, Side posture    Modalities:  None performed this visit    Therapeutic procedures:  None    Plan:  Treatment plan: PRN.  Instructed patient: stretch as instructed at visit.  Short term goals: reduce pain.  Long term goals: increase ADL.  Prognosis: very good.

## 2024-07-30 ENCOUNTER — MEDICAL CORRESPONDENCE (OUTPATIENT)
Dept: HEALTH INFORMATION MANAGEMENT | Facility: HOSPITAL | Age: 69
End: 2024-07-30

## 2024-08-13 ENCOUNTER — OFFICE VISIT (OUTPATIENT)
Dept: PODIATRY | Facility: OTHER | Age: 69
End: 2024-08-13
Attending: PODIATRIST
Payer: COMMERCIAL

## 2024-08-13 ENCOUNTER — ANCILLARY PROCEDURE (OUTPATIENT)
Dept: GENERAL RADIOLOGY | Facility: OTHER | Age: 69
End: 2024-08-13
Attending: PODIATRIST
Payer: COMMERCIAL

## 2024-08-13 VITALS
TEMPERATURE: 98.2 F | HEART RATE: 79 BPM | OXYGEN SATURATION: 96 % | SYSTOLIC BLOOD PRESSURE: 127 MMHG | DIASTOLIC BLOOD PRESSURE: 78 MMHG

## 2024-08-13 DIAGNOSIS — M79.674 PAIN IN RIGHT TOE(S): ICD-10-CM

## 2024-08-13 DIAGNOSIS — E11.9 DIABETES MELLITUS TYPE 2, NONINSULIN DEPENDENT (H): ICD-10-CM

## 2024-08-13 DIAGNOSIS — L60.3 ONYCHODYSTROPHY: ICD-10-CM

## 2024-08-13 DIAGNOSIS — S92.514A CLOSED NONDISPLACED FRACTURE OF PROXIMAL PHALANX OF LESSER TOE OF RIGHT FOOT, INITIAL ENCOUNTER: Primary | ICD-10-CM

## 2024-08-13 DIAGNOSIS — E11.42 DIABETIC POLYNEUROPATHY ASSOCIATED WITH TYPE 2 DIABETES MELLITUS (H): ICD-10-CM

## 2024-08-13 DIAGNOSIS — Z13.89 SCREENING FOR DIABETIC PERIPHERAL NEUROPATHY: ICD-10-CM

## 2024-08-13 PROCEDURE — 99214 OFFICE O/P EST MOD 30 MIN: CPT | Mod: 25 | Performed by: PODIATRIST

## 2024-08-13 PROCEDURE — 11721 DEBRIDE NAIL 6 OR MORE: CPT | Performed by: PODIATRIST

## 2024-08-13 PROCEDURE — G0463 HOSPITAL OUTPT CLINIC VISIT: HCPCS

## 2024-08-13 PROCEDURE — 73630 X-RAY EXAM OF FOOT: CPT | Mod: TC,RT

## 2024-08-13 PROCEDURE — G0463 HOSPITAL OUTPT CLINIC VISIT: HCPCS | Mod: 25

## 2024-08-13 ASSESSMENT — PAIN SCALES - GENERAL: PAINLEVEL: SEVERE PAIN (6)
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
## 2024-08-14 ENCOUNTER — MEDICAL CORRESPONDENCE (OUTPATIENT)
Dept: HEALTH INFORMATION MANAGEMENT | Facility: HOSPITAL | Age: 69
End: 2024-08-14

## 2024-08-19 ENCOUNTER — HOSPITAL ENCOUNTER (EMERGENCY)
Facility: HOSPITAL | Age: 69
Discharge: HOME | End: 2024-08-19
Attending: NURSE PRACTITIONER | Admitting: NURSE PRACTITIONER
Payer: COMMERCIAL

## 2024-08-19 VITALS
RESPIRATION RATE: 18 BRPM | OXYGEN SATURATION: 97 % | SYSTOLIC BLOOD PRESSURE: 117 MMHG | HEART RATE: 78 BPM | TEMPERATURE: 97.6 F | DIASTOLIC BLOOD PRESSURE: 76 MMHG
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
## 2024-08-19 PROCEDURE — 99213 OFFICE O/P EST LOW 20 MIN: CPT | Performed by: NURSE PRACTITIONER

## 2024-08-19 PROCEDURE — G0463 HOSPITAL OUTPT CLINIC VISIT: HCPCS

## 2024-08-19 ASSESSMENT — ACTIVITIES OF DAILY LIVING (ADL): ADLS_ACUITY_SCORE: 40

## 2024-08-19 ASSESSMENT — ENCOUNTER SYMPTOMS: WOUND: 1

## 2024-08-19 NOTE — ED TRIAGE NOTES
Patient presents with concerns of animal bite to left index finger. Pt reports being bit by a squirrel earlier today
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
## 2024-08-19 NOTE — DISCHARGE INSTRUCTIONS
Continue cleaning your wound with mild soap and water.  Apply over-the-counter triple antibiotic ointment over it.    Observe for any signs of infection such as redness or abnormal discharge and return here for evaluation.    Follow-up with your primary doctor as needed.

## 2024-08-20 ENCOUNTER — TELEPHONE (OUTPATIENT)
Dept: WOUND CARE | Facility: OTHER | Age: 69
End: 2024-08-20

## 2024-08-20 NOTE — TELEPHONE ENCOUNTER
----- Message from Lexi Felix sent at 8/13/2024 10:01 PM CDT -----  Please call patient with x-ray results:    Her previous fusion sites on the third and fourth toes have both fractured. She is advised to minimally wear the post-op shoe when walking and wear the CAM boot she has at home if tolerated to get the pressure off the toe. She should stop wearing her diabetic shoes and inserts until they can be evaluated by Anat (hopefully at her follow-up appointment), so she should bring her diabetic shoes with her to her follow-up appointment. Her PCP will also be notified. Her bone density is also in question. Her last bone density test was in 2022, so will discuss this with her PCP to see if an updated one is needed. She is advised to keep her follow-up appointment in three weeks and call sooner if she notices any open wounds or signs of infection (redness, swelling, pain, purulence, fever, chills, nausea, vomiting).     Thank you

## 2024-08-20 NOTE — TELEPHONE ENCOUNTER
RN CC spoke with pt and gave pt x-ray results and MD recommendations. Pt reports that she does not have a CAM boot. She would need one ordered.     Thank you.     Marian Marquez RN on 8/20/2024 at 2:17 PM

## 2024-08-21 ENCOUNTER — HOSPITAL ENCOUNTER (OUTPATIENT)
Dept: BONE DENSITY | Facility: HOSPITAL | Age: 69
Discharge: HOME | End: 2024-08-21
Attending: NURSE PRACTITIONER | Admitting: NURSE PRACTITIONER
Payer: COMMERCIAL

## 2024-08-21 DIAGNOSIS — N95.9 UNSPECIFIED MENOPAUSAL AND PERIMENOPAUSAL DISORDER: ICD-10-CM

## 2024-08-21 PROCEDURE — 77080 DXA BONE DENSITY AXIAL: CPT

## 2024-08-22 DIAGNOSIS — S92.514A CLOSED NONDISPLACED FRACTURE OF PROXIMAL PHALANX OF LESSER TOE OF RIGHT FOOT, INITIAL ENCOUNTER: Primary | ICD-10-CM

## 2024-08-22 NOTE — TELEPHONE ENCOUNTER
I spoke with her. She said her balance is not the best and uses a cane. The short boot might be better for her.

## 2024-08-23 NOTE — PROGRESS NOTES
Short leg CAM boot ordered for patient to help offload her toes after she confirmed with the nurse coordinator that she would like the short boot over the long boot. She is not to wear the short leg boot when driving. She will be called with this update.

## 2024-09-03 ENCOUNTER — OFFICE VISIT (OUTPATIENT)
Dept: PODIATRY | Facility: OTHER | Age: 69
End: 2024-09-03
Attending: PODIATRIST
Payer: COMMERCIAL

## 2024-09-03 VITALS
DIASTOLIC BLOOD PRESSURE: 79 MMHG | SYSTOLIC BLOOD PRESSURE: 147 MMHG | HEART RATE: 98 BPM | OXYGEN SATURATION: 96 % | TEMPERATURE: 99.2 F

## 2024-09-03 DIAGNOSIS — E11.9 DIABETES MELLITUS TYPE 2, NONINSULIN DEPENDENT (H): ICD-10-CM

## 2024-09-03 DIAGNOSIS — S92.514A CLOSED NONDISPLACED FRACTURE OF PROXIMAL PHALANX OF LESSER TOE OF RIGHT FOOT, INITIAL ENCOUNTER: Primary | ICD-10-CM

## 2024-09-03 DIAGNOSIS — E11.42 DIABETIC POLYNEUROPATHY ASSOCIATED WITH TYPE 2 DIABETES MELLITUS (H): ICD-10-CM

## 2024-09-03 DIAGNOSIS — Z13.89 SCREENING FOR DIABETIC PERIPHERAL NEUROPATHY: ICD-10-CM

## 2024-09-03 PROCEDURE — 99213 OFFICE O/P EST LOW 20 MIN: CPT | Performed by: PODIATRIST

## 2024-09-03 PROCEDURE — G0463 HOSPITAL OUTPT CLINIC VISIT: HCPCS

## 2024-09-03 ASSESSMENT — PAIN SCALES - GENERAL: PAINLEVEL: MODERATE PAIN (5)
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
## 2024-10-15 ENCOUNTER — OFFICE VISIT (OUTPATIENT)
Dept: PODIATRY | Facility: OTHER | Age: 69
End: 2024-10-15
Attending: PODIATRIST
Payer: COMMERCIAL

## 2024-10-15 ENCOUNTER — ANCILLARY PROCEDURE (OUTPATIENT)
Dept: GENERAL RADIOLOGY | Facility: OTHER | Age: 69
End: 2024-10-15
Attending: PODIATRIST
Payer: COMMERCIAL

## 2024-10-15 VITALS
TEMPERATURE: 97.9 F | DIASTOLIC BLOOD PRESSURE: 79 MMHG | HEART RATE: 70 BPM | OXYGEN SATURATION: 97 % | SYSTOLIC BLOOD PRESSURE: 141 MMHG

## 2024-10-15 DIAGNOSIS — E11.42 DIABETIC POLYNEUROPATHY ASSOCIATED WITH TYPE 2 DIABETES MELLITUS (H): ICD-10-CM

## 2024-10-15 DIAGNOSIS — Z13.89 SCREENING FOR DIABETIC PERIPHERAL NEUROPATHY: ICD-10-CM

## 2024-10-15 DIAGNOSIS — E11.9 DIABETES MELLITUS TYPE 2, NONINSULIN DEPENDENT (H): ICD-10-CM

## 2024-10-15 DIAGNOSIS — S92.514A CLOSED NONDISPLACED FRACTURE OF PROXIMAL PHALANX OF LESSER TOE OF RIGHT FOOT, INITIAL ENCOUNTER: ICD-10-CM

## 2024-10-15 DIAGNOSIS — S92.514A CLOSED NONDISPLACED FRACTURE OF PROXIMAL PHALANX OF LESSER TOE OF RIGHT FOOT, INITIAL ENCOUNTER: Primary | ICD-10-CM

## 2024-10-15 PROCEDURE — 99212 OFFICE O/P EST SF 10 MIN: CPT | Performed by: PODIATRIST

## 2024-10-15 PROCEDURE — G0463 HOSPITAL OUTPT CLINIC VISIT: HCPCS

## 2024-10-15 PROCEDURE — 73630 X-RAY EXAM OF FOOT: CPT | Mod: TC,RT

## 2024-10-15 ASSESSMENT — PAIN SCALES - GENERAL: PAINLEVEL: MILD PAIN (2)
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
## 2024-10-28 NOTE — PLAN OF CARE
Vitals: VSS. On RA. Afebrile     Pain: Denied    Orientation: A&O. Neuro checks intact.     Cardiac: Reg    Lungs: Clear    ABD: Bowels Active.    Urinating: Did not void this shift. Charting shows that pt voided x2 in ED before being admitted.     Skin: WNL    IV fluids: SL    Antibiotics: None    Mobility: Did not get out of bed this shift. Reported she is a stand by.     Safety: Bed alarm on. Call light in reach. Rounding done.    Comment: Slept well throughout night.     Face to face report given with opportunity to observe patient.    Report given to Tonie Rondon   1/17/2019  7:59 AM              Name band;

## 2024-10-29 ENCOUNTER — TRANSFERRED RECORDS (OUTPATIENT)
Dept: HEALTH INFORMATION MANAGEMENT | Facility: HOSPITAL | Age: 69
End: 2024-10-29

## 2024-10-29 LAB — TSH SERPL-ACNC: 0.49 UIU/ML (ref 0.35–4.8)

## 2024-11-11 ENCOUNTER — TRANSFERRED RECORDS (OUTPATIENT)
Dept: MULTI SPECIALTY CLINIC | Facility: CLINIC | Age: 69
End: 2024-11-11

## 2024-11-11 LAB — RETINOPATHY: NORMAL

## 2024-12-17 ENCOUNTER — OFFICE VISIT (OUTPATIENT)
Dept: PODIATRY | Facility: OTHER | Age: 69
End: 2024-12-17
Attending: PODIATRIST
Payer: COMMERCIAL

## 2024-12-17 VITALS
DIASTOLIC BLOOD PRESSURE: 80 MMHG | HEART RATE: 85 BPM | OXYGEN SATURATION: 98 % | SYSTOLIC BLOOD PRESSURE: 148 MMHG | TEMPERATURE: 97.2 F

## 2024-12-17 DIAGNOSIS — E11.42 DIABETIC POLYNEUROPATHY ASSOCIATED WITH TYPE 2 DIABETES MELLITUS (H): ICD-10-CM

## 2024-12-17 DIAGNOSIS — L60.3 ONYCHODYSTROPHY: ICD-10-CM

## 2024-12-17 DIAGNOSIS — M77.41 METATARSALGIA OF RIGHT FOOT: Primary | ICD-10-CM

## 2024-12-17 DIAGNOSIS — E11.9 DIABETES MELLITUS TYPE 2, NONINSULIN DEPENDENT (H): ICD-10-CM

## 2024-12-17 DIAGNOSIS — Z13.89 SCREENING FOR DIABETIC PERIPHERAL NEUROPATHY: ICD-10-CM

## 2024-12-17 PROCEDURE — 11721 DEBRIDE NAIL 6 OR MORE: CPT | Performed by: PODIATRIST

## 2024-12-17 PROCEDURE — G0463 HOSPITAL OUTPT CLINIC VISIT: HCPCS

## 2024-12-17 ASSESSMENT — PAIN SCALES - GENERAL: PAINLEVEL_OUTOF10: MILD PAIN (3)
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
## 2024-12-31 ENCOUNTER — TRANSFERRED RECORDS (OUTPATIENT)
Dept: HEALTH INFORMATION MANAGEMENT | Facility: HOSPITAL | Age: 69
End: 2024-12-31

## 2024-12-31 LAB
CHOLESTEROL (EXTERNAL): 159 MG/DL
HBA1C MFR BLD: 5.5 %
HDLC SERPL-MCNC: 55 MG/DL
LDL CHOLESTEROL CALCULATED (EXTERNAL): 77 MG/DL
TRIGLYCERIDES (EXTERNAL): 133 MG/DL

## 2025-01-29 ENCOUNTER — OFFICE VISIT (OUTPATIENT)
Dept: CHIROPRACTIC MEDICINE | Facility: OTHER | Age: 70
End: 2025-01-29
Attending: CHIROPRACTOR
Payer: COMMERCIAL

## 2025-01-29 DIAGNOSIS — M99.03 SEGMENTAL AND SOMATIC DYSFUNCTION OF LUMBAR REGION: Primary | ICD-10-CM

## 2025-01-29 DIAGNOSIS — M54.50 ACUTE BILATERAL LOW BACK PAIN WITHOUT SCIATICA: ICD-10-CM

## 2025-01-29 DIAGNOSIS — M99.01 SEGMENTAL AND SOMATIC DYSFUNCTION OF CERVICAL REGION: ICD-10-CM

## 2025-01-29 DIAGNOSIS — M99.02 SEGMENTAL AND SOMATIC DYSFUNCTION OF THORACIC REGION: ICD-10-CM

## 2025-01-30 NOTE — PROGRESS NOTES
Subjective Finding:    Chief compalint: Patient presents with:  Back Pain , Pain Scale: 4/10, Intensity: dull, Duration: 1 months, Radiating: bilateral buttock.    Date of injury:     Activities that the pain restricts:   Home/household/hobbies/social activities: Yes.  Work duties: Yes.  Sleep: Yes.  Makes symptoms better: rest.  Makes symptoms worse: activity and walking.  Have you seen anyone else for the symptoms? No.  Work related: No.  Automobile related injury: No.    Objective and Assessment:    Posture Analysis:   High shoulder: .  Head tilt: .  High iliac crest: .  Head carriage: forward.  Thoracic Kyphosis: neutral.  Lumbar Lordosis: forward.    Lumbar Range of Motion: flexion decreased and extension decreased.  Cervical Range of Motion: .  Thoracic Range of Motion: .  Extremity Range of Motion: .    Palpation:   Quad lumb: bilateral, referred pain: no    Segmental dysfunction pre-treatment and treatment area: C2, C3, T5, T10, and L5.    Assessment post-treatment:  Cervical: ROM increased.  Thoracic: ROM increased.  Lumbar: ROM increased.    Comments: .      Complicating Factors: .    Procedure(s):  CMT:  60113 Chiropractic manipulative treatment 3-4 regions performed   Cervical: Diversified, See above for level, Supine, Thoracic: Diversified, See above for level, Prone, and Lumbar: Diversified, See above for level, Side posture    Modalities:  None performed this visit    Therapeutic procedures:  None    Plan:  Treatment plan: PRN.  Instructed patient: stretch as instructed at visit.  Short term goals: increase ROM.  Long term goals: increase ADL.  Prognosis: excellent.

## 2025-02-03 ENCOUNTER — OFFICE VISIT (OUTPATIENT)
Dept: CHIROPRACTIC MEDICINE | Facility: OTHER | Age: 70
End: 2025-02-03
Attending: CHIROPRACTOR
Payer: COMMERCIAL

## 2025-02-03 DIAGNOSIS — M54.50 ACUTE BILATERAL LOW BACK PAIN WITHOUT SCIATICA: ICD-10-CM

## 2025-02-03 DIAGNOSIS — M99.01 SEGMENTAL AND SOMATIC DYSFUNCTION OF CERVICAL REGION: ICD-10-CM

## 2025-02-03 DIAGNOSIS — M99.02 SEGMENTAL AND SOMATIC DYSFUNCTION OF THORACIC REGION: ICD-10-CM

## 2025-02-03 DIAGNOSIS — M99.03 SEGMENTAL AND SOMATIC DYSFUNCTION OF LUMBAR REGION: Primary | ICD-10-CM

## 2025-02-03 PROCEDURE — 98941 CHIROPRACT MANJ 3-4 REGIONS: CPT | Mod: AT | Performed by: CHIROPRACTOR

## 2025-02-03 NOTE — PROGRESS NOTES
Subjective Finding:    Chief compalint: Patient presents with:  Back Pain: Better with tx last week , Pain Scale: 4/10, Intensity: dull, Duration: 2 weeks, Radiating:  no.    Date of injury:     Activities that the pain restricts:   Home/household/hobbies/social activities: Yes.  Work duties: No.  Sleep: No.  Makes symptoms better: rest.  Makes symptoms worse: lumbar flexion.  Have you seen anyone else for the symptoms? No.  Work related: No.  Automobile related injury: No.    Objective and Assessment:    Posture Analysis:   High shoulder: .  Head tilt: .  High iliac crest: .  Head carriage: neutral.  Thoracic Kyphosis: neutral.  Lumbar Lordosis: forward.    Lumbar Range of Motion: flexion decreased and extension decreased.  Cervical Range of Motion: .  Thoracic Range of Motion: .  Extremity Range of Motion: .    Palpation:   Quad lumb: bilateral, referred pain: no    Segmental dysfunction pre-treatment and treatment area: C7, T1, T5, and L5.    Assessment post-treatment:  Cervical: ROM increased.  Thoracic: ROM increased.  Lumbar: ROM increased.    Comments: .      Complicating Factors: .    Procedure(s):  CMT:  20772 Chiropractic manipulative treatment 3-4 regions performed   Cervical: Diversified, See above for level, Supine, Thoracic: Diversified, See above for level, Prone, and Lumbar: Diversified, See above for level, Side posture    Modalities:  None performed this visit    Therapeutic procedures:  None    Plan:  Treatment plan: PRN.  Instructed patient: stretch as instructed at visit.  Short term goals: reduce pain.  Long term goals: increase ADL.  Prognosis: very good.

## 2025-02-18 ENCOUNTER — OFFICE VISIT (OUTPATIENT)
Dept: PODIATRY | Facility: OTHER | Age: 70
End: 2025-02-18
Attending: PODIATRIST
Payer: COMMERCIAL

## 2025-02-18 VITALS
OXYGEN SATURATION: 99 % | DIASTOLIC BLOOD PRESSURE: 75 MMHG | HEART RATE: 62 BPM | RESPIRATION RATE: 18 BRPM | TEMPERATURE: 97.4 F | SYSTOLIC BLOOD PRESSURE: 148 MMHG

## 2025-02-18 DIAGNOSIS — M72.2 PLANTAR FASCIITIS OF RIGHT FOOT: ICD-10-CM

## 2025-02-18 DIAGNOSIS — E11.42 DIABETIC POLYNEUROPATHY ASSOCIATED WITH TYPE 2 DIABETES MELLITUS (H): ICD-10-CM

## 2025-02-18 DIAGNOSIS — Z13.89 SCREENING FOR DIABETIC PERIPHERAL NEUROPATHY: ICD-10-CM

## 2025-02-18 DIAGNOSIS — E11.9 DIABETES MELLITUS TYPE 2, NONINSULIN DEPENDENT (H): ICD-10-CM

## 2025-02-18 DIAGNOSIS — L60.3 ONYCHODYSTROPHY: Primary | ICD-10-CM

## 2025-02-18 PROCEDURE — G0463 HOSPITAL OUTPT CLINIC VISIT: HCPCS | Mod: 25

## 2025-02-18 PROCEDURE — 250N000011 HC RX IP 250 OP 636: Mod: JZ | Performed by: PODIATRIST

## 2025-02-18 PROCEDURE — 11721 DEBRIDE NAIL 6 OR MORE: CPT | Performed by: PODIATRIST

## 2025-02-18 PROCEDURE — 20550 NJX 1 TENDON SHEATH/LIGAMENT: CPT | Performed by: PODIATRIST

## 2025-02-18 RX ORDER — DEXAMETHASONE SODIUM PHOSPHATE 4 MG/ML
4 INJECTION, SOLUTION INTRA-ARTICULAR; INTRALESIONAL; INTRAMUSCULAR; INTRAVENOUS; SOFT TISSUE ONCE
Status: COMPLETED | OUTPATIENT
Start: 2025-02-18 | End: 2025-02-18

## 2025-02-18 RX ORDER — TRIAMCINOLONE ACETONIDE 40 MG/ML
40 INJECTION, SUSPENSION INTRA-ARTICULAR; INTRAMUSCULAR ONCE
Status: COMPLETED | OUTPATIENT
Start: 2025-02-18 | End: 2025-02-18

## 2025-02-18 RX ADMIN — DEXAMETHASONE SODIUM PHOSPHATE 4 MG: 4 INJECTION, SOLUTION INTRA-ARTICULAR; INTRALESIONAL; INTRAMUSCULAR; INTRAVENOUS; SOFT TISSUE at 09:54

## 2025-02-18 RX ADMIN — TRIAMCINOLONE ACETONIDE 40 MG: 40 INJECTION, SUSPENSION INTRA-ARTICULAR; INTRAMUSCULAR at 09:53

## 2025-02-18 ASSESSMENT — PAIN SCALES - GENERAL: PAINLEVEL_OUTOF10: MILD PAIN (3)
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
## 2025-04-28 ENCOUNTER — OFFICE VISIT (OUTPATIENT)
Dept: CHIROPRACTIC MEDICINE | Facility: OTHER | Age: 70
End: 2025-04-28
Attending: CHIROPRACTOR
Payer: COMMERCIAL

## 2025-04-28 DIAGNOSIS — M99.02 SEGMENTAL AND SOMATIC DYSFUNCTION OF THORACIC REGION: ICD-10-CM

## 2025-04-28 DIAGNOSIS — M99.03 SEGMENTAL AND SOMATIC DYSFUNCTION OF LUMBAR REGION: Primary | ICD-10-CM

## 2025-04-28 DIAGNOSIS — M54.50 ACUTE BILATERAL LOW BACK PAIN WITHOUT SCIATICA: ICD-10-CM

## 2025-04-28 DIAGNOSIS — M99.01 SEGMENTAL AND SOMATIC DYSFUNCTION OF CERVICAL REGION: ICD-10-CM

## 2025-04-28 PROCEDURE — 98941 CHIROPRACT MANJ 3-4 REGIONS: CPT | Mod: AT | Performed by: CHIROPRACTOR

## 2025-04-29 ENCOUNTER — OFFICE VISIT (OUTPATIENT)
Dept: PODIATRY | Facility: OTHER | Age: 70
End: 2025-04-29
Attending: PODIATRIST
Payer: COMMERCIAL

## 2025-04-29 VITALS
TEMPERATURE: 96 F | HEART RATE: 74 BPM | DIASTOLIC BLOOD PRESSURE: 83 MMHG | RESPIRATION RATE: 16 BRPM | OXYGEN SATURATION: 97 % | SYSTOLIC BLOOD PRESSURE: 160 MMHG

## 2025-04-29 DIAGNOSIS — L60.0 INGROWN TOENAIL: ICD-10-CM

## 2025-04-29 DIAGNOSIS — L60.3 ONYCHODYSTROPHY: ICD-10-CM

## 2025-04-29 DIAGNOSIS — Z13.89 SCREENING FOR DIABETIC PERIPHERAL NEUROPATHY: ICD-10-CM

## 2025-04-29 DIAGNOSIS — E11.9 DIABETES MELLITUS TYPE 2, NONINSULIN DEPENDENT (H): ICD-10-CM

## 2025-04-29 DIAGNOSIS — E11.42 DIABETIC POLYNEUROPATHY ASSOCIATED WITH TYPE 2 DIABETES MELLITUS (H): Primary | ICD-10-CM

## 2025-04-29 PROCEDURE — 11730 AVULSION NAIL PLATE SIMPLE 1: CPT | Performed by: PODIATRIST

## 2025-04-29 PROCEDURE — G0463 HOSPITAL OUTPT CLINIC VISIT: HCPCS

## 2025-04-29 PROCEDURE — 11721 DEBRIDE NAIL 6 OR MORE: CPT | Performed by: PODIATRIST

## 2025-04-29 ASSESSMENT — PAIN SCALES - GENERAL: PAINLEVEL_OUTOF10: NO PAIN (0)

## 2025-04-29 NOTE — PROGRESS NOTES
Subjective Finding:    Chief compalint: Patient presents with:  Back Pain: With neck pain   , Pain Scale: 3/10, Intensity: dull, Duration: 1 weeks, Radiating: no.    Date of injury:     Activities that the pain restricts:   Home/household/hobbies/social activities: Yes.  Work duties: Yes.  Sleep: No.  Makes symptoms better: rest.  Makes symptoms worse: lumbar flexion and cervical flexion.  Have you seen anyone else for the symptoms? No.  Work related: No.  Automobile related injury: No.    Objective and Assessment:    Posture Analysis:   High shoulder: .  Head tilt: .  High iliac crest: .  Head carriage: neutral.  Thoracic Kyphosis: neutral.  Lumbar Lordosis: forward.    Lumbar Range of Motion: extension decreased.  Cervical Range of Motion: extension decreased.  Thoracic Range of Motion: .  Extremity Range of Motion: .    Palpation:   Quad lumb: bilateral, referred pain: no    Segmental dysfunction pre-treatment and treatment area: C5, C6, T5, L4, and L5.    Assessment post-treatment:  Cervical: ROM increased.  Thoracic: ROM increased.  Lumbar: ROM increased.    Comments: .      Complicating Factors: .    Procedure(s):  CMT:  99377 Chiropractic manipulative treatment 3-4 regions performed   Cervical: Diversified, See above for level, Supine, Thoracic: Diversified, See above for level, Prone, and Lumbar: Diversified, See above for level, Side posture    Modalities:  None performed this visit    Therapeutic procedures:  None    Plan:  Treatment plan: PRN.  Instructed patient: stretch as instructed at visit.  Short term goals: increase ROM.  Long term goals: increase ADL.  Prognosis: very good.
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
## 2025-04-30 ENCOUNTER — OFFICE VISIT (OUTPATIENT)
Dept: CHIROPRACTIC MEDICINE | Facility: OTHER | Age: 70
End: 2025-04-30
Attending: CHIROPRACTOR
Payer: COMMERCIAL

## 2025-04-30 DIAGNOSIS — M54.50 ACUTE BILATERAL LOW BACK PAIN WITHOUT SCIATICA: ICD-10-CM

## 2025-04-30 DIAGNOSIS — M99.01 SEGMENTAL AND SOMATIC DYSFUNCTION OF CERVICAL REGION: ICD-10-CM

## 2025-04-30 DIAGNOSIS — M99.02 SEGMENTAL AND SOMATIC DYSFUNCTION OF THORACIC REGION: ICD-10-CM

## 2025-04-30 DIAGNOSIS — M99.03 SEGMENTAL AND SOMATIC DYSFUNCTION OF LUMBAR REGION: Primary | ICD-10-CM

## 2025-05-01 NOTE — PROGRESS NOTES
Subjective Finding:    Chief compalint: Patient presents with:  Back Pain: With neck pain   , Pain Scale: 3/10, Intensity: sharp, Duration: 1 weeks, Radiating: no.    Date of injury:     Activities that the pain restricts:   Home/household/hobbies/social activities: Yes.  Work duties: Yes.  Sleep: No.  Makes symptoms better: rest.  Makes symptoms worse: lumbar flexion.  Have you seen anyone else for the symptoms? No.  Work related: No.  Automobile related injury: No.    Objective and Assessment:    Posture Analysis:   High shoulder: left.  Head tilt: left.  High iliac crest: left.  Head carriage: neutral.  Thoracic Kyphosis: neutral.  Lumbar Lordosis: forward.    Lumbar Range of Motion: extension decreased.  Cervical Range of Motion: left rotation decreased.  Thoracic Range of Motion: .  Extremity Range of Motion: .    Palpation:   Quad lumb: bilateral, referred pain: no  Traps: dull pain, referred pain: no    Segmental dysfunction pre-treatment and treatment area: C4, C5, T5, L3, and L4.    Assessment post-treatment:  Cervical: ROM increased.  Thoracic: ROM increased.  Lumbar: ROM increased.    Comments: .      Complicating Factors: .    Procedure(s):  CMT:  65597 Chiropractic manipulative treatment 3-4 regions performed   Cervical: Diversified, See above for level, Supine, Thoracic: Diversified, See above for level, Prone, and Lumbar: Diversified, See above for level, Supine    Modalities:  None performed this visit    Therapeutic procedures:  None    Plan:  Treatment plan: PRN.  Instructed patient: stretch as instructed at visit.  Short term goals: reduce pain.  Long term goals: increase ADL.  Prognosis: good.

## 2025-05-13 ENCOUNTER — OFFICE VISIT (OUTPATIENT)
Dept: CHIROPRACTIC MEDICINE | Facility: OTHER | Age: 70
End: 2025-05-13
Attending: CHIROPRACTOR
Payer: COMMERCIAL

## 2025-05-13 DIAGNOSIS — M99.03 SEGMENTAL AND SOMATIC DYSFUNCTION OF LUMBAR REGION: Primary | ICD-10-CM

## 2025-05-13 DIAGNOSIS — M54.50 ACUTE BILATERAL LOW BACK PAIN WITHOUT SCIATICA: ICD-10-CM

## 2025-05-13 DIAGNOSIS — M99.01 SEGMENTAL AND SOMATIC DYSFUNCTION OF CERVICAL REGION: ICD-10-CM

## 2025-05-13 DIAGNOSIS — M99.02 SEGMENTAL AND SOMATIC DYSFUNCTION OF THORACIC REGION: ICD-10-CM

## 2025-05-13 PROCEDURE — 98941 CHIROPRACT MANJ 3-4 REGIONS: CPT | Mod: AT | Performed by: CHIROPRACTOR

## 2025-05-20 NOTE — PROGRESS NOTES
Subjective Finding:    Chief compalint: Patient presents with:  Back Pain: With neck pain   , Pain Scale: 2/10, Intensity: sharp, Duration: 1 months, Radiating: bilateral buttock.    Date of injury:     Activities that the pain restricts:   Home/household/hobbies/social activities: Yes.  Work duties: Yes.  Sleep: No.  Makes symptoms better: rest.  Makes symptoms worse: lumbar extension, lumbar flexion, and cervical flexion.  Have you seen anyone else for the symptoms? No.  Work related: No.  Automobile related injury: No.    Objective and Assessment:    Posture Analysis:   High shoulder: .  Head tilt: .  High iliac crest: .  Head carriage: neutral.  Thoracic Kyphosis: neutral.  Lumbar Lordosis: forward.    Lumbar Range of Motion: extension decreased.  Cervical Range of Motion: extension decreased.  Thoracic Range of Motion: .  Extremity Range of Motion: .    Palpation:   Quad lumb: left, referred pain: no    Segmental dysfunction pre-treatment and treatment area: C2, C3, T5, L4, and L5.    Assessment post-treatment:  Cervical: ROM increased.  Thoracic: ROM increased.  Lumbar: ROM increased.    Comments: .      Complicating Factors: .    Procedure(s):  [facility]:  06032 Chiropractic manipulative treatment 3-4 regions performed   Cervical: Diversified, See above for level, Supine, Thoracic: Diversified, See above for level, Prone, and Lumbar: Diversified, See above for level, Side posture    Modalities:  None performed this visit    Therapeutic procedures:  None    Plan:  Treatment plan: PRN.  Instructed patient: stretch as instructed at visit.  Short term goals: reduce pain.  Long term goals: increase ADL.  Prognosis: very good.
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
## 2025-05-22 ENCOUNTER — OFFICE VISIT (OUTPATIENT)
Dept: FAMILY MEDICINE | Facility: OTHER | Age: 70
End: 2025-05-22
Attending: NURSE PRACTITIONER
Payer: COMMERCIAL

## 2025-05-22 VITALS
RESPIRATION RATE: 18 BRPM | HEIGHT: 71 IN | TEMPERATURE: 98.3 F | SYSTOLIC BLOOD PRESSURE: 124 MMHG | HEART RATE: 78 BPM | BODY MASS INDEX: 29.54 KG/M2 | OXYGEN SATURATION: 97 % | DIASTOLIC BLOOD PRESSURE: 64 MMHG

## 2025-05-22 DIAGNOSIS — G89.29 CHRONIC RIGHT-SIDED LOW BACK PAIN WITH RIGHT-SIDED SCIATICA: Primary | ICD-10-CM

## 2025-05-22 DIAGNOSIS — M54.41 CHRONIC RIGHT-SIDED LOW BACK PAIN WITH RIGHT-SIDED SCIATICA: Primary | ICD-10-CM

## 2025-05-22 PROCEDURE — 3074F SYST BP LT 130 MM HG: CPT | Performed by: NURSE PRACTITIONER

## 2025-05-22 PROCEDURE — 99213 OFFICE O/P EST LOW 20 MIN: CPT | Performed by: NURSE PRACTITIONER

## 2025-05-22 PROCEDURE — 3078F DIAST BP <80 MM HG: CPT | Performed by: NURSE PRACTITIONER

## 2025-05-22 PROCEDURE — 1125F AMNT PAIN NOTED PAIN PRSNT: CPT | Performed by: NURSE PRACTITIONER

## 2025-05-22 PROCEDURE — G0463 HOSPITAL OUTPT CLINIC VISIT: HCPCS

## 2025-05-22 RX ORDER — BACLOFEN 20 MG/1
20 TABLET ORAL 3 TIMES DAILY PRN
Qty: 90 TABLET | Refills: 2 | Status: SHIPPED | OUTPATIENT
Start: 2025-05-22

## 2025-05-22 ASSESSMENT — PAIN SCALES - PAIN ENJOYMENT GENERAL ACTIVITY SCALE (PEG)
INTERFERED_ENJOYMENT_LIFE: 5
INTERFERED_GENERAL_ACTIVITY: 5
AVG_PAIN_PASTWEEK: 6
INTERFERED_GENERAL_ACTIVITY: 5
PEG_TOTALSCORE: 5.33
PEG_TOTALSCORE: 5.33
INTERFERED_ENJOYMENT_LIFE: 5
AVG_PAIN_PASTWEEK: 6

## 2025-05-22 ASSESSMENT — PATIENT HEALTH QUESTIONNAIRE - PHQ9
10. IF YOU CHECKED OFF ANY PROBLEMS, HOW DIFFICULT HAVE THESE PROBLEMS MADE IT FOR YOU TO DO YOUR WORK, TAKE CARE OF THINGS AT HOME, OR GET ALONG WITH OTHER PEOPLE: SOMEWHAT DIFFICULT
SUM OF ALL RESPONSES TO PHQ QUESTIONS 1-9: 5
SUM OF ALL RESPONSES TO PHQ QUESTIONS 1-9: 5

## 2025-05-22 ASSESSMENT — PAIN SCALES - GENERAL: PAINLEVEL_OUTOF10: MODERATE PAIN (5)

## 2025-05-26 PROBLEM — E78.2 MIXED HYPERLIPIDEMIA: Status: ACTIVE | Noted: 2019-01-16

## 2025-05-26 PROBLEM — F32.1 CURRENT MODERATE EPISODE OF MAJOR DEPRESSIVE DISORDER (H): Status: ACTIVE | Noted: 2019-01-16

## 2025-05-26 PROBLEM — Z87.820 HISTORY OF TRAUMATIC BRAIN INJURY: Status: ACTIVE | Noted: 2025-05-26

## 2025-05-26 PROBLEM — F41.1 GENERALIZED ANXIETY DISORDER: Status: ACTIVE | Noted: 2019-01-16

## 2025-05-26 PROBLEM — Z87.2 HISTORY OF ACTINIC KERATOSES: Status: RESOLVED | Noted: 2018-03-02 | Resolved: 2025-05-26

## 2025-05-26 RX ORDER — BLOOD SUGAR DIAGNOSTIC
1 STRIP MISCELLANEOUS DAILY
COMMUNITY
Start: 2024-12-17

## 2025-05-26 RX ORDER — METHYLPHENIDATE HYDROCHLORIDE 20 MG/1
20 TABLET ORAL 2 TIMES DAILY
COMMUNITY
Start: 2024-12-03 | End: 2025-06-06

## 2025-05-27 ENCOUNTER — APPOINTMENT (OUTPATIENT)
Dept: MRI IMAGING | Facility: HOSPITAL | Age: 70
End: 2025-05-27
Attending: PHYSICIAN ASSISTANT
Payer: COMMERCIAL

## 2025-05-27 ENCOUNTER — APPOINTMENT (OUTPATIENT)
Dept: GENERAL RADIOLOGY | Facility: HOSPITAL | Age: 70
End: 2025-05-27
Attending: PHYSICIAN ASSISTANT
Payer: COMMERCIAL

## 2025-05-27 ENCOUNTER — HOSPITAL ENCOUNTER (EMERGENCY)
Facility: HOSPITAL | Age: 70
Discharge: HOME | End: 2025-05-27
Attending: PHYSICIAN ASSISTANT
Payer: COMMERCIAL

## 2025-05-27 ENCOUNTER — APPOINTMENT (OUTPATIENT)
Dept: CT IMAGING | Facility: HOSPITAL | Age: 70
End: 2025-05-27
Attending: PHYSICIAN ASSISTANT
Payer: COMMERCIAL

## 2025-05-27 ENCOUNTER — TELEPHONE (OUTPATIENT)
Dept: FAMILY MEDICINE | Facility: OTHER | Age: 70
End: 2025-05-27

## 2025-05-27 ENCOUNTER — RESULTS FOLLOW-UP (OUTPATIENT)
Dept: FAMILY MEDICINE | Facility: OTHER | Age: 70
End: 2025-05-27

## 2025-05-27 VITALS
RESPIRATION RATE: 16 BRPM | OXYGEN SATURATION: 97 % | TEMPERATURE: 97.5 F | DIASTOLIC BLOOD PRESSURE: 63 MMHG | SYSTOLIC BLOOD PRESSURE: 140 MMHG | HEART RATE: 66 BPM

## 2025-05-27 DIAGNOSIS — E83.42 HYPOMAGNESEMIA: ICD-10-CM

## 2025-05-27 DIAGNOSIS — M54.16 LUMBAR BACK PAIN WITH RADICULOPATHY AFFECTING RIGHT LOWER EXTREMITY: ICD-10-CM

## 2025-05-27 LAB
ALBUMIN SERPL BCG-MCNC: 4.1 G/DL (ref 3.5–5.2)
ALP SERPL-CCNC: 86 U/L (ref 40–150)
ALT SERPL W P-5'-P-CCNC: 12 U/L (ref 0–50)
ANION GAP SERPL CALCULATED.3IONS-SCNC: 10 MMOL/L (ref 7–15)
AST SERPL W P-5'-P-CCNC: 29 U/L (ref 0–45)
BASOPHILS # BLD AUTO: 0.1 10E3/UL (ref 0–0.2)
BASOPHILS NFR BLD AUTO: 1 %
BILIRUB SERPL-MCNC: 0.5 MG/DL
BUN SERPL-MCNC: 16.3 MG/DL (ref 8–23)
CALCIUM SERPL-MCNC: 9.7 MG/DL (ref 8.8–10.4)
CHLORIDE SERPL-SCNC: 105 MMOL/L (ref 98–107)
CK SERPL-CCNC: 244 U/L (ref 26–192)
CREAT SERPL-MCNC: 0.81 MG/DL (ref 0.51–0.95)
CRP SERPL-MCNC: <3 MG/L
EGFRCR SERPLBLD CKD-EPI 2021: 77 ML/MIN/1.73M2
EOSINOPHIL # BLD AUTO: 0.2 10E3/UL (ref 0–0.7)
EOSINOPHIL NFR BLD AUTO: 3 %
ERYTHROCYTE [DISTWIDTH] IN BLOOD BY AUTOMATED COUNT: 12.9 % (ref 10–15)
GLUCOSE SERPL-MCNC: 112 MG/DL (ref 70–99)
HCO3 SERPL-SCNC: 27 MMOL/L (ref 22–29)
HCT VFR BLD AUTO: 40.6 % (ref 35–47)
HGB BLD-MCNC: 13.6 G/DL (ref 11.7–15.7)
HOLD SPECIMEN: NORMAL
HOLD SPECIMEN: NORMAL
IMM GRANULOCYTES # BLD: 0 10E3/UL
IMM GRANULOCYTES NFR BLD: 0 %
LACTATE SERPL-SCNC: 1.3 MMOL/L (ref 0.7–2)
LYMPHOCYTES # BLD AUTO: 1.4 10E3/UL (ref 0.8–5.3)
LYMPHOCYTES NFR BLD AUTO: 18 %
MAGNESIUM SERPL-MCNC: 1.5 MG/DL (ref 1.7–2.3)
MCH RBC QN AUTO: 30.2 PG (ref 26.5–33)
MCHC RBC AUTO-ENTMCNC: 33.5 G/DL (ref 31.5–36.5)
MCV RBC AUTO: 90 FL (ref 78–100)
MONOCYTES # BLD AUTO: 0.6 10E3/UL (ref 0–1.3)
MONOCYTES NFR BLD AUTO: 8 %
NEUTROPHILS # BLD AUTO: 5.6 10E3/UL (ref 1.6–8.3)
NEUTROPHILS NFR BLD AUTO: 71 %
NRBC # BLD AUTO: 0 10E3/UL
NRBC BLD AUTO-RTO: 0 /100
PLATELET # BLD AUTO: 177 10E3/UL (ref 150–450)
POTASSIUM SERPL-SCNC: 4.3 MMOL/L (ref 3.4–5.3)
PROCALCITONIN SERPL IA-MCNC: 0.03 NG/ML
PROT SERPL-MCNC: 6.9 G/DL (ref 6.4–8.3)
RBC # BLD AUTO: 4.5 10E6/UL (ref 3.8–5.2)
SODIUM SERPL-SCNC: 142 MMOL/L (ref 135–145)
TSH SERPL DL<=0.005 MIU/L-ACNC: 0.63 UIU/ML (ref 0.3–4.2)
WBC # BLD AUTO: 7.9 10E3/UL (ref 4–11)

## 2025-05-27 PROCEDURE — 80053 COMPREHEN METABOLIC PANEL: CPT | Performed by: PHYSICIAN ASSISTANT

## 2025-05-27 PROCEDURE — 36415 COLL VENOUS BLD VENIPUNCTURE: CPT | Performed by: PHYSICIAN ASSISTANT

## 2025-05-27 PROCEDURE — 85014 HEMATOCRIT: CPT | Performed by: PHYSICIAN ASSISTANT

## 2025-05-27 PROCEDURE — 86140 C-REACTIVE PROTEIN: CPT | Performed by: PHYSICIAN ASSISTANT

## 2025-05-27 PROCEDURE — 72131 CT LUMBAR SPINE W/O DYE: CPT

## 2025-05-27 PROCEDURE — 73502 X-RAY EXAM HIP UNI 2-3 VIEWS: CPT | Mod: 26 | Performed by: RADIOLOGY

## 2025-05-27 PROCEDURE — 99284 EMERGENCY DEPT VISIT MOD MDM: CPT | Mod: 25

## 2025-05-27 PROCEDURE — 83605 ASSAY OF LACTIC ACID: CPT | Performed by: PHYSICIAN ASSISTANT

## 2025-05-27 PROCEDURE — 72148 MRI LUMBAR SPINE W/O DYE: CPT

## 2025-05-27 PROCEDURE — 84443 ASSAY THYROID STIM HORMONE: CPT | Performed by: PHYSICIAN ASSISTANT

## 2025-05-27 PROCEDURE — 72131 CT LUMBAR SPINE W/O DYE: CPT | Mod: 26 | Performed by: RADIOLOGY

## 2025-05-27 PROCEDURE — 99284 EMERGENCY DEPT VISIT MOD MDM: CPT | Performed by: PHYSICIAN ASSISTANT

## 2025-05-27 PROCEDURE — 72148 MRI LUMBAR SPINE W/O DYE: CPT | Mod: 26 | Performed by: RADIOLOGY

## 2025-05-27 PROCEDURE — 83735 ASSAY OF MAGNESIUM: CPT | Performed by: PHYSICIAN ASSISTANT

## 2025-05-27 PROCEDURE — 85025 COMPLETE CBC W/AUTO DIFF WBC: CPT | Performed by: PHYSICIAN ASSISTANT

## 2025-05-27 PROCEDURE — 82550 ASSAY OF CK (CPK): CPT | Performed by: PHYSICIAN ASSISTANT

## 2025-05-27 PROCEDURE — 73502 X-RAY EXAM HIP UNI 2-3 VIEWS: CPT

## 2025-05-27 PROCEDURE — 84145 PROCALCITONIN (PCT): CPT | Performed by: PHYSICIAN ASSISTANT

## 2025-05-27 RX ORDER — METHYLPREDNISOLONE 4 MG/1
TABLET ORAL
Qty: 21 TABLET | Refills: 0 | Status: SHIPPED | OUTPATIENT
Start: 2025-05-27 | End: 2025-06-06

## 2025-05-27 RX ORDER — HYDROCODONE BITARTRATE AND ACETAMINOPHEN 5; 325 MG/1; MG/1
1 TABLET ORAL EVERY 6 HOURS PRN
Qty: 8 TABLET | Refills: 0 | Status: SHIPPED | OUTPATIENT
Start: 2025-05-27 | End: 2025-06-03

## 2025-05-27 ASSESSMENT — ENCOUNTER SYMPTOMS
FEVER: 0
ABDOMINAL PAIN: 0
FATIGUE: 1
DIZZINESS: 0
HEADACHES: 0
SHORTNESS OF BREATH: 0
APPETITE CHANGE: 0
BACK PAIN: 1
COUGH: 0
ACTIVITY CHANGE: 1

## 2025-05-27 ASSESSMENT — ACTIVITIES OF DAILY LIVING (ADL)
ADLS_ACUITY_SCORE: 47
ADLS_ACUITY_SCORE: 47
ADLS_ACUITY_SCORE: 45
ADLS_ACUITY_SCORE: 47

## 2025-05-27 ASSESSMENT — COLUMBIA-SUICIDE SEVERITY RATING SCALE - C-SSRS
1. IN THE PAST MONTH, HAVE YOU WISHED YOU WERE DEAD OR WISHED YOU COULD GO TO SLEEP AND NOT WAKE UP?: NO
2. HAVE YOU ACTUALLY HAD ANY THOUGHTS OF KILLING YOURSELF IN THE PAST MONTH?: NO
6. HAVE YOU EVER DONE ANYTHING, STARTED TO DO ANYTHING, OR PREPARED TO DO ANYTHING TO END YOUR LIFE?: NO

## 2025-05-27 NOTE — TELEPHONE ENCOUNTER
"Pt called and reports having some issues with both her lower extremities.  Pt reports the weakness is more on her right then her left.  Pt reports having some numbness and buckling to both lower extremities with the numbness being in her feet.  Pt reports she is unable to straighten out her right leg.  Pt stated that she has falling twice with her last fall on Saturday.   Pt stated that first fall was off her toilet and her second fall was from her bed.  Pt stated that the fall was more related to feeling dizzy then her lower extremities.  Pt is unsure if she hit her head.  Pt report breathing hard which was noticeable over the phone.  Pt stated that she was having what she feel is like heart burn when asked about chest pain.  Pt was unable to give an answer to weakness on one side of the body.  Or if she had any facial drooping.  Pt reported a history of \"small strokes\" in the past.    Pt was encouraged to go tot he ER to get evaluated and to have someone bring her.  Pt was in agreement with the plan and will reach out if needing a follow up.     "

## 2025-05-27 NOTE — TELEPHONE ENCOUNTER
Symptom or reason needing to speak to RN: patient is having hard time walking, her right leg is very weak and she can't keep it straight. and she is having trouble remember stuff over the weekend      Best number to return call: [phone]      Best time to return call: Anytime

## 2025-05-27 NOTE — ED TRIAGE NOTES
Pt reports a 5 day hx of lower leg weakness, low back pain, headaches, and generalized weakness. Pt reports having 2 falls over the weekend. Pt is not on blood thinners. Pt is was able to walk into the triage room with some difficulty, pt was just started on baclofen about a week ago, pt is worried this could be the cause. Pt states she has not taken it today, but last took a dose on Sunday morning.

## 2025-05-27 NOTE — DISCHARGE INSTRUCTIONS
Please start Medrol Dosepak today.  Continue to ambulate.  Please follow-up in the clinic this week for recheck and to discuss your MRI results.  Please return here for any other questions or concerns.
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
## 2025-05-28 DIAGNOSIS — G89.29 CHRONIC RIGHT-SIDED LOW BACK PAIN WITH RIGHT-SIDED SCIATICA: Primary | ICD-10-CM

## 2025-05-28 DIAGNOSIS — M48.061 SPINAL STENOSIS OF LUMBAR REGION WITHOUT NEUROGENIC CLAUDICATION: ICD-10-CM

## 2025-05-28 DIAGNOSIS — M54.41 CHRONIC RIGHT-SIDED LOW BACK PAIN WITH RIGHT-SIDED SCIATICA: Primary | ICD-10-CM

## 2025-05-30 NOTE — PROGRESS NOTES
"  {PROVIDER CHARTING PREFERENCE:135731}    Subjective   Marisol Red is a 69 year old, presenting for the following health issues:  Follow Up (ER )    Baclofen difficulty walking and talking  ER 5/27  Medrol Doesepak in ER  And worsened back pain, right hip hurts more then the lower back currently  Better off baclofen            6/3/2025     1:29 PM   Additional Questions   Roomed by Johnna SHEETS   Accompanied by self         6/3/2025     1:29 PM   Patient Reported Additional Medications   Patient reports taking the following new medications none     HPI      ED/UC Followup:    Facility:  [facility]  Date of visit: 05/27/2025  Reason for visit: Weakness; Lumbar back pain  Current Status: slightly improving     {additonal problems for provider to add (Optional):076158}    {ROS Picklists (Optional):652203}      Objective    /66 (BP Location: Left arm, Patient Position: Sitting, Cuff Size: Adult Large)   Pulse 84   Temp 97.4  F (36.3  C) (Tympanic)   Resp 18   Ht 1.803 m (5' 11\")   Wt 91.1 kg (200 lb 12.8 oz)   SpO2 98%   BMI 28.01 kg/m    Body mass index is 28.01 kg/m .  Physical Exam   {Exam List (Optional):715309}    {Diagnostic Test Results (Optional):190070}        Signed Electronically by: GUZMAN Peguero CNP  {Email feedback regarding this note to [email or web address]   :[id number]}  "

## 2025-06-03 ENCOUNTER — OFFICE VISIT (OUTPATIENT)
Dept: FAMILY MEDICINE | Facility: OTHER | Age: 70
End: 2025-06-03
Attending: NURSE PRACTITIONER
Payer: COMMERCIAL

## 2025-06-03 VITALS
OXYGEN SATURATION: 98 % | BODY MASS INDEX: 28.11 KG/M2 | TEMPERATURE: 97.4 F | SYSTOLIC BLOOD PRESSURE: 126 MMHG | WEIGHT: 200.8 LBS | HEART RATE: 84 BPM | RESPIRATION RATE: 18 BRPM | HEIGHT: 71 IN | DIASTOLIC BLOOD PRESSURE: 66 MMHG

## 2025-06-03 DIAGNOSIS — G89.29 CHRONIC RIGHT-SIDED LOW BACK PAIN WITH RIGHT-SIDED SCIATICA: ICD-10-CM

## 2025-06-03 DIAGNOSIS — S32.591D CLOSED FRACTURE OF RIGHT INFERIOR PUBIC RAMUS WITH ROUTINE HEALING, SUBSEQUENT ENCOUNTER: Primary | ICD-10-CM

## 2025-06-03 DIAGNOSIS — M54.41 CHRONIC RIGHT-SIDED LOW BACK PAIN WITH RIGHT-SIDED SCIATICA: ICD-10-CM

## 2025-06-03 DIAGNOSIS — M48.061 SPINAL STENOSIS OF LUMBAR REGION WITHOUT NEUROGENIC CLAUDICATION: ICD-10-CM

## 2025-06-03 RX ORDER — CLONAZEPAM 1 MG/1
0.5 TABLET ORAL 3 TIMES DAILY PRN
COMMUNITY
Start: 2025-05-30

## 2025-06-03 RX ORDER — GABAPENTIN 600 MG/1
600 TABLET ORAL 2 TIMES DAILY
Qty: 60 TABLET | Refills: 0 | Status: SHIPPED | OUTPATIENT
Start: 2025-06-03

## 2025-06-03 ASSESSMENT — PAIN SCALES - GENERAL: PAINLEVEL_OUTOF10: MODERATE PAIN (5)

## 2025-06-20 ENCOUNTER — TELEPHONE (OUTPATIENT)
Dept: FAMILY MEDICINE | Facility: OTHER | Age: 70
End: 2025-06-20

## 2025-06-20 NOTE — TELEPHONE ENCOUNTER
Attempt # 1  Outcome: Left Message   Comment: LVM for patient to call to schedule a diabetic check with Nena along with labs.

## 2025-06-20 NOTE — TELEPHONE ENCOUNTER
Please contact patient to schedule a diabetic check with Nena HINDS CNP  with non-fasting labs.  Thank you

## 2025-06-26 DIAGNOSIS — M54.41 CHRONIC RIGHT-SIDED LOW BACK PAIN WITH RIGHT-SIDED SCIATICA: ICD-10-CM

## 2025-06-26 DIAGNOSIS — G89.29 CHRONIC RIGHT-SIDED LOW BACK PAIN WITH RIGHT-SIDED SCIATICA: ICD-10-CM

## 2025-06-26 RX ORDER — GABAPENTIN 600 MG/1
600 TABLET ORAL 2 TIMES DAILY
Qty: 60 TABLET | Refills: 0 | Status: SHIPPED | OUTPATIENT
Start: 2025-06-26

## 2025-06-30 PROBLEM — I69.30 LATE EFFECT OF LACUNAR INFARCTION: Status: ACTIVE | Noted: 2018-09-09

## 2025-06-30 RX ORDER — FAMOTIDINE 20 MG/1
20 TABLET, FILM COATED ORAL AT BEDTIME
COMMUNITY

## 2025-06-30 RX ORDER — CETIRIZINE HYDROCHLORIDE 10 MG/1
10 TABLET ORAL DAILY
COMMUNITY
Start: 2024-12-20

## 2025-06-30 RX ORDER — METFORMIN HYDROCHLORIDE 500 MG/1
500 TABLET, EXTENDED RELEASE ORAL 2 TIMES DAILY WITH MEALS
COMMUNITY

## 2025-06-30 RX ORDER — METHYLPHENIDATE HYDROCHLORIDE 20 MG/1
20 TABLET ORAL 2 TIMES DAILY
COMMUNITY
Start: 2025-06-13

## 2025-06-30 RX ORDER — HYDROCODONE BITARTRATE AND ACETAMINOPHEN 5; 325 MG/1; MG/1
1 TABLET ORAL EVERY 6 HOURS
COMMUNITY
Start: 2025-05-27

## 2025-07-01 ENCOUNTER — OFFICE VISIT (OUTPATIENT)
Dept: PODIATRY | Facility: OTHER | Age: 70
End: 2025-07-01
Attending: PODIATRIST
Payer: COMMERCIAL

## 2025-07-01 VITALS — OXYGEN SATURATION: 98 % | HEART RATE: 84 BPM | SYSTOLIC BLOOD PRESSURE: 122 MMHG | DIASTOLIC BLOOD PRESSURE: 75 MMHG

## 2025-07-01 DIAGNOSIS — M76.71 PERONEAL TENDINITIS OF RIGHT LOWER EXTREMITY: ICD-10-CM

## 2025-07-01 DIAGNOSIS — M79.674 PAIN OF TOE OF RIGHT FOOT: ICD-10-CM

## 2025-07-01 DIAGNOSIS — E11.9 DIABETES MELLITUS TYPE 2, NONINSULIN DEPENDENT (H): ICD-10-CM

## 2025-07-01 DIAGNOSIS — Z13.89 SCREENING FOR DIABETIC PERIPHERAL NEUROPATHY: ICD-10-CM

## 2025-07-01 DIAGNOSIS — L60.3 ONYCHODYSTROPHY: ICD-10-CM

## 2025-07-01 DIAGNOSIS — E11.42 DIABETIC POLYNEUROPATHY ASSOCIATED WITH TYPE 2 DIABETES MELLITUS (H): Primary | ICD-10-CM

## 2025-07-01 PROCEDURE — 11721 DEBRIDE NAIL 6 OR MORE: CPT | Performed by: PODIATRIST

## 2025-07-01 PROCEDURE — G0463 HOSPITAL OUTPT CLINIC VISIT: HCPCS

## 2025-07-01 ASSESSMENT — PAIN SCALES - GENERAL: PAINLEVEL_OUTOF10: SEVERE PAIN (7)

## 2025-07-01 NOTE — PROGRESS NOTES
Chief complaint: Patient presents with:  Consult: DFE      History of Present Illness: This 69-year-old NIIDM female is seen for follow-up management a diabetic foot exam and high risk nail debridement.    She had her DM inserts adjusted by the orthotist, Anat Alonso on 04/29/2025 because they were rubbing on her RIGHT heel. She received the shoes and inserts around April, 2025. She has not worn them a lot because they are uncomfortable. She spoke with the orthotist, Anat Alonso about this.    She says she gets burning, tingling, and numbness in her feet.    The patient says she is also having increased pain on her RIGHT lateral ankle. This started several weeks ago and is most painful when she is walking. She also has a little swelling in her  RIGHT third and fourth toes and she has mild discomfort from the toes when she steps on them. She previously tried to wear a silicone toe sleeve but it did  not help reduce the pain.    No further pedal complaints today.      Patient does not use tobacco products.     Last HbA1C was 5.2% on 12/31/2024.       Lab Results   Component Value Date    A1C 5.8 01/16/2019    A1C 5.5 05/29/2014    A1C 5.4 12/11/2013         Patient's HbA1C is checked at [facility]. She thinks it was in the 5's in May, 2024.      /75 (BP Location: Left arm, Patient Position: Sitting, Cuff Size: Adult Regular)   Pulse 84   SpO2 98%     Patient Active Problem List   Diagnosis    Chronic right-sided low back pain with right-sided sciatica    Neck pain    S/P TKR (total knee replacement), bilateral    Bradykinesia    Current moderate episode of major depressive disorder (H)    Type 2 diabetes mellitus without complication, without long-term current use of insulin (H)    Generalized anxiety disorder    Mixed hyperlipidemia    Benign essential hypertension    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    Traumatic brain injury (H)    Tardy ulnar nerve palsy    Sural neuritis    Social phobia    Posttraumatic  stress disorder    Polypharmacy    Other bipolar disorder (H)    Primary osteoarthritis of both knees    Obstructive sleep apnea syndrome    Myalgia    Lumbosacral spondylosis without myelopathy    Insulin resistance syndrome    Idiopathic hypersomnia with long sleep time    Other specified hypothyroidism    Hindfoot varus, acquired    Encephalopathy, unspecified    Degeneration of lumbar or lumbosacral intervertebral disc    COPD (chronic obstructive pulmonary disease) (H)    Chronic idiopathic constipation    Congenital contracture of gastrocnemius    Cervical spondylosis without myelopathy    Calcium deposits in tendon and bursa    BPPV (benign paroxysmal positional vertigo)    Dysphagia    Gastroesophageal reflux disease without esophagitis    Contracture, Achilles tendon    Generalized osteoarthrosis, involving multiple sites    Lack of coordination    Bilateral chronic knee pain    Pain in joint, shoulder region    Panic disorder without agoraphobia    Sensory hearing loss, bilateral    History of traumatic brain injury    Spinal stenosis of lumbar region without neurogenic claudication    Late effect of lacunar infarction       Past Surgical History:   Procedure Laterality Date    APPENDECTOMY      ARTHROPLASTY KNEE BILATERAL Bilateral     BLEPHAROPLASTY Bilateral 11/13/2013    B/L BROW LIFT AND B/L UPPER LID- DR ALBERT PLUNKETT    BOTOX INJECTION MEDICAL  06/16/2015    BLADDER INJECTION- DR GAGNON AT     [facility],SHOULDER REPLACEMENT Left     CHOLECYSTECTOMY      COLONOSCOPY      COLONOSCOPY N/A 03/07/2019    Procedure: DIAGNOSTIC COLONOSCOPY;  Surgeon: Thor Spencer MD;  Location: [facility]    COLONOSCOPY - HIM SCAN  10/07/2013    Colonoscopy with internal hemorrhoidectomy with Dr. Adela Marshall at [facility] - 10 year repeat recommended  10/2013    DEXA - HIM SCAN  01/14/2022    [facility]    ENDOSCOPY UPPER, COLONOSCOPY, COMBINED N/A 10/26/2023    Procedure: Upper endoscopy with biopsies and colonoscopy with  polypectomy;  Surgeon: Juan Alonso MD;  Location: [facility] OR    ENT SURGERY Bilateral     [id number]- MULTIPLE PERFORATED EAR DRUM REPAIRS/ B/L PE TUBES IN HER 40S    ESOPHAGOSCOPY, GASTROSCOPY, DUODENOSCOPY (EGD), DILATATION, COMBINED N/A 04/22/2021    Procedure: Upper Endoscopy with BIOPSY;  Surgeon: Juan Alonso MD;  Location: [facility] OR    EYELID SURGERY Left     LOWER LID LESION- DR TEMPLETON    FOOT SURGERY Bilateral     LEFT FOOT 2009- LEFT ACHILLES TENDON RELEASE X2/ 2008 R ACHILLES TENDON RELEASE X2/ 12/04/12 RIGHT FOOT REPAIR OF OLD FX    HYSTERECTOMY      INJECTION, STEROID, EPIDURAL  06/16/2021    FLUROSCOPICALLY GUIDED L4,L5 [place]IDURAL [place]EROID [place] [facility] [place]    OVARIAN CYST REMOVAL      1/4 OVART REMAINS    REPAIR TENDON ACHILLES      x4    REPLACEMENT TOTAL KNEE Bilateral 2006 2005-2006 DR BIBIANA MULTANI    SINUS SURGERY      WITH UPPP    TONSILLECTOMY & ADENOIDECTOMY      ULNAR NERVE TRANSPOSITION Left     DR BENTLEY AT [facility]       Current Outpatient Medications   Medication Sig Dispense Refill    ACCU-CHEK ASHLIE PLUS test strip 1 strip by In Vitro route daily.      albuterol (PROAIR HFA/PROVENTIL HFA/VENTOLIN HFA) 108 (90 Base) MCG/ACT inhaler       atorvastatin (LIPITOR) 80 MG tablet Take 80 mg by mouth daily      Budesonide-Formoterol Fumarate (SYMBICORT IN) Inhale 2 puffs into the lungs daily as needed       cetirizine (ZYRTEC) 10 MG tablet Take 10 mg by mouth daily.      cholecalciferol (VITAMIN  -D) 1000 UNITS capsule Take 1 capsule by mouth daily      clonazePAM (KLONOPIN) 1 MG tablet Take 0.5 mg by mouth 3 times daily as needed for anxiety.      EPINEPHrine (ANY BX GENERIC EQUIV) 0.3 MG/0.3ML injection 2-pack Inject 0.3 mg into the muscle as needed for anaphylaxis      famotidine (PEPCID) 20 MG tablet Take 20 mg by mouth at bedtime.      gabapentin (NEURONTIN) 300 MG capsule Take 1 capsule (300 mg) by mouth daily. - take gabapentin 300mg every afternoon daily in addition  to gabapentin 600mg every morning and 600mg every evening 30 capsule 0    gabapentin (NEURONTIN) 600 MG tablet Take 1 tablet (600 mg) by mouth 2 times daily. 60 tablet 0    HYDROcodone-acetaminophen (NORCO) 5-325 MG tablet Take 1 tablet by mouth every 6 hours.      levothyroxine (SYNTHROID/LEVOTHROID) 150 MCG tablet       lidocaine (LIDODERM) 5 % patch Place 2 patches onto the skin every 24 hours To prevent lidocaine toxicity, patient should be patch free for 12 hrs daily.      magic mouthwash suspension, diphenhydrAMINE, lidocaine, aluminum-magnesium & simethicone, (FIRST-MOUTHWASH BLM) compounding kit Swish and swallow 5-10 mLs in mouth every 6 hours as needed for mouth sores      metFORMIN (GLUCOPHAGE XR) 500 MG 24 hr tablet Take 500 mg by mouth 2 times daily (with meals).      methylphenidate (RITALIN) 20 MG tablet Take 20 mg by mouth 2 times daily.      mirtazapine (REMERON) 30 MG tablet       Multiple Vitamins-Minerals (CEROVITE SENIOR PO) Take 1 tablet by mouth daily      naproxen sodium (ANAPROX) 220 MG tablet Take 220 mg by mouth 2 times daily (with meals)      polyethylene glycol-propylene glycol (SYSTANE ULTRA) 0.4-0.3 % SOLN ophthalmic solution Place 1 drop into both eyes 4 times daily as needed for dry eyes      tiZANidine (ZANAFLEX) 4 MG capsule Take 4 mg by mouth 3 times daily as needed for muscle spasms      traZODone (DESYREL) 100 MG tablet Take 1 tablet (100 mg) by mouth At Bedtime Take 2 at bedtime 90 tablet      No current facility-administered medications for this visit.          Allergies   Allergen Reactions    Strawberry Extract Anaphylaxis    Aspirin GI Disturbance    Baclofen Other (See Comments)     Difficulty walking and talking     No Clinical Screening - See Comments Hives     Trees, dandelions, pussy willows, and flowers, strawberry       Family History   Problem Relation Age of Onset    Uterine Cancer Mother     Cerebrovascular Disease Mother     Cancer Father     Kidney failure  Father     Dupuytren's contracture Father     Glaucoma Father     No Known Problems Brother     No Known Problems Maternal Grandmother     Heart Disease Maternal Grandfather     Myocardial Infarction Maternal Grandfather     No Known Problems Paternal Grandmother     No Known Problems Paternal Grandfather     Diabetes Type 2  Other     Coronary Artery Disease Other     Eye Disorder No family hx of        Social History     Socioeconomic History    Marital status:      Spouse name: None    Number of children: None    Years of education: None    Highest education level: None   Occupational History    None   Social Needs    Financial resource strain: None    Food insecurity:     Worry: None     Inability: None    Transportation needs:     Medical: None     Non-medical: None   Tobacco Use    Smoking status: Never Smoker    Smokeless tobacco: Never Used   Substance and Sexual Activity    Alcohol use: None    Drug use: None    Sexual activity: None   Lifestyle    Physical activity:     Days per week: None     Minutes per session: None    Stress: None   Relationships    Social connections:     Talks on phone: None     Gets together: None     Attends Confucianism service: None     Active member of club or organization: None     Attends meetings of clubs or organizations: None     Relationship status: None    Intimate partner violence:     Fear of current or ex partner: None     Emotionally abused: None     Physically abused: None     Forced sexual activity: None   Other Topics Concern    Parent/sibling w/ CABG, MI or angioplasty before 65F 55M? Not Asked   Social History Narrative    None       ROS: 10 point ROS neg other than the symptoms noted above in the HPI.  EXAM  Constitutional: healthy, alert and no distress    Psychiatric: mentation appears normal and affect normal/bright      VASCULAR:  -Dorsalis pedis pulse +2/4 bilaterally   -Posterior tibial pulse +2/4 bilaterally   -Capillary refill time < 3 seconds to  hallux bilaterally   -Telangiectasias to bilateral foot and ankle  NEURO:  -Positive for paresthesias and burning to bilateral foot  -Epicritic and protective sensation ABSENT to the bilateral foot.  DERM:  -Skin temperature cold to bilateral foot    -Toenails normal length, thickened, dystrophic and discolored x 9  ---RIGHT hallux toenail absent (previous matrixectomy)     -Mild non-pitting edema to all lesser toes of the RIGHT foot    MSK:  -DORSIFLEXION contracture to MTPJ 2-5, LEFT with partially rigid  flexion contracture to PIPJ of digits 2-5, LEFT   ---Mild Pain on palpation to the bilateral distal toe  -Pain on palpation to the peroneal tendon just posterior to the lateral malleolus, RIGHT   -Mild adductovarus of the bilateral fifth digit with mild bony prominence to the medial IPJ of the RIGHT fifth digit    -Muscle strength of ankles +5/5 for dorsiflexion, plantarflexion, ABDUction and ADDuction b/l     ============================================================    ASSESSMENT:    (E11.42) Diabetic polyneuropathy associated with type 2 diabetes mellitus (H)  (primary encounter diagnosis)    (L60.3) Onychodystrophy    (M76.71) Peroneal tendinitis of right lower extremity    (M79.674) Pain of toe of right foot    (E11.9) Diabetes mellitus type 2, noninsulin dependent (H)    (Z13.89) Screening for diabetic peripheral neuropathy      PLAN:  -Patient evaluated and examined. Treatment options discussed with no educational barriers noted.    -High risk toenail debridement x 9 toenails without incident  ---RIGHT hallux toenail absent    -Diabetic Foot Education provided. This included checking the feet daily looking for new new blisters or wounds, wearing shoes at all times when walking including around the house, and avoiding lotion application between the toes. If there are any signs of infection, the patient should present to the ED as soon as possible. Infections of the foot can be life threatening or lead to  amputations of the foot or leg.    -DM shoes: Received in March, 2023 through the orthotist, Anat Alonso. DM inserts adjusted by Anat on 09/03/2024 to reduce pressure at the toe fracture sites of the RIGHT foot.  ---Patient is being measured for new shoes through the orthotist, Anat Alonso on 02/18/2025.    Diabetes Mellitus: Patient's DM is managed by their PCP. The DM appears to be stable. Patient's last HbA1C was 5.2% on 12/31/2024 per [facility] at [facility].    --------------------------------------------------------------------    Peroneal tendonitis:  -Discussed peroneal tendon pain including potential etiologies and treatment options. Patient's pain was likely started due to a combination of factors that can include but are not limited to worn or improper shoe gear, trauma, overuse, sudden change in shoe gear, change in activity, imbalanced biomechanics (such as pes cavus, pes planus, gastroc equinus deformity of the calf muscles). The patient has a history of foot surgeries and injuries which is contributing to this pain.  ---Discussed conservative treatment options including compression socks, icing, elevating, resting, orthotics, physical therapy, change in shoe gear (including proper shoe gear around the house). At this time, patient would like to proceed with the below treatment options.  ---Added a mild valgus wedge to the RIGHT shoe. Will see if this reduces the pressure on  the tendon when she is walking.  ---Work on ROM of the RIGHT ankle. Roll the ankle in circles and draw the alphabet with the ankle daily.  -Stretching: Stressed the importance of stretching the calf muscles to increase dorsiflexion ROM at the ankles. Educated patient on how this contributes to plantar fascia pain. If possible, patient should aim to stretch the calf muscles for a combined total of one hour per day.  RIGHT third and fourth toe pain: Applied a Crest pad beneath the toes. Patient denies any history of  injury. Will attempt toe Crest pad to reduce pressure on the distal toes when she is walking. Remove the pad at night and reapply during the day. Call the clinic if pain does not improve.    -This is an acute, uncomplicated illness/injury with OTC treatment options reviewed.     -Patient in agreement with the above treatment plan and all of patient's questions were answered.      Return to clinic 63+ days for a diabetic foot exam and high risk nail debridement      Lexi Felix DPM

## 2025-07-07 ENCOUNTER — LAB (OUTPATIENT)
Dept: LAB | Facility: OTHER | Age: 70
End: 2025-07-07
Attending: NURSE PRACTITIONER
Payer: COMMERCIAL

## 2025-07-07 ENCOUNTER — OFFICE VISIT (OUTPATIENT)
Dept: FAMILY MEDICINE | Facility: OTHER | Age: 70
End: 2025-07-07
Attending: NURSE PRACTITIONER
Payer: COMMERCIAL

## 2025-07-07 VITALS
DIASTOLIC BLOOD PRESSURE: 66 MMHG | WEIGHT: 196 LBS | HEIGHT: 71 IN | SYSTOLIC BLOOD PRESSURE: 122 MMHG | TEMPERATURE: 98.2 F | OXYGEN SATURATION: 98 % | RESPIRATION RATE: 18 BRPM | HEART RATE: 76 BPM | BODY MASS INDEX: 27.44 KG/M2

## 2025-07-07 DIAGNOSIS — R20.2 NUMBNESS AND TINGLING OF BOTH FEET: ICD-10-CM

## 2025-07-07 DIAGNOSIS — M54.41 CHRONIC RIGHT-SIDED LOW BACK PAIN WITH RIGHT-SIDED SCIATICA: ICD-10-CM

## 2025-07-07 DIAGNOSIS — I10 BENIGN ESSENTIAL HYPERTENSION: ICD-10-CM

## 2025-07-07 DIAGNOSIS — R79.0 LOW MAGNESIUM LEVEL: ICD-10-CM

## 2025-07-07 DIAGNOSIS — E11.9 TYPE 2 DIABETES MELLITUS WITHOUT COMPLICATION, WITHOUT LONG-TERM CURRENT USE OF INSULIN (H): Primary | ICD-10-CM

## 2025-07-07 DIAGNOSIS — R20.0 NUMBNESS AND TINGLING OF BOTH FEET: ICD-10-CM

## 2025-07-07 DIAGNOSIS — G89.29 CHRONIC RIGHT-SIDED LOW BACK PAIN WITH RIGHT-SIDED SCIATICA: ICD-10-CM

## 2025-07-07 DIAGNOSIS — E11.9 TYPE 2 DIABETES MELLITUS WITHOUT COMPLICATION, WITHOUT LONG-TERM CURRENT USE OF INSULIN (H): ICD-10-CM

## 2025-07-07 LAB
ALBUMIN SERPL BCG-MCNC: 4.1 G/DL (ref 3.5–5.2)
ALP SERPL-CCNC: 85 U/L (ref 40–150)
ALT SERPL W P-5'-P-CCNC: 13 U/L (ref 0–50)
ANION GAP SERPL CALCULATED.3IONS-SCNC: 9 MMOL/L (ref 7–15)
AST SERPL W P-5'-P-CCNC: 30 U/L (ref 0–45)
BASOPHILS # BLD AUTO: 0.1 10E3/UL (ref 0–0.2)
BASOPHILS NFR BLD AUTO: 1 %
BILIRUB SERPL-MCNC: 0.4 MG/DL
BUN SERPL-MCNC: 17.8 MG/DL (ref 8–23)
CALCIUM SERPL-MCNC: 10 MG/DL (ref 8.8–10.4)
CHLORIDE SERPL-SCNC: 104 MMOL/L (ref 98–107)
CREAT SERPL-MCNC: 0.79 MG/DL (ref 0.51–0.95)
CREAT UR-MCNC: 80.9 MG/DL
EGFRCR SERPLBLD CKD-EPI 2021: 80 ML/MIN/1.73M2
EOSINOPHIL # BLD AUTO: 0.1 10E3/UL (ref 0–0.7)
EOSINOPHIL NFR BLD AUTO: 1 %
ERYTHROCYTE [DISTWIDTH] IN BLOOD BY AUTOMATED COUNT: 12.4 % (ref 10–15)
EST. AVERAGE GLUCOSE BLD GHB EST-MCNC: 114 MG/DL
GLUCOSE SERPL-MCNC: 94 MG/DL (ref 70–99)
HBA1C MFR BLD: 5.6 %
HCO3 SERPL-SCNC: 28 MMOL/L (ref 22–29)
HCT VFR BLD AUTO: 40.4 % (ref 35–47)
HGB BLD-MCNC: 13.7 G/DL (ref 11.7–15.7)
IMM GRANULOCYTES # BLD: 0 10E3/UL
IMM GRANULOCYTES NFR BLD: 0 %
LYMPHOCYTES # BLD AUTO: 2.5 10E3/UL (ref 0.8–5.3)
LYMPHOCYTES NFR BLD AUTO: 28 %
MCH RBC QN AUTO: 30.2 PG (ref 26.5–33)
MCHC RBC AUTO-ENTMCNC: 33.9 G/DL (ref 31.5–36.5)
MCV RBC AUTO: 89 FL (ref 78–100)
MICROALBUMIN UR-MCNC: <12 MG/L
MICROALBUMIN/CREAT UR: NORMAL MG/G{CREAT}
MONOCYTES # BLD AUTO: 0.6 10E3/UL (ref 0–1.3)
MONOCYTES NFR BLD AUTO: 6 %
NEUTROPHILS # BLD AUTO: 5.8 10E3/UL (ref 1.6–8.3)
NEUTROPHILS NFR BLD AUTO: 64 %
NRBC # BLD AUTO: 0 10E3/UL
NRBC BLD AUTO-RTO: 0 /100
PLATELET # BLD AUTO: 202 10E3/UL (ref 150–450)
POTASSIUM SERPL-SCNC: 4.4 MMOL/L (ref 3.4–5.3)
PROT SERPL-MCNC: 6.5 G/DL (ref 6.4–8.3)
RBC # BLD AUTO: 4.53 10E6/UL (ref 3.8–5.2)
SODIUM SERPL-SCNC: 141 MMOL/L (ref 135–145)
WBC # BLD AUTO: 9 10E3/UL (ref 4–11)

## 2025-07-07 PROCEDURE — G0463 HOSPITAL OUTPT CLINIC VISIT: HCPCS

## 2025-07-07 PROCEDURE — 36415 COLL VENOUS BLD VENIPUNCTURE: CPT | Mod: ZL

## 2025-07-07 PROCEDURE — 84155 ASSAY OF PROTEIN SERUM: CPT | Mod: ZL

## 2025-07-07 PROCEDURE — 83036 HEMOGLOBIN GLYCOSYLATED A1C: CPT | Mod: ZL

## 2025-07-07 PROCEDURE — 3044F HG A1C LEVEL LT 7.0%: CPT

## 2025-07-07 PROCEDURE — 82043 UR ALBUMIN QUANTITATIVE: CPT | Mod: ZL

## 2025-07-07 PROCEDURE — 85004 AUTOMATED DIFF WBC COUNT: CPT | Mod: ZL

## 2025-07-07 RX ORDER — MAGNESIUM OXIDE 400 MG/1
400 TABLET ORAL DAILY
COMMUNITY
End: 2025-07-07

## 2025-07-07 RX ORDER — GABAPENTIN 600 MG/1
600 TABLET ORAL 2 TIMES DAILY
Qty: 60 TABLET | Refills: 0 | Status: SHIPPED | OUTPATIENT
Start: 2025-07-07

## 2025-07-07 RX ORDER — GABAPENTIN 300 MG/1
300 CAPSULE ORAL DAILY
Qty: 30 CAPSULE | Refills: 0 | Status: CANCELLED | OUTPATIENT
Start: 2025-07-07

## 2025-07-07 RX ORDER — MAGNESIUM OXIDE 400 MG/1
400 TABLET ORAL DAILY
Qty: 30 TABLET | Refills: 5 | Status: SHIPPED | OUTPATIENT
Start: 2025-07-07 | End: 2025-07-09

## 2025-07-07 ASSESSMENT — PAIN SCALES - GENERAL: PAINLEVEL_OUTOF10: SEVERE PAIN (7)
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
## 2025-07-08 NOTE — TELEPHONE ENCOUNTER
Magnesium oxide 400 mg      Last Written Prescription Date:  07/07/2025  Last Fill Quantity: 30,   # refills: 5  Last Office Visit: 07/07/2025  Future Office visit:    Next 5 appointments (look out 90 days)      Sep 09, 2025 10:45 AM  (Arrive by 10:30 AM)  Return Visit with Lexi Felix DPM  [facility] ([facility] ) [address] [place] [id number] [phone]             Routing refill request to provider for review/approval because:  Medication is reported/historical

## 2025-07-09 RX ORDER — MAGNESIUM OXIDE 400 MG/1
400 TABLET ORAL DAILY
Qty: 30 TABLET | Refills: 5 | Status: SHIPPED | OUTPATIENT
Start: 2025-07-09

## 2025-07-09 NOTE — TELEPHONE ENCOUNTER
Routing refill request to provider for review/approval because:  Drug not on the [facility] or [facility] refill protocol or controlled substance

## 2025-07-16 PROBLEM — D12.6 COLON ADENOMA: Status: ACTIVE | Noted: 2025-07-16

## 2025-07-28 DIAGNOSIS — M54.41 CHRONIC RIGHT-SIDED LOW BACK PAIN WITH RIGHT-SIDED SCIATICA: ICD-10-CM

## 2025-07-28 DIAGNOSIS — E03.9 HYPOTHYROIDISM: Primary | ICD-10-CM

## 2025-07-28 DIAGNOSIS — G89.29 CHRONIC RIGHT-SIDED LOW BACK PAIN WITH RIGHT-SIDED SCIATICA: ICD-10-CM

## 2025-07-29 RX ORDER — GABAPENTIN 300 MG/1
CAPSULE ORAL
Qty: 30 CAPSULE | Refills: 2 | Status: SHIPPED | OUTPATIENT
Start: 2025-07-29

## 2025-07-29 RX ORDER — LEVOTHYROXINE SODIUM 125 UG/1
125 TABLET ORAL DAILY
Qty: 30 TABLET | Refills: 3 | Status: SHIPPED | OUTPATIENT
Start: 2025-07-29

## 2025-07-29 NOTE — TELEPHONE ENCOUNTER
gabapentin (NEURONTIN) 300 MG capsule       Last Written Prescription Date:  6-13-25  Last Fill Quantity: 30,   # refills: 0  Last Office Visit:7-7-25  Future Office visit:    Next 5 appointments (look out 90 days)      Aug 07, 2025 3:00 PM  (Arrive by 2:45 PM)  [facility] [address] [place] [id number]  [phone]     Sep 09, 2025 10:45 AM  (Arrive by 10:30 AM)  [facility] [address] [place] [phone]             Routing refill request to provider for review/approval because:  Drug not on the G, P or  Health refill protocol or controlled substance       levothyroxine 125mcg    Last Written Prescription Date:    Last Fill Quantity: ,   # refills:   Last Office Visit: 7-7-25  Future Office visit:    Next 5 appointments (look out 90 days)      Aug 07, 2025 3:00 PM  (Arrive by 2:45 PM)  Provider [facility] GUZMAN Rosa C[facility]  [facility] [address] [place] [id number]  [phone]     Sep 09, 2025 10:45 AM  (Arrive by 10:30 AM)  [facility] [address] [place] [phone]             Routing refill request to provider for review/approval because:  Drug not active on patient's medication list

## 2025-08-04 DIAGNOSIS — G89.29 BILATERAL CHRONIC KNEE PAIN: Primary | ICD-10-CM

## 2025-08-04 DIAGNOSIS — M25.561 BILATERAL CHRONIC KNEE PAIN: Primary | ICD-10-CM

## 2025-08-04 DIAGNOSIS — M25.562 BILATERAL CHRONIC KNEE PAIN: Primary | ICD-10-CM

## 2025-08-04 DIAGNOSIS — M48.061 SPINAL STENOSIS OF LUMBAR REGION WITHOUT NEUROGENIC CLAUDICATION: ICD-10-CM

## 2025-08-07 ENCOUNTER — OFFICE VISIT (OUTPATIENT)
Dept: FAMILY MEDICINE | Facility: OTHER | Age: 70
End: 2025-08-07
Attending: NURSE PRACTITIONER
Payer: COMMERCIAL

## 2025-08-07 VITALS
TEMPERATURE: 98.1 F | WEIGHT: 197.8 LBS | OXYGEN SATURATION: 98 % | SYSTOLIC BLOOD PRESSURE: 126 MMHG | RESPIRATION RATE: 16 BRPM | HEART RATE: 83 BPM | BODY MASS INDEX: 27.69 KG/M2 | HEIGHT: 71 IN | DIASTOLIC BLOOD PRESSURE: 70 MMHG

## 2025-08-07 DIAGNOSIS — R79.0 LOW MAGNESIUM LEVEL: Primary | ICD-10-CM

## 2025-08-07 DIAGNOSIS — Z91.09 ENVIRONMENTAL ALLERGIES: ICD-10-CM

## 2025-08-07 DIAGNOSIS — R63.4 WEIGHT LOSS: ICD-10-CM

## 2025-08-07 DIAGNOSIS — S80.819A: ICD-10-CM

## 2025-08-07 LAB — MAGNESIUM SERPL-MCNC: 1.6 MG/DL (ref 1.7–2.3)

## 2025-08-07 PROCEDURE — 1125F AMNT PAIN NOTED PAIN PRSNT: CPT | Performed by: NURSE PRACTITIONER

## 2025-08-07 PROCEDURE — 36415 COLL VENOUS BLD VENIPUNCTURE: CPT | Mod: ZL | Performed by: NURSE PRACTITIONER

## 2025-08-07 PROCEDURE — G0463 HOSPITAL OUTPT CLINIC VISIT: HCPCS

## 2025-08-07 PROCEDURE — 99213 OFFICE O/P EST LOW 20 MIN: CPT | Performed by: NURSE PRACTITIONER

## 2025-08-07 PROCEDURE — 3074F SYST BP LT 130 MM HG: CPT | Performed by: NURSE PRACTITIONER

## 2025-08-07 PROCEDURE — 83735 ASSAY OF MAGNESIUM: CPT | Mod: ZL | Performed by: NURSE PRACTITIONER

## 2025-08-07 PROCEDURE — G2211 COMPLEX E/M VISIT ADD ON: HCPCS | Performed by: NURSE PRACTITIONER

## 2025-08-07 PROCEDURE — 3078F DIAST BP <80 MM HG: CPT | Performed by: NURSE PRACTITIONER

## 2025-08-07 RX ORDER — EPINEPHRINE 0.3 MG/.3ML
0.3 INJECTION SUBCUTANEOUS PRN
Qty: 2 EACH | Refills: 1 | Status: SHIPPED | OUTPATIENT
Start: 2025-08-07

## 2025-08-07 ASSESSMENT — PAIN SCALES - GENERAL: PAINLEVEL_OUTOF10: SEVERE PAIN (8)

## 2025-08-12 DIAGNOSIS — E11.9 TYPE 2 DIABETES MELLITUS WITHOUT COMPLICATION, WITHOUT LONG-TERM CURRENT USE OF INSULIN (H): Primary | ICD-10-CM

## 2025-08-13 RX ORDER — METFORMIN HYDROCHLORIDE 500 MG/1
500 TABLET, EXTENDED RELEASE ORAL
Qty: 180 TABLET | Refills: 0 | Status: SHIPPED | OUTPATIENT
Start: 2025-08-13

## 2025-08-19 ENCOUNTER — MEDICAL CORRESPONDENCE (OUTPATIENT)
Dept: MRI IMAGING | Facility: HOSPITAL | Age: 70
End: 2025-08-19

## 2025-08-21 DIAGNOSIS — E03.9 HYPOTHYROIDISM: ICD-10-CM

## 2025-08-21 RX ORDER — LEVOTHYROXINE SODIUM 125 UG/1
125 TABLET ORAL DAILY
Qty: 30 TABLET | Refills: 3 | Status: SHIPPED | OUTPATIENT
Start: 2025-08-21

## 2025-08-29 ENCOUNTER — APPOINTMENT (OUTPATIENT)
Dept: CT IMAGING | Facility: HOSPITAL | Age: 70
End: 2025-08-29
Attending: NURSE PRACTITIONER
Payer: COMMERCIAL

## 2025-08-29 ENCOUNTER — APPOINTMENT (OUTPATIENT)
Dept: GENERAL RADIOLOGY | Facility: HOSPITAL | Age: 70
End: 2025-08-29
Attending: NURSE PRACTITIONER
Payer: COMMERCIAL

## 2025-08-29 ENCOUNTER — HOSPITAL ENCOUNTER (EMERGENCY)
Facility: HOSPITAL | Age: 70
Discharge: HOME | End: 2025-08-29
Attending: NURSE PRACTITIONER
Payer: COMMERCIAL

## 2025-08-29 VITALS
HEART RATE: 83 BPM | DIASTOLIC BLOOD PRESSURE: 78 MMHG | RESPIRATION RATE: 20 BRPM | HEIGHT: 71 IN | WEIGHT: 194.7 LBS | TEMPERATURE: 96.3 F | OXYGEN SATURATION: 96 % | SYSTOLIC BLOOD PRESSURE: 137 MMHG | BODY MASS INDEX: 27.26 KG/M2

## 2025-08-29 DIAGNOSIS — S80.02XA CONTUSION OF LEFT KNEE, INITIAL ENCOUNTER: ICD-10-CM

## 2025-08-29 DIAGNOSIS — S60.222A CONTUSION OF LEFT HAND, INITIAL ENCOUNTER: ICD-10-CM

## 2025-08-29 DIAGNOSIS — S00.81XA ABRASION OF FACE, INITIAL ENCOUNTER: ICD-10-CM

## 2025-08-29 DIAGNOSIS — S00.93XA HEAD CONTUSION: Primary | ICD-10-CM

## 2025-08-29 DIAGNOSIS — S02.5XXA CLOSED FRACTURE OF TOOTH, INITIAL ENCOUNTER: ICD-10-CM

## 2025-08-29 PROCEDURE — 99284 EMERGENCY DEPT VISIT MOD MDM: CPT | Mod: 25 | Performed by: NURSE PRACTITIONER

## 2025-08-29 PROCEDURE — 72125 CT NECK SPINE W/O DYE: CPT

## 2025-08-29 PROCEDURE — 73130 X-RAY EXAM OF HAND: CPT | Mod: 26 | Performed by: RADIOLOGY

## 2025-08-29 PROCEDURE — 73562 X-RAY EXAM OF KNEE 3: CPT | Mod: LT

## 2025-08-29 PROCEDURE — 70450 CT HEAD/BRAIN W/O DYE: CPT | Mod: 26 | Performed by: RADIOLOGY

## 2025-08-29 PROCEDURE — 73130 X-RAY EXAM OF HAND: CPT | Mod: LT

## 2025-08-29 PROCEDURE — 90715 TDAP VACCINE 7 YRS/> IM: CPT | Performed by: NURSE PRACTITIONER

## 2025-08-29 PROCEDURE — 72125 CT NECK SPINE W/O DYE: CPT | Mod: 26 | Performed by: RADIOLOGY

## 2025-08-29 PROCEDURE — 73562 X-RAY EXAM OF KNEE 3: CPT | Mod: 26 | Performed by: RADIOLOGY

## 2025-08-29 PROCEDURE — 70450 CT HEAD/BRAIN W/O DYE: CPT

## 2025-08-29 PROCEDURE — 250N000011 HC RX IP 250 OP 636: Performed by: NURSE PRACTITIONER

## 2025-08-29 PROCEDURE — 90471 IMMUNIZATION ADMIN: CPT | Performed by: NURSE PRACTITIONER

## 2025-08-29 PROCEDURE — 250N000013 HC RX MED GY IP 250 OP 250 PS 637: Performed by: NURSE PRACTITIONER

## 2025-08-29 RX ORDER — ACETAMINOPHEN 325 MG/1
975 TABLET ORAL ONCE
Status: COMPLETED | OUTPATIENT
Start: 2025-08-29 | End: 2025-08-29

## 2025-08-29 RX ADMIN — CLOSTRIDIUM TETANI TOXOID ANTIGEN (FORMALDEHYDE INACTIVATED), CORYNEBACTERIUM DIPHTHERIAE TOXOID ANTIGEN (FORMALDEHYDE INACTIVATED), BORDETELLA PERTUSSIS TOXOID ANTIGEN (GLUTARALDEHYDE INACTIVATED), BORDETELLA PERTUSSIS FILAMENTOUS HEMAGGLUTININ ANTIGEN (FORMALDEHYDE INACTIVATED), BORDETELLA PERTUSSIS PERTACTIN ANTIGEN, AND BORDETELLA PERTUSSIS FIMBRIAE 2/3 ANTIGEN 0.5 ML: 5; 2; 2.5; 5; 3; 5 INJECTION, SUSPENSION INTRAMUSCULAR at 14:34

## 2025-08-29 RX ADMIN — ACETAMINOPHEN 975 MG: 325 TABLET ORAL at 14:33

## 2025-08-29 ASSESSMENT — COLUMBIA-SUICIDE SEVERITY RATING SCALE - C-SSRS
2. HAVE YOU ACTUALLY HAD ANY THOUGHTS OF KILLING YOURSELF IN THE PAST MONTH?: NO
6. HAVE YOU EVER DONE ANYTHING, STARTED TO DO ANYTHING, OR PREPARED TO DO ANYTHING TO END YOUR LIFE?: NO
1. IN THE PAST MONTH, HAVE YOU WISHED YOU WERE DEAD OR WISHED YOU COULD GO TO SLEEP AND NOT WAKE UP?: NO

## 2025-08-29 ASSESSMENT — ENCOUNTER SYMPTOMS
LIGHT-HEADEDNESS: 0
CARDIOVASCULAR NEGATIVE: 1
RESPIRATORY NEGATIVE: 1
WEAKNESS: 0
DIZZINESS: 0
EYES NEGATIVE: 1
HEMATOLOGIC/LYMPHATIC NEGATIVE: 1
ALLERGIC/IMMUNOLOGIC NEGATIVE: 1
WOUND: 1
SEIZURES: 0
TROUBLE SWALLOWING: 0
GASTROINTESTINAL NEGATIVE: 1
HEADACHES: 1
RHINORRHEA: 0
CONSTITUTIONAL NEGATIVE: 1
PSYCHIATRIC NEGATIVE: 1
ENDOCRINE NEGATIVE: 1

## 2025-08-29 ASSESSMENT — ACTIVITIES OF DAILY LIVING (ADL)
ADLS_ACUITY_SCORE: 45
ADLS_ACUITY_SCORE: 45

## 2025-09-02 ENCOUNTER — TELEPHONE (OUTPATIENT)
Dept: FAMILY MEDICINE | Facility: OTHER | Age: 70
End: 2025-09-02

## 2025-09-02 ENCOUNTER — TELEPHONE (OUTPATIENT)
Dept: CARE COORDINATION | Facility: OTHER | Age: 70
End: 2025-09-02

## 2025-09-03 ENCOUNTER — HOSPITAL ENCOUNTER (OUTPATIENT)
Dept: MRI IMAGING | Facility: HOSPITAL | Age: 70
Discharge: HOME | End: 2025-09-03
Attending: FAMILY MEDICINE
Payer: COMMERCIAL

## 2025-09-03 DIAGNOSIS — S32.599A: ICD-10-CM

## 2025-09-03 PROCEDURE — 72195 MRI PELVIS W/O DYE: CPT

## 2025-09-03 PROCEDURE — 72195 MRI PELVIS W/O DYE: CPT | Mod: 26 | Performed by: RADIOLOGY

## (undated) DEVICE — TUBING-SUCTION 20FT

## (undated) DEVICE — FORCEPS BIOPSY RADIAL JAW 4 LARGE W/NEEDLE 240CM M00513332

## (undated) DEVICE — CAUTERY PAD-POLYHESIVE II ADULT

## (undated) DEVICE — SENSOR-OXISENSOR II ADULT

## (undated) DEVICE — FORCEP-COLON BIOPSY STD W/NEEDLE 160CM

## (undated) DEVICE — CONNECTOR-ERBEFLO 2 PORT

## (undated) DEVICE — TUBING SUCTION 20FT N620A

## (undated) DEVICE — SOL WATER IRRIG 1000ML BOTTLE 2F7114

## (undated) DEVICE — MOUTHPIECE W/GUARD FOR ENDOSCOPY

## (undated) DEVICE — LUBRICANT JELLY 2OZ. TUBE

## (undated) DEVICE — IRRIGATION-H2O 1000ML

## (undated) DEVICE — CANISTER-SUCTION 2000CC

## (undated) DEVICE — SYRINGE-30CC SLIP TIP

## (undated) DEVICE — TRAP-POLYP E-TRAP

## (undated) DEVICE — CONNECTOR ERBEFLO 2 PORT 20325-215

## (undated) DEVICE — FORCEP-COLON BIOPSY LARGE W/NEEDLE 240CM

## (undated) DEVICE — CANISTER SUCTION MEDI-VAC GUARDIAN 2000ML 90D 65651-220

## (undated) RX ORDER — LIDOCAINE HYDROCHLORIDE 20 MG/ML
INJECTION, SOLUTION EPIDURAL; INFILTRATION; INTRACAUDAL; PERINEURAL
Status: DISPENSED
Start: 2019-03-07

## (undated) RX ORDER — PROPOFOL 10 MG/ML
INJECTION, EMULSION INTRAVENOUS
Status: DISPENSED
Start: 2023-10-26

## (undated) RX ORDER — PROPOFOL 10 MG/ML
INJECTION, EMULSION INTRAVENOUS
Status: DISPENSED
Start: 2019-03-07